# Patient Record
Sex: FEMALE | Race: WHITE | NOT HISPANIC OR LATINO | Employment: FULL TIME | URBAN - METROPOLITAN AREA
[De-identification: names, ages, dates, MRNs, and addresses within clinical notes are randomized per-mention and may not be internally consistent; named-entity substitution may affect disease eponyms.]

---

## 2017-07-17 DIAGNOSIS — K50.80 CROHN'S DISEASE OF BOTH SMALL AND LARGE INTESTINE WITHOUT COMPLICATION (HCC): ICD-10-CM

## 2017-07-17 DIAGNOSIS — R10.30 LOWER ABDOMINAL PAIN: ICD-10-CM

## 2017-07-17 RX ORDER — MESALAMINE 1.2 G/1
TABLET, DELAYED RELEASE ORAL
Qty: 60 TABLET | Refills: 11 | OUTPATIENT
Start: 2017-07-17

## 2017-09-14 ENCOUNTER — APPOINTMENT (OUTPATIENT)
Dept: LAB | Facility: HOSPITAL | Age: 37
End: 2017-09-14

## 2017-09-14 ENCOUNTER — OFFICE VISIT (OUTPATIENT)
Dept: GASTROENTEROLOGY | Facility: CLINIC | Age: 37
End: 2017-09-14

## 2017-09-14 VITALS
DIASTOLIC BLOOD PRESSURE: 80 MMHG | SYSTOLIC BLOOD PRESSURE: 124 MMHG | WEIGHT: 177 LBS | HEIGHT: 68 IN | RESPIRATION RATE: 12 BRPM | OXYGEN SATURATION: 98 % | BODY MASS INDEX: 26.83 KG/M2 | TEMPERATURE: 97.7 F | HEART RATE: 65 BPM

## 2017-09-14 DIAGNOSIS — D84.9 IMMUNOCOMPROMISED (HCC): ICD-10-CM

## 2017-09-14 DIAGNOSIS — R10.30 LOWER ABDOMINAL PAIN: ICD-10-CM

## 2017-09-14 DIAGNOSIS — K50.80 CROHN'S DISEASE OF BOTH SMALL AND LARGE INTESTINE WITHOUT COMPLICATION (HCC): Primary | ICD-10-CM

## 2017-09-14 DIAGNOSIS — Z79.899 MEDICATION MANAGEMENT: ICD-10-CM

## 2017-09-14 LAB
ALBUMIN SERPL-MCNC: 3.7 G/DL (ref 3.2–4.8)
ALBUMIN/GLOB SERPL: 1 G/DL (ref 1.5–2.5)
ALP SERPL-CCNC: 87 U/L (ref 25–100)
ALT SERPL W P-5'-P-CCNC: 11 U/L (ref 7–40)
ANION GAP SERPL CALCULATED.3IONS-SCNC: 10 MMOL/L (ref 3–11)
AST SERPL-CCNC: 14 U/L (ref 0–33)
BASOPHILS # BLD AUTO: 0.04 10*3/MM3 (ref 0–0.2)
BASOPHILS NFR BLD AUTO: 0.7 % (ref 0–1)
BILIRUB SERPL-MCNC: 0.2 MG/DL (ref 0.3–1.2)
BUN BLD-MCNC: 10 MG/DL (ref 9–23)
BUN/CREAT SERPL: 12.5 (ref 7–25)
CALCIUM SPEC-SCNC: 8.5 MG/DL (ref 8.7–10.4)
CHLORIDE SERPL-SCNC: 107 MMOL/L (ref 99–109)
CO2 SERPL-SCNC: 22 MMOL/L (ref 20–31)
CREAT BLD-MCNC: 0.8 MG/DL (ref 0.6–1.3)
CRP SERPL-MCNC: 8.11 MG/DL (ref 0–1)
DEPRECATED RDW RBC AUTO: 44.8 FL (ref 37–54)
EOSINOPHIL # BLD AUTO: 0.4 10*3/MM3 (ref 0–0.3)
EOSINOPHIL NFR BLD AUTO: 7 % (ref 0–3)
ERYTHROCYTE [DISTWIDTH] IN BLOOD BY AUTOMATED COUNT: 17.9 % (ref 11.3–14.5)
GFR SERPL CREATININE-BSD FRML MDRD: 80 ML/MIN/1.73
GLOBULIN UR ELPH-MCNC: 3.6 GM/DL
GLUCOSE BLD-MCNC: 93 MG/DL (ref 70–100)
HCT VFR BLD AUTO: 32.9 % (ref 34.5–44)
HGB BLD-MCNC: 9.2 G/DL (ref 11.5–15.5)
IMM GRANULOCYTES # BLD: 0.01 10*3/MM3 (ref 0–0.03)
IMM GRANULOCYTES NFR BLD: 0.2 % (ref 0–0.6)
LYMPHOCYTES # BLD AUTO: 1.13 10*3/MM3 (ref 0.6–4.8)
LYMPHOCYTES NFR BLD AUTO: 19.8 % (ref 24–44)
MCH RBC QN AUTO: 19.2 PG (ref 27–31)
MCHC RBC AUTO-ENTMCNC: 28 G/DL (ref 32–36)
MCV RBC AUTO: 68.8 FL (ref 80–99)
MONOCYTES # BLD AUTO: 0.91 10*3/MM3 (ref 0–1)
MONOCYTES NFR BLD AUTO: 15.9 % (ref 0–12)
NEUTROPHILS # BLD AUTO: 3.22 10*3/MM3 (ref 1.5–8.3)
NEUTROPHILS NFR BLD AUTO: 56.4 % (ref 41–71)
PLATELET # BLD AUTO: 691 10*3/MM3 (ref 150–450)
PMV BLD AUTO: 8.2 FL (ref 6–12)
POTASSIUM BLD-SCNC: 5.1 MMOL/L (ref 3.5–5.5)
PROT SERPL-MCNC: 7.3 G/DL (ref 5.7–8.2)
RBC # BLD AUTO: 4.78 10*6/MM3 (ref 3.89–5.14)
SODIUM BLD-SCNC: 139 MMOL/L (ref 132–146)
WBC NRBC COR # BLD: 5.71 10*3/MM3 (ref 3.5–10.8)

## 2017-09-14 PROCEDURE — 80053 COMPREHEN METABOLIC PANEL: CPT | Performed by: INTERNAL MEDICINE

## 2017-09-14 PROCEDURE — 86140 C-REACTIVE PROTEIN: CPT | Performed by: INTERNAL MEDICINE

## 2017-09-14 PROCEDURE — 85025 COMPLETE CBC W/AUTO DIFF WBC: CPT | Performed by: INTERNAL MEDICINE

## 2017-09-14 PROCEDURE — 99214 OFFICE O/P EST MOD 30 MIN: CPT | Performed by: INTERNAL MEDICINE

## 2017-09-14 PROCEDURE — 36415 COLL VENOUS BLD VENIPUNCTURE: CPT | Performed by: INTERNAL MEDICINE

## 2017-09-14 RX ORDER — AZATHIOPRINE 50 MG/1
100 TABLET ORAL DAILY
Qty: 60 TABLET | Refills: 11 | Status: SHIPPED | OUTPATIENT
Start: 2017-09-14 | End: 2018-02-21 | Stop reason: SDUPTHER

## 2017-09-14 RX ORDER — MESALAMINE 1.2 G/1
2400 TABLET, DELAYED RELEASE ORAL DAILY
Qty: 60 TABLET | Refills: 11 | Status: SHIPPED | OUTPATIENT
Start: 2017-09-14 | End: 2017-12-20 | Stop reason: SDUPTHER

## 2017-09-14 NOTE — PROGRESS NOTES
Patient here for evaluation of Crohn's Disease. Diagnosis was made by colonoscopy, biopsies in 2001, small bowel series and CT scan. Onset was 15 years ago. Disease has involved cecum, ascending colon, transverse colon and terminal ileum. The patient has had anal setons for treatment of their IBD. The patient is currently having 1 bowel movements per day which are semisolid.  The patient currently denies significant abdominal pain or discomfort.  The patient does not have fever.  The patient does not have weight loss.  The patient does not have extraintestinal symptoms .  Last colonoscopy was Jan/2015 when having a flare.  She had been on just Lialda and Imuran.  She was finally after a lenthy delay able to get approval from Medicaid for Humira and has felt fine since. But has noted continued symptoms of postprandial cramping and bloating.She has had no nausea or vomiting.

## 2017-10-04 ENCOUNTER — TELEPHONE (OUTPATIENT)
Dept: GASTROENTEROLOGY | Facility: CLINIC | Age: 37
End: 2017-10-04

## 2017-12-19 ENCOUNTER — TELEPHONE (OUTPATIENT)
Dept: GASTROENTEROLOGY | Facility: CLINIC | Age: 37
End: 2017-12-19

## 2017-12-19 NOTE — TELEPHONE ENCOUNTER
Patient called needs refills to Humeria with [facility] and Claudine with [facility]. Mary Grace Chow rx was printed and she never turned into the pharmacy.

## 2017-12-20 DIAGNOSIS — K50.819 CROHN'S DISEASE OF SMALL AND LARGE INTESTINES WITH COMPLICATION (HCC): Primary | ICD-10-CM

## 2017-12-20 DIAGNOSIS — Z79.899 MEDICATION MANAGEMENT: ICD-10-CM

## 2017-12-20 DIAGNOSIS — K50.80 CROHN'S DISEASE OF BOTH SMALL AND LARGE INTESTINE WITHOUT COMPLICATION (HCC): Primary | ICD-10-CM

## 2017-12-20 DIAGNOSIS — K50.80 CROHN'S DISEASE OF BOTH SMALL AND LARGE INTESTINE WITHOUT COMPLICATION (HCC): ICD-10-CM

## 2017-12-20 RX ORDER — MESALAMINE 1.2 G/1
2400 TABLET, DELAYED RELEASE ORAL DAILY
Qty: 60 TABLET | Refills: 11 | Status: SHIPPED | OUTPATIENT
Start: 2017-12-20 | End: 2017-12-20

## 2017-12-20 RX ORDER — MESALAMINE 1.2 G/1
1200 TABLET, DELAYED RELEASE ORAL
Qty: 60 TABLET | Refills: 11 | Status: ON HOLD | OUTPATIENT
Start: 2017-12-20 | End: 2018-01-19

## 2017-12-20 NOTE — TELEPHONE ENCOUNTER
I renewed her Humira which listed only [facility] as her pharmacy in [place]. I am confused as to status of Claudine and did not reorder it.

## 2018-01-08 ENCOUNTER — TRANSCRIBE ORDERS (OUTPATIENT)
Dept: ADMINISTRATIVE | Facility: HOSPITAL | Age: 38
End: 2018-01-08

## 2018-01-08 DIAGNOSIS — R10.12 ABDOMINAL PAIN, LEFT UPPER QUADRANT: Primary | ICD-10-CM

## 2018-01-08 DIAGNOSIS — R10.12 LUQ PAIN: ICD-10-CM

## 2018-01-08 DIAGNOSIS — K50.819 CROHN'S DISEASE OF BOTH SMALL AND LARGE INTESTINE WITH COMPLICATION (HCC): ICD-10-CM

## 2018-01-08 DIAGNOSIS — K50.819 CROHN'S DISEASE OF BOTH SMALL AND LG INT W UNSP COMP (HCC): Primary | ICD-10-CM

## 2018-01-09 ENCOUNTER — HOSPITAL ENCOUNTER (OUTPATIENT)
Dept: CT IMAGING | Facility: HOSPITAL | Age: 38
Discharge: HOME | End: 2018-01-09
Attending: FAMILY MEDICINE | Admitting: FAMILY MEDICINE

## 2018-01-09 DIAGNOSIS — K50.819 CROHN'S DISEASE OF BOTH SMALL AND LG INT W UNSP COMP (HCC): ICD-10-CM

## 2018-01-09 DIAGNOSIS — R10.12 LUQ PAIN: ICD-10-CM

## 2018-01-09 PROCEDURE — 74177 CT ABD & PELVIS W/CONTRAST: CPT

## 2018-01-09 PROCEDURE — 0 IOPAMIDOL 61 % SOLUTION: Performed by: FAMILY MEDICINE

## 2018-01-09 RX ADMIN — BARIUM SULFATE 450 ML: 21 SUSPENSION ORAL at 13:30

## 2018-01-09 RX ADMIN — IOPAMIDOL 99 ML: 612 INJECTION, SOLUTION INTRAVENOUS at 14:40

## 2018-01-17 ENCOUNTER — HOSPITAL ENCOUNTER (INPATIENT)
Facility: HOSPITAL | Age: 38
LOS: 3 days | Discharge: HOME | End: 2018-01-20
Attending: EMERGENCY MEDICINE | Admitting: INTERNAL MEDICINE

## 2018-01-17 ENCOUNTER — OFFICE VISIT (OUTPATIENT)
Dept: GASTROENTEROLOGY | Facility: CLINIC | Age: 38
End: 2018-01-17

## 2018-01-17 VITALS
RESPIRATION RATE: 16 BRPM | BODY MASS INDEX: 25.61 KG/M2 | HEIGHT: 68 IN | SYSTOLIC BLOOD PRESSURE: 122 MMHG | OXYGEN SATURATION: 99 % | TEMPERATURE: 98.8 F | HEART RATE: 120 BPM | DIASTOLIC BLOOD PRESSURE: 80 MMHG | WEIGHT: 169 LBS

## 2018-01-17 DIAGNOSIS — K50.118 CROHN'S DISEASE OF LARGE INTESTINE WITH OTHER COMPLICATION (HCC): Primary | ICD-10-CM

## 2018-01-17 DIAGNOSIS — K50.80 CROHN'S DISEASE OF BOTH SMALL AND LARGE INTESTINE WITHOUT COMPLICATION (HCC): Primary | ICD-10-CM

## 2018-01-17 DIAGNOSIS — D84.9 IMMUNOCOMPROMISED (HCC): ICD-10-CM

## 2018-01-17 DIAGNOSIS — R10.84 GENERALIZED ABDOMINAL PAIN: ICD-10-CM

## 2018-01-17 DIAGNOSIS — Z79.899 MEDICATION MANAGEMENT: ICD-10-CM

## 2018-01-17 PROBLEM — D64.9 ANEMIA: Status: ACTIVE | Noted: 2018-01-17

## 2018-01-17 PROBLEM — N30.01 ACUTE CYSTITIS WITH HEMATURIA: Status: ACTIVE | Noted: 2018-01-17

## 2018-01-17 LAB
ALBUMIN SERPL-MCNC: 3.7 G/DL (ref 3.2–4.8)
ALBUMIN/GLOB SERPL: 0.9 G/DL (ref 1.5–2.5)
ALP SERPL-CCNC: 85 U/L (ref 25–100)
ALT SERPL W P-5'-P-CCNC: 6 U/L (ref 7–40)
ANION GAP SERPL CALCULATED.3IONS-SCNC: 12 MMOL/L (ref 3–11)
AST SERPL-CCNC: 9 U/L (ref 0–33)
B-HCG UR QL: NEGATIVE
BACTERIA UR QL AUTO: ABNORMAL /HPF
BASOPHILS # BLD AUTO: 0.03 10*3/MM3 (ref 0–0.2)
BASOPHILS NFR BLD AUTO: 0.3 % (ref 0–1)
BILIRUB SERPL-MCNC: 0.3 MG/DL (ref 0.3–1.2)
BILIRUB UR QL STRIP: ABNORMAL
BUN BLD-MCNC: 11 MG/DL (ref 9–23)
BUN/CREAT SERPL: 12.2 (ref 7–25)
CALCIUM SPEC-SCNC: 9.1 MG/DL (ref 8.7–10.4)
CHLORIDE SERPL-SCNC: 101 MMOL/L (ref 99–109)
CLARITY UR: ABNORMAL
CO2 SERPL-SCNC: 20 MMOL/L (ref 20–31)
COLOR UR: ABNORMAL
CREAT BLD-MCNC: 0.9 MG/DL (ref 0.6–1.3)
DEPRECATED RDW RBC AUTO: 42.3 FL (ref 37–54)
EOSINOPHIL # BLD AUTO: 0.19 10*3/MM3 (ref 0–0.3)
EOSINOPHIL NFR BLD AUTO: 2 % (ref 0–3)
ERYTHROCYTE [DISTWIDTH] IN BLOOD BY AUTOMATED COUNT: 17.4 % (ref 11.3–14.5)
FINE GRAN CASTS URNS QL MICRO: ABNORMAL /LPF
GFR SERPL CREATININE-BSD FRML MDRD: 70 ML/MIN/1.73
GLOBULIN UR ELPH-MCNC: 4.2 GM/DL
GLUCOSE BLD-MCNC: 92 MG/DL (ref 70–100)
GLUCOSE UR STRIP-MCNC: NEGATIVE MG/DL
HCT VFR BLD AUTO: 26.6 % (ref 34.5–44)
HGB BLD-MCNC: 7.5 G/DL (ref 11.5–15.5)
HGB UR QL STRIP.AUTO: ABNORMAL
HOLD SPECIMEN: NORMAL
HOLD SPECIMEN: NORMAL
HYALINE CASTS UR QL AUTO: ABNORMAL /LPF
IMM GRANULOCYTES # BLD: 0.03 10*3/MM3 (ref 0–0.03)
IMM GRANULOCYTES NFR BLD: 0.3 % (ref 0–0.6)
INTERNAL NEGATIVE CONTROL: NEGATIVE
INTERNAL POSITIVE CONTROL: POSITIVE
IRON 24H UR-MRATE: 4 MCG/DL (ref 50–175)
IRON SATN MFR SERPL: 1 % (ref 15–50)
KETONES UR QL STRIP: ABNORMAL
LEUKOCYTE ESTERASE UR QL STRIP.AUTO: ABNORMAL
LIPASE SERPL-CCNC: 26 U/L (ref 6–51)
LYMPHOCYTES # BLD AUTO: 1.27 10*3/MM3 (ref 0.6–4.8)
LYMPHOCYTES NFR BLD AUTO: 13.2 % (ref 24–44)
Lab: NORMAL
MCH RBC QN AUTO: 18.6 PG (ref 27–31)
MCHC RBC AUTO-ENTMCNC: 28.2 G/DL (ref 32–36)
MCV RBC AUTO: 66 FL (ref 80–99)
MONOCYTES # BLD AUTO: 1 10*3/MM3 (ref 0–1)
MONOCYTES NFR BLD AUTO: 10.4 % (ref 0–12)
MUCOUS THREADS URNS QL MICRO: ABNORMAL /HPF
NEUTROPHILS # BLD AUTO: 7.11 10*3/MM3 (ref 1.5–8.3)
NEUTROPHILS NFR BLD AUTO: 73.8 % (ref 41–71)
NITRITE UR QL STRIP: POSITIVE
PH UR STRIP.AUTO: 5.5 [PH] (ref 5–8)
PLATELET # BLD AUTO: 808 10*3/MM3 (ref 150–450)
PMV BLD AUTO: 8 FL (ref 6–12)
POTASSIUM BLD-SCNC: 3.7 MMOL/L (ref 3.5–5.5)
PROT SERPL-MCNC: 7.9 G/DL (ref 5.7–8.2)
PROT UR QL STRIP: ABNORMAL
RBC # BLD AUTO: 4.03 10*6/MM3 (ref 3.89–5.14)
RBC # UR: ABNORMAL /HPF
REF LAB TEST METHOD: ABNORMAL
RETICS/RBC NFR AUTO: 0.91 % (ref 0.5–1.5)
SODIUM BLD-SCNC: 133 MMOL/L (ref 132–146)
SP GR UR STRIP: 1.03 (ref 1–1.03)
SQUAMOUS #/AREA URNS HPF: ABNORMAL /HPF
TIBC SERPL-MCNC: 343 MCG/DL (ref 250–450)
UROBILINOGEN UR QL STRIP: ABNORMAL
WBC NRBC COR # BLD: 9.63 10*3/MM3 (ref 3.5–10.8)
WBC UR QL AUTO: ABNORMAL /HPF
WHOLE BLOOD HOLD SPECIMEN: NORMAL
WHOLE BLOOD HOLD SPECIMEN: NORMAL

## 2018-01-17 PROCEDURE — 83540 ASSAY OF IRON: CPT | Performed by: HOSPITALIST

## 2018-01-17 PROCEDURE — 25810000003 SODIUM CHLORIDE 0.9 % WITH KCL 20 MEQ 20-0.9 MEQ/L-% SOLUTION: Performed by: NURSE PRACTITIONER

## 2018-01-17 PROCEDURE — 99214 OFFICE O/P EST MOD 30 MIN: CPT | Performed by: INTERNAL MEDICINE

## 2018-01-17 PROCEDURE — 82728 ASSAY OF FERRITIN: CPT | Performed by: HOSPITALIST

## 2018-01-17 PROCEDURE — 81001 URINALYSIS AUTO W/SCOPE: CPT | Performed by: EMERGENCY MEDICINE

## 2018-01-17 PROCEDURE — 83690 ASSAY OF LIPASE: CPT | Performed by: EMERGENCY MEDICINE

## 2018-01-17 PROCEDURE — 86140 C-REACTIVE PROTEIN: CPT | Performed by: HOSPITALIST

## 2018-01-17 PROCEDURE — 85025 COMPLETE CBC W/AUTO DIFF WBC: CPT | Performed by: EMERGENCY MEDICINE

## 2018-01-17 PROCEDURE — 85045 AUTOMATED RETICULOCYTE COUNT: CPT | Performed by: HOSPITALIST

## 2018-01-17 PROCEDURE — 25010000002 METHYLPREDNISOLONE PER 125 MG: Performed by: NURSE PRACTITIONER

## 2018-01-17 PROCEDURE — 87086 URINE CULTURE/COLONY COUNT: CPT | Performed by: EMERGENCY MEDICINE

## 2018-01-17 PROCEDURE — 99284 EMERGENCY DEPT VISIT MOD MDM: CPT

## 2018-01-17 PROCEDURE — 83550 IRON BINDING TEST: CPT | Performed by: HOSPITALIST

## 2018-01-17 PROCEDURE — 80053 COMPREHEN METABOLIC PANEL: CPT | Performed by: EMERGENCY MEDICINE

## 2018-01-17 PROCEDURE — 99223 1ST HOSP IP/OBS HIGH 75: CPT | Performed by: HOSPITALIST

## 2018-01-17 RX ORDER — ACETAMINOPHEN 325 MG/1
650 TABLET ORAL EVERY 4 HOURS PRN
Status: DISCONTINUED | OUTPATIENT
Start: 2018-01-17 | End: 2018-01-20 | Stop reason: HOSPADM

## 2018-01-17 RX ORDER — METHYLPREDNISOLONE SODIUM SUCCINATE 125 MG/2ML
80 INJECTION, POWDER, LYOPHILIZED, FOR SOLUTION INTRAMUSCULAR; INTRAVENOUS EVERY 6 HOURS SCHEDULED
Status: DISCONTINUED | OUTPATIENT
Start: 2018-01-17 | End: 2018-01-18

## 2018-01-17 RX ORDER — LEVOCETIRIZINE DIHYDROCHLORIDE 5 MG/1
5 TABLET, FILM COATED ORAL AS NEEDED
COMMUNITY

## 2018-01-17 RX ORDER — CETIRIZINE HYDROCHLORIDE 10 MG/1
10 TABLET ORAL DAILY
Status: DISCONTINUED | OUTPATIENT
Start: 2018-01-18 | End: 2018-01-20 | Stop reason: HOSPADM

## 2018-01-17 RX ORDER — MESALAMINE 1.2 G/1
1200 TABLET, DELAYED RELEASE ORAL
Status: DISCONTINUED | OUTPATIENT
Start: 2018-01-18 | End: 2018-01-19

## 2018-01-17 RX ORDER — SODIUM CHLORIDE 0.9 % (FLUSH) 0.9 %
1-10 SYRINGE (ML) INJECTION AS NEEDED
Status: DISCONTINUED | OUTPATIENT
Start: 2018-01-17 | End: 2018-01-20 | Stop reason: HOSPADM

## 2018-01-17 RX ORDER — DESOGESTREL AND ETHINYL ESTRADIOL 0.15-0.03
1 KIT ORAL DAILY
COMMUNITY
End: 2018-01-17

## 2018-01-17 RX ORDER — NALOXONE HCL 0.4 MG/ML
0.4 VIAL (ML) INJECTION
Status: DISCONTINUED | OUTPATIENT
Start: 2018-01-17 | End: 2018-01-20 | Stop reason: HOSPADM

## 2018-01-17 RX ORDER — FAMOTIDINE 20 MG/1
40 TABLET, FILM COATED ORAL DAILY
Status: DISCONTINUED | OUTPATIENT
Start: 2018-01-17 | End: 2018-01-20 | Stop reason: HOSPADM

## 2018-01-17 RX ORDER — MORPHINE SULFATE 2 MG/ML
1 INJECTION, SOLUTION INTRAMUSCULAR; INTRAVENOUS EVERY 4 HOURS PRN
Status: DISCONTINUED | OUTPATIENT
Start: 2018-01-17 | End: 2018-01-20 | Stop reason: HOSPADM

## 2018-01-17 RX ORDER — SODIUM CHLORIDE 0.9 % (FLUSH) 0.9 %
10 SYRINGE (ML) INJECTION AS NEEDED
Status: DISCONTINUED | OUTPATIENT
Start: 2018-01-17 | End: 2018-01-20 | Stop reason: HOSPADM

## 2018-01-17 RX ORDER — AZATHIOPRINE 50 MG/1
100 TABLET ORAL DAILY
Status: DISCONTINUED | OUTPATIENT
Start: 2018-01-18 | End: 2018-01-20 | Stop reason: HOSPADM

## 2018-01-17 RX ORDER — ONDANSETRON 2 MG/ML
4 INJECTION INTRAMUSCULAR; INTRAVENOUS EVERY 6 HOURS PRN
Status: DISCONTINUED | OUTPATIENT
Start: 2018-01-17 | End: 2018-01-20 | Stop reason: HOSPADM

## 2018-01-17 RX ORDER — OXYCODONE HYDROCHLORIDE AND ACETAMINOPHEN 5; 325 MG/1; MG/1
1 TABLET ORAL EVERY 4 HOURS PRN
Status: DISCONTINUED | OUTPATIENT
Start: 2018-01-17 | End: 2018-01-20 | Stop reason: HOSPADM

## 2018-01-17 RX ORDER — SODIUM CHLORIDE AND POTASSIUM CHLORIDE 150; 900 MG/100ML; MG/100ML
100 INJECTION, SOLUTION INTRAVENOUS CONTINUOUS
Status: DISCONTINUED | OUTPATIENT
Start: 2018-01-17 | End: 2018-01-20

## 2018-01-17 RX ORDER — ONDANSETRON 4 MG/1
4 TABLET, FILM COATED ORAL EVERY 6 HOURS PRN
Status: DISCONTINUED | OUTPATIENT
Start: 2018-01-17 | End: 2018-01-20 | Stop reason: HOSPADM

## 2018-01-17 RX ADMIN — METHYLPREDNISOLONE SODIUM SUCCINATE 80 MG: 125 INJECTION, POWDER, FOR SOLUTION INTRAMUSCULAR; INTRAVENOUS at 22:18

## 2018-01-17 RX ADMIN — OXYCODONE AND ACETAMINOPHEN 1 TABLET: 5; 325 TABLET ORAL at 20:45

## 2018-01-17 RX ADMIN — ONDANSETRON 4 MG: 4 TABLET, FILM COATED ORAL at 20:50

## 2018-01-17 RX ADMIN — POTASSIUM CHLORIDE AND SODIUM CHLORIDE 100 ML/HR: 900; 150 INJECTION, SOLUTION INTRAVENOUS at 20:45

## 2018-01-17 RX ADMIN — SODIUM CHLORIDE 1000 ML: 9 INJECTION, SOLUTION INTRAVENOUS at 17:55

## 2018-01-17 NOTE — PROGRESS NOTES
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 She has had 2-3 months of diarrhea (1-2 liquid stools per day without blood and no nocturnal stooling).  She began having LUQ pain about 3 weeks ago, had near normalization of bowel habits, had CT of abdomen with marked inflammatory changes of transverse and descending colon without abscess or fistula.  Is referred now to see about medical treatment VS surgical colectomy she was offered (but refused) by Dr. Skaggs.  She has not had follow-up labs as I requested after September 2017 visit.  She has submitted stools for C diff and culture today.  Was given Xifaxin which she just started today. No nausea or vomiting.  Has lost weight (10 lbs) in 2-3 months. No fever or chills.        Past Medical History:   Diagnosis Date   • Crohn disease          Family History   Problem Relation Age of Onset   • No Known Problems Mother    • No Known Problems Father    • GI problems Brother    • No Known Problems Maternal Grandmother    • No Known Problems Maternal Grandfather    • No Known Problems Paternal Grandmother    • No Known Problems Paternal Grandfather           reports that she has never smoked. She has never used smokeless tobacco. She reports that she does not drink alcohol or use illicit drugs.        Current Outpatient Prescriptions:   •  Adalimumab (HUMIRA PEN) 40 MG/0.8ML Pen-injector Kit, Inject 40 mg under the skin Every 14 (Fourteen) Days for 26 doses., Disp: 1.6 mL, Rfl: 11  •  azaTHIOprine (IMURAN) 50 MG tablet, Take 2 tablets by mouth Daily., Disp: 60 tablet, Rfl: 11  •  mesalamine (LIALDA) 1.2 g EC tablet, Take 1 tablet by mouth Daily With Breakfast., Disp: 60 tablet, Rfl: 11  •   "rifAXIMin (XIFAXAN) 200 MG tablet, Take 200 mg by mouth 3 (Three) Times a Day., Disp: , Rfl:     Allergies:  Review of patient's allergies indicates no known allergies.    ROS:    Review of Systems   Skin: Positive for color change (Pail).   Gastrointestinal: Positive for bloating, abdominal pain and diarrhea.   All other systems reviewed and are negative.      Objective     Blood pressure 122/80, pulse 120, temperature 98.8 °F (37.1 °C), temperature source Temporal Artery , resp. rate 16, height 172.7 cm (68\"), weight 76.7 kg (169 lb), SpO2 99 %.    Physical Exam   Constitutional: Pt is oriented to person, place, and time and well-developed, well-nourished, and in no distress.   HENT:   Mouth/Throat: Oropharynx is clear and moist.   Neck: Normal range of motion. Neck supple.   Cardiovascular: Normal rate, regular rhythm and normal heart sounds.    Pulmonary/Chest: Effort normal and breath sounds normal. No respiratory distress. No  wheezes.   Abdominal: Soft. Bowel sounds are normal.  Soft, tenderness of upper and left lateral abdomen with slight rebound, normal bowel sounds; no bruits, organomegaly or masses.  Skin: Skin is warm and dry.   Psychiatric: Mood, memory, affect and judgment normal.     Assessment/Plan Overall it seems she has a severe flare of Crohn's of transverse and descending colon that has developed while on Humira, Imuran and Lialda.  Believe she is in need of new biologic treatment, temporary steroids, and hospitalization.  If no quick response, Dr Skaggs may need to do surgery.  I will discuss with one of my colleagues who still admits .    Diagnosis:  Sultana was seen today for crohn's disease and abdominal pain.    Diagnoses and all orders for this visit:    Crohn's disease of large intestine with other complication    Immunocompromised    Medication management      Anticipated Surgical Procedure:    The risks, benefits, and alternatives of this procedure have been discussed with the patient " or the responsible party- the patient understands and agrees to proceed.

## 2018-01-17 NOTE — H&P
[facility] Medicine Services  HISTORY AND PHYSICAL    Patient Name: Sultana Siegel  : 1980  MRN: [id number]  Primary Care Physician: Lori Mcmahon MD    Subjective   Subjective     Chief Complaint:  Abdominal Pain/ diarrhea    HPI:  Sultana Siegel is a 37 y.o. female with pmh significant for Crohn's disease, anemia that presented to primary GI physician's office today, Dr. Meier for increasing left sided abdominal pain with intermittent diarrhea x 3 weeks. Patient states abdominal pains only on the left side with intermittent increase in cramping/sharp abdominal pain throughout.  Occasional nausea without vomiting and intermittent diarrhea that seems to worsen with by mouth intake.  He shouldn't has not had significant decrease in intake nor changes in appetite.  However she has had approximate 10 pound weight loss since late December.  She denies fever, chills.  She denies lightheadedness, dizziness, syncope.  Denies shortness of air, chest pain, palpitations.  Patient has been and is currently taking as scheduled Lialda, Humira, Imuran.    Patient CT abdomen and pelvis reviewed from 2018 reveals severe Crohn's flare of the transverse and descending colon that has developed while on biologic treatment. Patient work up reveals + UTI with crohn's flare, hgb 7.5, hct 26.6. Patient to be admitted to hospital medicine service.    Review of Systems   Constitutional: Positive for activity change, fatigue and unexpected weight change. Negative for appetite change, chills and fever.   HENT: Negative.    Eyes: Negative.    Respiratory: Negative.  Negative for shortness of breath.    Cardiovascular: Negative.  Negative for chest pain.   Gastrointestinal: Positive for abdominal distention, abdominal pain, diarrhea and nausea. Negative for blood in stool and vomiting.   Genitourinary: Negative for difficulty urinating, dysuria and frequency.   Musculoskeletal: Negative.    Skin:  Negative.    Neurological: Positive for weakness.   Psychiatric/Behavioral: Negative.           Otherwise 10-system ROS reviewed and is negative except as mentioned in the HPI.    Personal History     Past Medical History:   Diagnosis Date   • Crohn disease        History reviewed. No pertinent surgical history.    Family History: family history includes GI problems in her brother; No Known Problems in her father, maternal grandfather, maternal grandmother, mother, paternal grandfather, and paternal grandmother.     Social History:  reports that she has never smoked. She has never used smokeless tobacco. She reports that she does not drink alcohol or use illicit drugs.  Social History     Social History Narrative       Medications:    (Not in a hospital admission)    No Known Allergies    Objective   Objective     Vital Signs:   Temp:  [98.8 °F (37.1 °C)-99.7 °F (37.6 °C)] 99.7 °F (37.6 °C)  Heart Rate:  [] 87  Resp:  [16-18] 18  BP: (117-122)/(65-80) 117/65        Physical Exam   Constitutional: No acute distress, awake, alert  HENT: NCAT, mucous membranes moist  Respiratory: Clear to auscultation bilaterally, respiratory effort normal   Cardiovascular: RRR, no murmurs, rubs, or gallops, palpable pedal pulses bilaterally  Gastrointestinal: Positive bowel sounds, soft, tender to light palpation Left upper/ lower quadrants, nondistended  Musculoskeletal: No bilateral ankle edema  Psychiatric: Appropriate affect, cooperative  Neurologic: Oriented x 3, strength symmetric in all extremities, Cranial Nerves grossly intact to confrontation, speech clear  Skin: No rashes      Results Reviewed:  I have personally reviewed current lab, radiology, and data and agree.      Results from last 7 days  Lab Units 01/17/18  1543   WBC 10*3/mm3 9.63   HEMOGLOBIN g/dL 7.5*   HEMATOCRIT % 26.6*   PLATELETS 10*3/mm3 808*       Results from last 7 days  Lab Units 01/17/18  1543   SODIUM mmol/L 133   POTASSIUM mmol/L 3.7   CHLORIDE  mmol/L 101   CO2 mmol/L 20.0   BUN mg/dL 11   CREATININE mg/dL 0.90   GLUCOSE mg/dL 92   CALCIUM mg/dL 9.1   ALT (SGPT) U/L 6*   AST (SGOT) U/L 9     Brief Urine Lab Results  (Last result in the past 365 days)      Color   Clarity   Blood   Leuk Est   Nitrite   Protein   CREAT   Urine HCG        01/17/18 1549               Negative         No results found for: BNP  No results found for: PHART  Imaging Results (last 24 hours)     ** No results found for the last 24 hours. **             Assessment/Plan   Assessment / Plan     Hospital Problem List     * (Principal)Crohn's disease of both small and large intestine without complication    Acute cystitis with hematuria    Abdominal pain    Immunocompromised    Anemia            Assessment & Plan:  -- repeat IVF bolus 1L followed by NS +20K @100/hr  -- start solumedrol 80mg q6hr and consult CRS. Dr. Meier felt new biologic agent indicated. Will continue current agents and defer to CRS for changes  -- regular/ low residue diet for now  -- No noted leukocytosis, will cover acute flare and UTI with Zosyn, pending urine culture  -- prn percocet, morphine, zofran  -- repeat cbc, bmp, in am with ferritin, iron profile, monitor h/h. Transfuse hgb <7    DVT prophylaxis:mechanical  CODE STATUS:  Full Code    Admission Status:  I believe this patient meets INPATIENT status due to the need for care which can only be reasonably provided in an hospital setting such as aggressive/expedited ancillary services and/or consultation services, the necessity for IV medications, close physician monitoring and/or the possible need for procedures.  In such, I feel patient’s risk for adverse outcomes and need for care warrant INPATIENT evaluation and predict the patient’s care encounter to likely last beyond 2 midnights.      Jenn Baca, APRN   01/17/18   6:37 PM      Brief Attending Admission Attestation     I have seen and examined the patient, performing an independent face-to-face  diagnostic evaluation with plan of care reviewed and developed with the advanced practice clinician (APC).      Brief Summary Statement/HPI:   Sultana Siegel is a 37 y.o. female with Crohn's since 1999, on Humira (last dose 1/14/18) Imuran and Lialda presented with 3 weeks of LUQ pain- dull constant ache with sharp pains with movement. No N/V. 1-2 non-bloody stools daily. CT Abd/Pel shows marked inflammatory changes in transverse and descending colon without fistula or descending colon. Denies dysuria. 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    Attending Physical Exam:  Markedly tender to palpation in LUQ, LLQ and suprapubic regions  CTAB, no wheezing    Brief Assessment/Plan :  See above for further detailed assessment and plan developed with APC which I have reviewed and/or edited.    - Crohn's flare: CT A/P shows extensive inflammation with no abscess or fistulas. IV Steroids. GI consult in AM. Continue home biologics for now. Zosyn for possible infectious cause of colitis. Obtain C. Diff toxin, urine histo antigen, fecal calprotectin, ESR, CRP  - Anemia: Transfuse for Hgb<7.0. Obtain iron studies  - Thrombocytosis: Likely reactive to anemia. Iron studies  - Asymptomatic pyuria: Recollect UA      Kerry Moreno MD  01/17/18  10:14 PM     \

## 2018-01-18 LAB
ANION GAP SERPL CALCULATED.3IONS-SCNC: 6 MMOL/L (ref 3–11)
BACTERIA UR QL AUTO: ABNORMAL /HPF
BILIRUB UR QL STRIP: ABNORMAL
BUN BLD-MCNC: 11 MG/DL (ref 9–23)
BUN/CREAT SERPL: 13.8 (ref 7–25)
C DIFF TOX GENS STL QL NAA+PROBE: NOT DETECTED
CALCIUM SPEC-SCNC: 8.9 MG/DL (ref 8.7–10.4)
CHLORIDE SERPL-SCNC: 108 MMOL/L (ref 99–109)
CLARITY UR: ABNORMAL
CO2 SERPL-SCNC: 22 MMOL/L (ref 20–31)
COLOR UR: ABNORMAL
CREAT BLD-MCNC: 0.8 MG/DL (ref 0.6–1.3)
CRP SERPL-MCNC: 19.86 MG/DL (ref 0–1)
DEPRECATED RDW RBC AUTO: 44 FL (ref 37–54)
ERYTHROCYTE [DISTWIDTH] IN BLOOD BY AUTOMATED COUNT: 17.6 % (ref 11.3–14.5)
ERYTHROCYTE [SEDIMENTATION RATE] IN BLOOD: 105 MM/HR (ref 0–20)
FERRITIN SERPL-MCNC: 34 NG/ML (ref 10–291)
FINE GRAN CASTS URNS QL MICRO: ABNORMAL /LPF
GFR SERPL CREATININE-BSD FRML MDRD: 80 ML/MIN/1.73
GLUCOSE BLD-MCNC: 166 MG/DL (ref 70–100)
GLUCOSE UR STRIP-MCNC: NEGATIVE MG/DL
HCT VFR BLD AUTO: 28.2 % (ref 34.5–44)
HGB BLD-MCNC: 7.8 G/DL (ref 11.5–15.5)
HGB UR QL STRIP.AUTO: ABNORMAL
HYALINE CASTS UR QL AUTO: ABNORMAL /LPF
KETONES UR QL STRIP: NEGATIVE
LEUKOCYTE ESTERASE UR QL STRIP.AUTO: NEGATIVE
MCH RBC QN AUTO: 18.8 PG (ref 27–31)
MCHC RBC AUTO-ENTMCNC: 27.7 G/DL (ref 32–36)
MCV RBC AUTO: 68.1 FL (ref 80–99)
MUCOUS THREADS URNS QL MICRO: ABNORMAL /HPF
NITRITE UR QL STRIP: NEGATIVE
PH UR STRIP.AUTO: 5.5 [PH] (ref 5–8)
PLATELET # BLD AUTO: 768 10*3/MM3 (ref 150–450)
PMV BLD AUTO: 8.1 FL (ref 6–12)
POTASSIUM BLD-SCNC: 4.5 MMOL/L (ref 3.5–5.5)
PROT UR QL STRIP: ABNORMAL
RBC # BLD AUTO: 4.14 10*6/MM3 (ref 3.89–5.14)
RBC # UR: ABNORMAL /HPF
REF LAB TEST METHOD: ABNORMAL
SODIUM BLD-SCNC: 136 MMOL/L (ref 132–146)
SP GR UR STRIP: 1.03 (ref 1–1.03)
SQUAMOUS #/AREA URNS HPF: ABNORMAL /HPF
UROBILINOGEN UR QL STRIP: ABNORMAL
WBC NRBC COR # BLD: 6.41 10*3/MM3 (ref 3.5–10.8)
WBC UR QL AUTO: ABNORMAL /HPF

## 2018-01-18 PROCEDURE — 80299 QUANTITATIVE ASSAY DRUG: CPT | Performed by: PHYSICIAN ASSISTANT

## 2018-01-18 PROCEDURE — 81001 URINALYSIS AUTO W/SCOPE: CPT | Performed by: HOSPITALIST

## 2018-01-18 PROCEDURE — 85027 COMPLETE CBC AUTOMATED: CPT | Performed by: NURSE PRACTITIONER

## 2018-01-18 PROCEDURE — 83993 ASSAY FOR CALPROTECTIN FECAL: CPT | Performed by: HOSPITALIST

## 2018-01-18 PROCEDURE — 80048 BASIC METABOLIC PNL TOTAL CA: CPT | Performed by: NURSE PRACTITIONER

## 2018-01-18 PROCEDURE — 25010000002 METHYLPREDNISOLONE PER 125 MG: Performed by: NURSE PRACTITIONER

## 2018-01-18 PROCEDURE — 99254 IP/OBS CNSLTJ NEW/EST MOD 60: CPT | Performed by: PHYSICIAN ASSISTANT

## 2018-01-18 PROCEDURE — G0480 DRUG TEST DEF 1-7 CLASSES: HCPCS | Performed by: PHYSICIAN ASSISTANT

## 2018-01-18 PROCEDURE — 99233 SBSQ HOSP IP/OBS HIGH 50: CPT | Performed by: INTERNAL MEDICINE

## 2018-01-18 PROCEDURE — 25010000002 PIPERACILLIN SOD-TAZOBACTAM PER 1 G: Performed by: NURSE PRACTITIONER

## 2018-01-18 PROCEDURE — 25010000002 NA FERRIC GLUC CPLX PER 12.5 MG: Performed by: INTERNAL MEDICINE

## 2018-01-18 PROCEDURE — 87493 C DIFF AMPLIFIED PROBE: CPT | Performed by: HOSPITALIST

## 2018-01-18 PROCEDURE — 63710000001 AZATHIOPRINE PER 50 MG: Performed by: NURSE PRACTITIONER

## 2018-01-18 PROCEDURE — 87385 HISTOPLASMA CAPSUL AG IA: CPT | Performed by: HOSPITALIST

## 2018-01-18 PROCEDURE — 25010000002 METHYLPREDNISOLONE PER 40 MG: Performed by: PHYSICIAN ASSISTANT

## 2018-01-18 RX ORDER — METHYLPREDNISOLONE SODIUM SUCCINATE 40 MG/ML
20 INJECTION, POWDER, LYOPHILIZED, FOR SOLUTION INTRAMUSCULAR; INTRAVENOUS EVERY 12 HOURS
Status: DISCONTINUED | OUTPATIENT
Start: 2018-01-18 | End: 2018-01-20 | Stop reason: HOSPADM

## 2018-01-18 RX ADMIN — MESALAMINE 1.2 G: 1.2 TABLET, DELAYED RELEASE ORAL at 10:42

## 2018-01-18 RX ADMIN — METHYLPREDNISOLONE SODIUM SUCCINATE 80 MG: 125 INJECTION, POWDER, FOR SOLUTION INTRAMUSCULAR; INTRAVENOUS at 04:18

## 2018-01-18 RX ADMIN — FAMOTIDINE 40 MG: 20 TABLET, FILM COATED ORAL at 10:40

## 2018-01-18 RX ADMIN — TAZOBACTAM SODIUM AND PIPERACILLIN SODIUM 3.38 G: 375; 3 INJECTION, SOLUTION INTRAVENOUS at 18:54

## 2018-01-18 RX ADMIN — TAZOBACTAM SODIUM AND PIPERACILLIN SODIUM 3.38 G: 375; 3 INJECTION, SOLUTION INTRAVENOUS at 01:00

## 2018-01-18 RX ADMIN — TAZOBACTAM SODIUM AND PIPERACILLIN SODIUM 3.38 G: 375; 3 INJECTION, SOLUTION INTRAVENOUS at 10:39

## 2018-01-18 RX ADMIN — CETIRIZINE HYDROCHLORIDE 10 MG: 10 TABLET, FILM COATED ORAL at 10:41

## 2018-01-18 RX ADMIN — METHYLPREDNISOLONE SODIUM SUCCINATE 80 MG: 125 INJECTION, POWDER, FOR SOLUTION INTRAMUSCULAR; INTRAVENOUS at 10:41

## 2018-01-18 RX ADMIN — SODIUM CHLORIDE 125 MG: 9 INJECTION, SOLUTION INTRAVENOUS at 14:08

## 2018-01-18 RX ADMIN — METHYLPREDNISOLONE SODIUM SUCCINATE 20 MG: 40 INJECTION, POWDER, FOR SOLUTION INTRAMUSCULAR; INTRAVENOUS at 20:32

## 2018-01-18 RX ADMIN — AZATHIOPRINE 100 MG: 50 TABLET ORAL at 10:44

## 2018-01-18 NOTE — CONSULTS
"Adult Nutrition  Assessment/PES    Patient Name:  Sultana Siegel  YOB: 1980  MRN: [id number]  Admit Date:  1/17/2018    Assessment Date:  1/18/2018    Comments:  Pt does not meet criteria for nutrition risk at this time. RD will continue to follow per protocol.           Reason for Assessment       01/18/18 1456    Reason for Assessment    Reason For Assessment/Visit identified at risk by screening criteria    Identified At Risk By Screening Criteria MST SCORE 2+    Time Spent (min) 20                Anthropometrics       01/18/18 1456    Anthropometrics    Height Method Stated    Height 172.7 cm (67.99\")    Weight Method Standing scale    Weight 78.1 kg (172 lb 3.2 oz)    Ideal Body Weight (IBW)    Ideal Body Weight (IBW), Female 64.53    % Ideal Body Weight 121.3    Usual Body Weight (UBW)    Usual Body Weight 79.4 kg (175 lb)   stated per pt.    % Usual Body Weight 98.4    Body Mass Index (BMI)    BMI (kg/m2) 26.24                      Problem/Interventions:                    Nutrition Intervention       01/18/18 1457    Nutrition Intervention    RD/Tech Action Advise alternate selection;Interview for preference;Menu provided;Menu adjusted;Follow Tx progress              Education/Evaluation       01/18/18 1458    Education    Education --    MNT discussed with pt.    Monitor/Evaluation    Monitor Per protocol        Electronically signed by:  Sharda Caldwell RD  01/18/18 3:00 PM  "

## 2018-01-18 NOTE — PROGRESS NOTES
[facility] Medicine Services  PROGRESS NOTE    Patient Name: Sultana Siegel  : 1980  MRN: [id number]    Date of Admission: 2018  Length of Stay: 1  Primary Care Physician: Lori Mcmahon MD    Subjective   Subjective     CC:  crampy abd pain    HPI:  Feels same.    Is not interested in surgery.   Menstruates monthly  Intolerant of po iron    Review of Systems   Gen- No fevers, chills  CV- No chest pain, palpitations  Resp- No cough, dyspnea    Otherwise ROS is negative except as mentioned in the HPI.    Objective   Objective     Vital Signs:   Temp:  [96 °F (35.6 °C)-99.7 °F (37.6 °C)] 97.3 °F (36.3 °C)  Heart Rate:  [] 66  Resp:  [16-18] 16  BP: ()/(51-80) 90/51        Physical Exam:  Gen:  WD/WN young woman in NAD  Neuro: alert and oriented, clear speech, follows commands, grossly nonfocal  HEENT:  NC/AT PERRL, OP benign  Neck:  Supple, no LAD  Heart RRR no murmur, rub, or gallop  Lungs CTA nonabored  Abd:  Soft, with lower abd tenderness. no rebound or guarding, pos BS  Extrem:  No c/c/e  Results Reviewed:  I have personally reviewed current lab, radiology, and data and agree.      Results from last 7 days  Lab Units 18  0542 18  1543   WBC 10*3/mm3 6.41 9.63   HEMOGLOBIN g/dL 7.8* 7.5*   HEMATOCRIT % 28.2* 26.6*   PLATELETS 10*3/mm3 768* 808*       Results from last 7 days  Lab Units 18  0542 18  1543   SODIUM mmol/L 136 133   POTASSIUM mmol/L 4.5 3.7   CHLORIDE mmol/L 108 101   CO2 mmol/L 22.0 20.0   BUN mg/dL 11 11   CREATININE mg/dL 0.80 0.90   GLUCOSE mg/dL 166* 92   CALCIUM mg/dL 8.9 9.1   ALT (SGPT) U/L  --  6*   AST (SGOT) U/L  --  9     No results found for: BNP  No results found for: PHART    Microbiology Results Abnormal     Procedure Component Value - Date/Time    Urine Culture - Urine, Urine, Clean Catch [[id number]]  (Normal) Collected:  18 7102    Lab Status:  Preliminary result Specimen:  Urine from Urine, Clean  Catch Updated:  01/18/18 0813     Urine Culture No growth          Imaging Results (last 24 hours)     ** No results found for the last 24 hours. **             I have reviewed the medications.    Assessment/Plan   Assessment / Plan     Hospital Problem List     * (Principal)Crohn's disease of both small and large intestine without complication    Abdominal pain    Immunocompromised    Acute cystitis with hematuria    Anemia             Brief Hospital Course to date:  Sultana Siegel is a 37 y.o. female with Crohn's, formerly responding well to Humira (1/14) Imuran and Lialda, now with 3 weeks of LUQ pain and CT Abd/Pel shows marked inflammatory changes in transverse and descending colon without fistula or descending colon.  Sent for admission by Dr. Meier.     Assessment & Plan:    Crohns flare despite maximal medical therapy  -- supportive care  -- solumedrol 80mg q6hr   --consult CRS and GI  --  Dr. Meier felt new biologic agent indicated but surgery must be considered. (she has declined in the past)  -  Possibly infectious colitis (doubt)  -- zosyn empirically    -- pending urine histo antigen, fecal calprotectin, ESR, CRP    Anemia Fe deficiency severe - intolerant of po  -- Transfuse for Hgb<7.0.   -- IV iron ordered    Thrombocytosis: Likely reactive to anemia. Iron studies  Asymptomatic pyuria:do not treat        DVT Prophylaxis:      CODE STATUS: Full Code    Disposition: I expect the patient to be discharged home in 4 days.    Roxanne Miller MD  01/18/18  11:30 AM

## 2018-01-18 NOTE — CONSULTS
[facility]    Referring Provider: Roxanne Miller MD   PCP: Lori Mcmahon MD    Reason for Consultation: Crohn's flare    Chief complaint: Diarrhea, abdominal pain    History of present illness:    Sultana Siegel is a 37 y.o. female who is admitted with a Crohn's flare.  She is followed by Dr. Meier outpatient and on Humira, Imuran and Lialda.  She has done well on this regimen for several years and reports symptoms of diarrhea for two months.  Stool is described as 1-3 liquid bowel movements daily without blood.  No nocturnal bowel movements.  She began having left sided abdominal pain three weeks ago.  Her last Humira dose was 1/14/18.  She denies fever, chills, nausea or vomiting.  States she has a good appetite but has lost 10 lbs in two months.    She has been started on IV steroids last night and notes improvement in her symptoms today.  Abdominal pain is improved and no bowel movement today.      Allergies:  Review of patient's allergies indicates no known allergies.    Scheduled Meds:    azaTHIOprine 100 mg Oral Daily   cetirizine 10 mg Oral Daily   famotidine 40 mg Oral Daily   ferric gluconate (FERRLECIT) IVPB 125 mg Intravenous Daily   mesalamine 1,200 mg Oral Daily With Breakfast   methylPREDNISolone sodium succinate 80 mg Intravenous Q6H   piperacillin-tazobactam 3.375 g Intravenous Once   piperacillin-tazobactam 3.375 g Intravenous Q8H   rifAXIMin 200 mg Oral TID   sodium chloride 1,000 mL Intravenous Once        Infusions:    sodium chloride 0.9 % with KCl 20 mEq 100 mL/hr Last Rate: 100 mL/hr (01/17/18 2045)       PRN Meds:  •  acetaminophen  •  Morphine **AND** naloxone  •  ondansetron **OR** ondansetron  •  oxyCODONE-acetaminophen  •  sodium chloride  •  sodium chloride    Home Meds:  Prescriptions Prior to Admission   Medication Sig Dispense Refill Last Dose   • adalimumab (HUMIRA) 40 MG/0.8ML Prefilled Syringe Kit injection Inject 40 mg under the skin Every 14 (Fourteen) Days.  SUNDAYS   1/14/2018   • azaTHIOprine (IMURAN) 50 MG tablet Take 2 tablets by mouth Daily. 60 tablet 11 1/17/2018 at Unknown time   • levocetirizine (XYZAL) 5 MG tablet Take 5 mg by mouth Every Evening.   1/17/2018 at Unknown time   • mesalamine (LIALDA) 1.2 g EC tablet Take 1 tablet by mouth Daily With Breakfast. (Patient taking differently: Take 1,200 mg by mouth Daily With Breakfast. 2 TABS EVERY MORNING) 60 tablet 11 1/17/2018 at Unknown time   • rifAXIMin (XIFAXAN) 200 MG tablet Take 200 mg by mouth 3 (Three) Times a Day.   1/17/2018 at Unknown time       ROS: Review of Systems   Constitutional: Positive for fatigue. Negative for chills and fever.   HENT: Negative for trouble swallowing and voice change.    Respiratory: Negative for shortness of breath.    Cardiovascular: Negative for chest pain and palpitations.   Gastrointestinal: Positive for abdominal pain and diarrhea. Negative for nausea and vomiting.   Endocrine: Negative.    Genitourinary: Negative.    Musculoskeletal: Positive for arthralgias.   Skin: Negative.    Neurological: Negative.    Hematological: Negative.    Psychiatric/Behavioral: Negative.        PAST MED HX:  Past Medical History:   Diagnosis Date   • Crohn disease        PAST SURG HX:  History reviewed. No pertinent surgical history.    FAM HX:  Family History   Problem Relation Age of Onset   • No Known Problems Mother    • No Known Problems Father    • GI problems Brother    • No Known Problems Maternal Grandmother    • No Known Problems Maternal Grandfather    • No Known Problems Paternal Grandmother    • No Known Problems Paternal Grandfather        SOC HX:  Social History     Social History   • Marital status: Single     Spouse name: N/A   • Number of children: N/A   • Years of education: N/A     Occupational History   • Not on file.     Social History Main Topics   • Smoking status: Never Smoker   • Smokeless tobacco: Never Used   • Alcohol use No   • Drug use: No   • Sexual  "activity: Not on file     Other Topics Concern   • Not on file     Social History Narrative       PHYSICAL EXAM  /59 (BP Location: Left arm, Patient Position: Lying)  Pulse 80  Temp 97.9 °F (36.6 °C) (Axillary)   Resp 18  Ht 172.7 cm (68\")  Wt 78.1 kg (172 lb 3.2 oz)  SpO2 98%  BMI 26.18 kg/m2  Wt Readings from Last 3 Encounters:   01/17/18 78.1 kg (172 lb 3.2 oz)   01/17/18 76.7 kg (169 lb)   09/14/17 80.3 kg (177 lb)   ,body mass index is 26.18 kg/(m^2).  Physical Exam   Constitutional: She is oriented to person, place, and time. She appears well-developed. No distress.   Neck: Normal range of motion.   Cardiovascular: Normal rate and regular rhythm.    Pulmonary/Chest: Effort normal and breath sounds normal. No respiratory distress. She has no wheezes.   Abdominal: Soft. Bowel sounds are normal. She exhibits no distension.   Mild tenderness to palpation of the left upper quadrant.  No guarding nor rebound.     Musculoskeletal: She exhibits no edema.   Neurological: She is alert and oriented to person, place, and time.   Skin: Skin is warm and dry. She is not diaphoretic.   Psychiatric: She has a normal mood and affect. Her behavior is normal.         Results Review:   I reviewed the patient's new clinical results.    Lab Results   Component Value Date    WBC 6.41 01/18/2018    HGB 7.8 (L) 01/18/2018    HGB 7.5 (L) 01/17/2018    HGB 9.2 (L) 09/14/2017    HCT 28.2 (L) 01/18/2018    MCV 68.1 (L) 01/18/2018     (H) 01/18/2018       No results found for: INR    Lab Results   Component Value Date    GLUCOSE 166 (H) 01/18/2018    BUN 11 01/18/2018    CREATININE 0.80 01/18/2018    EGFRIFNONA 80 01/18/2018    BCR 13.8 01/18/2018    CO2 22.0 01/18/2018    CALCIUM 8.9 01/18/2018    ALBUMIN 3.70 01/17/2018    AST 9 01/17/2018    ALT 6 (L) 01/17/2018     CT abdomen/pelvis 1/9/18 - marked inflammatory changes of the transverse and descending colon.  No abscess or fistula.   Iron 4. Ferritin 34.  Iron " saturation 1%.  TIBC 343.    CRP 19.86.  Sed rate 105.      ASSESSMENTS/PLANS    1. Crohn's flare  2. Chronic blood loss anemia  3. Iron deficiency     >>> C. Difficile, Stool culture and Fecal calprotectin pending.    >>> Will check Humira concentration levels as well as level of antibodies (Adalimumab concentration and Ab).  Last dose of Humira was 1/14/18.  Will also check  Imuran levels (thiopurine metabolites) .    >>> Decrease IV steroids to IV Solumedrol 20mg q12h   >>> IV Iron    I discussed the patients findings and my recommendations with patient    DINA Lane  01/18/18  1:49 PM

## 2018-01-19 LAB
BACTERIA SPEC AEROBE CULT: NORMAL
BACTERIA SPEC AEROBE CULT: NORMAL

## 2018-01-19 PROCEDURE — 25810000003 SODIUM CHLORIDE 0.9 % WITH KCL 20 MEQ 20-0.9 MEQ/L-% SOLUTION: Performed by: NURSE PRACTITIONER

## 2018-01-19 PROCEDURE — 25010000002 METHYLPREDNISOLONE PER 40 MG: Performed by: PHYSICIAN ASSISTANT

## 2018-01-19 PROCEDURE — 99232 SBSQ HOSP IP/OBS MODERATE 35: CPT | Performed by: INTERNAL MEDICINE

## 2018-01-19 PROCEDURE — 99232 SBSQ HOSP IP/OBS MODERATE 35: CPT | Performed by: PHYSICIAN ASSISTANT

## 2018-01-19 PROCEDURE — 63710000001 AZATHIOPRINE PER 50 MG: Performed by: NURSE PRACTITIONER

## 2018-01-19 PROCEDURE — 25010000002 NA FERRIC GLUC CPLX PER 12.5 MG: Performed by: INTERNAL MEDICINE

## 2018-01-19 PROCEDURE — 25010000002 PIPERACILLIN SOD-TAZOBACTAM PER 1 G: Performed by: NURSE PRACTITIONER

## 2018-01-19 RX ORDER — MESALAMINE 1.2 G/1
2400 TABLET, DELAYED RELEASE ORAL
COMMUNITY
End: 2018-02-21 | Stop reason: SDUPTHER

## 2018-01-19 RX ORDER — MESALAMINE 1.2 G/1
1.2 TABLET, DELAYED RELEASE ORAL ONCE
Status: COMPLETED | OUTPATIENT
Start: 2018-01-19 | End: 2018-01-19

## 2018-01-19 RX ORDER — MESALAMINE 1.2 G/1
2.4 TABLET, DELAYED RELEASE ORAL
Status: DISCONTINUED | OUTPATIENT
Start: 2018-01-20 | End: 2018-01-20 | Stop reason: HOSPADM

## 2018-01-19 RX ADMIN — METHYLPREDNISOLONE SODIUM SUCCINATE 20 MG: 40 INJECTION, POWDER, FOR SOLUTION INTRAMUSCULAR; INTRAVENOUS at 08:53

## 2018-01-19 RX ADMIN — AZATHIOPRINE 100 MG: 50 TABLET ORAL at 08:53

## 2018-01-19 RX ADMIN — SODIUM CHLORIDE 125 MG: 9 INJECTION, SOLUTION INTRAVENOUS at 08:55

## 2018-01-19 RX ADMIN — FAMOTIDINE 40 MG: 20 TABLET, FILM COATED ORAL at 08:53

## 2018-01-19 RX ADMIN — TAZOBACTAM SODIUM AND PIPERACILLIN SODIUM 3.38 G: 375; 3 INJECTION, SOLUTION INTRAVENOUS at 08:53

## 2018-01-19 RX ADMIN — CETIRIZINE HYDROCHLORIDE 10 MG: 10 TABLET, FILM COATED ORAL at 08:52

## 2018-01-19 RX ADMIN — TAZOBACTAM SODIUM AND PIPERACILLIN SODIUM 3.38 G: 375; 3 INJECTION, SOLUTION INTRAVENOUS at 18:25

## 2018-01-19 RX ADMIN — MESALAMINE 1.2 G: 1.2 TABLET, DELAYED RELEASE ORAL at 16:48

## 2018-01-19 RX ADMIN — RIFAXIMIN 200 MG: 200 TABLET ORAL at 16:48

## 2018-01-19 RX ADMIN — OXYCODONE AND ACETAMINOPHEN 1 TABLET: 5; 325 TABLET ORAL at 12:31

## 2018-01-19 RX ADMIN — METHYLPREDNISOLONE SODIUM SUCCINATE 20 MG: 40 INJECTION, POWDER, FOR SOLUTION INTRAMUSCULAR; INTRAVENOUS at 20:50

## 2018-01-19 RX ADMIN — OXYCODONE AND ACETAMINOPHEN 1 TABLET: 5; 325 TABLET ORAL at 20:56

## 2018-01-19 RX ADMIN — POTASSIUM CHLORIDE AND SODIUM CHLORIDE 100 ML/HR: 900; 150 INJECTION, SOLUTION INTRAVENOUS at 00:03

## 2018-01-19 RX ADMIN — RIFAXIMIN 200 MG: 200 TABLET ORAL at 08:52

## 2018-01-19 RX ADMIN — POTASSIUM CHLORIDE AND SODIUM CHLORIDE 100 ML/HR: 900; 150 INJECTION, SOLUTION INTRAVENOUS at 18:25

## 2018-01-19 RX ADMIN — TAZOBACTAM SODIUM AND PIPERACILLIN SODIUM 3.38 G: 375; 3 INJECTION, SOLUTION INTRAVENOUS at 00:03

## 2018-01-19 RX ADMIN — MESALAMINE 1.2 G: 1.2 TABLET, DELAYED RELEASE ORAL at 08:52

## 2018-01-19 NOTE — PLAN OF CARE
Problem: Patient Care Overview (Adult)  Goal: Plan of Care Review  Outcome: Ongoing (interventions implemented as appropriate)    Goal: Adult Individualization and Mutuality  Outcome: Ongoing (interventions implemented as appropriate)    Goal: Discharge Needs Assessment  Outcome: Ongoing (interventions implemented as appropriate)      Problem: Pain, Acute (Adult)  Goal: Acceptable Pain Control/Comfort Level  Outcome: Ongoing (interventions implemented as appropriate)

## 2018-01-19 NOTE — PLAN OF CARE
Problem: Patient Care Overview (Adult)  Goal: Plan of Care Review  Outcome: Ongoing (interventions implemented as appropriate)   01/18/18 0345 01/18/18 2032 01/19/18 0423   Coping/Psychosocial Response Interventions   Plan Of Care Reviewed With --  patient --    Patient Care Overview   Progress no change --  --    Outcome Evaluation   Outcome Summary/Follow up Plan --  --  VSS, pt denies any pain or discomfort. No further issues or concerns.       Problem: Pain, Acute (Adult)  Goal: Identify Related Risk Factors and Signs and Symptoms  Outcome: Outcome(s) achieved Date Met: 01/19/18    Goal: Acceptable Pain Control/Comfort Level  Outcome: Ongoing (interventions implemented as appropriate)

## 2018-01-19 NOTE — PROGRESS NOTES
"GI Daily Progress Note  Subjective:    Ms. Siegel states she hasn't felt as well today.  Having mild left lower quadrant cramping.  One loose bowel movement yesterday that was non bloody.  Afebrile.      Objective:    /68 (BP Location: Left arm, Patient Position: Lying)  Pulse 57  Temp 97.7 °F (36.5 °C) (Oral)   Resp 18  Ht 172.7 cm (67.99\")  Wt 78.1 kg (172 lb 3.2 oz)  SpO2 97%  BMI 26.19 kg/m2    Physical Exam   Constitutional: She is oriented to person, place, and time. She appears well-developed and well-nourished. No distress.   HENT:   Head: Normocephalic and atraumatic.   Eyes: No scleral icterus.   Neck: Normal range of motion.   Cardiovascular: Normal rate and regular rhythm.    Pulmonary/Chest: Effort normal and breath sounds normal.   Abdominal: Soft. Bowel sounds are normal.   Mild tenderness to palpation of the left lower quadrant.  No guarding or rebound.     Neurological: She is alert and oriented to person, place, and time.   Skin: Skin is warm and dry.   Psychiatric: She has a normal mood and affect. Her behavior is normal.       Lab  Lab Results   Component Value Date    WBC 6.41 01/18/2018    HGB 7.8 (L) 01/18/2018    HGB 7.5 (L) 01/17/2018    HGB 9.2 (L) 09/14/2017    MCV 68.1 (L) 01/18/2018     (H) 01/18/2018       Lab Results   Component Value Date    GLUCOSE 166 (H) 01/18/2018    BUN 11 01/18/2018    CREATININE 0.80 01/18/2018    EGFRIFNONA 80 01/18/2018    BCR 13.8 01/18/2018    CO2 22.0 01/18/2018    CALCIUM 8.9 01/18/2018    ALBUMIN 3.70 01/17/2018    AST 9 01/17/2018    ALT 6 (L) 01/17/2018     C. Dif, PCR negative     Assessment:    1. Crohn's flare  2. Chronic blood loss anemia  3. Iron deficiency     Plan:    >>> Humira level and antibodies as well as thiopurine metabolites are pending.  She may need a new class of drug, dose adjustment of Humira or a different anti-TNF agent.  This can be followed outpatient if her symptoms improve.    >>> Would continue IV Solumedrol "   >>> IV Iron     DINA Lane  01/19/18  11:46 AM

## 2018-01-19 NOTE — PROGRESS NOTES
[facility] Medicine Services  PROGRESS NOTE    Patient Name: Sultana Siegel  : 1980  MRN: [id number]    Date of Admission: 2018  Length of Stay: 2  Primary Care Physician: Lori Mcmahon MD    Subjective   Subjective     CC:  crampy abd pain    HPI:  Feels better after steroids  No nausea  Some abd discomfort  Eating.     Review of Systems   Gen- No fevers, chills  CV- No chest pain, palpitations  Resp- No cough, dyspnea    Otherwise ROS is negative except as mentioned in the HPI.    Objective   Objective     Vital Signs:   Temp:  [97.7 °F (36.5 °C)-97.9 °F (36.6 °C)] 97.7 °F (36.5 °C)  Heart Rate:  [57-80] 57  Resp:  [16-18] 18  BP: (100-104)/(59-69) 100/68        Physical Exam:  Gen:  WD/WN young woman in NAD, pleasant  Neuro: alert and oriented, clear speech, follows commands, grossly nonfocal  HEENT:  NC/AT PERRL, OP benign  Neck:  Supple, no LAD  Heart RRR no murmur, rub, or gallop  Lungs CTA nonabored  Abd:  Soft, with mild lower abd tenderness. no rebound or guarding, pos BS  Extrem:  No c/c/e  Results Reviewed:  I have personally reviewed current lab, radiology, and data and agree.      Results from last 7 days  Lab Units 18  0542 18  1543   WBC 10*3/mm3 6.41 9.63   HEMOGLOBIN g/dL 7.8* 7.5*   HEMATOCRIT % 28.2* 26.6*   PLATELETS 10*3/mm3 768* 808*       Results from last 7 days  Lab Units 18  0542 18  1543   SODIUM mmol/L 136 133   POTASSIUM mmol/L 4.5 3.7   CHLORIDE mmol/L 108 101   CO2 mmol/L 22.0 20.0   BUN mg/dL 11 11   CREATININE mg/dL 0.80 0.90   GLUCOSE mg/dL 166* 92   CALCIUM mg/dL 8.9 9.1   ALT (SGPT) U/L  --  6*   AST (SGOT) U/L  --  9     No results found for: BNP  No results found for: PHART    Microbiology Results Abnormal     Procedure Component Value - Date/Time    Urine Culture - Urine, Urine, Clean Catch [[id number]] Collected:  18 1541    Lab Status:  Final result Specimen:  Urine from Urine, Clean Catch Updated:   01/19/18 0920     Urine Culture --      50,000-60,000 CFU/mL Normal Urogenital Shante    Clostridium Difficile Toxin - Stool, Per Rectum [[id number]] Collected:  01/18/18 1716    Lab Status:  Final result Specimen:  Stool from Per Rectum Updated:  01/18/18 1821    Narrative:       The following orders were created for panel order Clostridium Difficile Toxin - Stool, Per Rectum.  Procedure                               Abnormality         Status                     ---------                               -----------         ------                     Clostridium Difficile To...[[id number]]  Normal              Final result                 Please view results for these tests on the individual orders.    Clostridium Difficile Toxin, PCR - Stool, Per Rectum [[id number]]  (Normal) Collected:  01/18/18 1716    Lab Status:  Final result Specimen:  Stool from Per Rectum Updated:  01/18/18 1821     C. Difficile Toxins by PCR Not Detected    Narrative:         Performance characteristics of test not established for patients <2 years of age.  Negative for Toxigenic C. Difficile          Imaging Results (last 24 hours)     ** No results found for the last 24 hours. **             I have reviewed the medications.    Assessment/Plan   Assessment / Plan     Hospital Problem List     * (Principal)Crohn's disease of both small and large intestine without complication    Abdominal pain    Immunocompromised    Acute cystitis with hematuria    Anemia             Brief Hospital Course to date:  Sultana Siegel is a 37 y.o. female with Crohn's, formerly responding well to Humira (1/14) Imuran and Lialda, now with 3 weeks of LUQ pain and CT Abd/Pel shows marked inflammatory changes in transverse and descending colon without fistula or descending colon.  Sent for admission by Dr. Meier.     Assessment & Plan:    Crohns flare despite maximal medical therapy  -- supportive care  -- solumedrol ongoing  --GI following, checking Humira levels and  Ab  --  Consider new agent  -    Anemia Fe deficiency severe - intolerant of po  -- Transfuse for Hgb<7.0.   -- IV iron ordered    Thrombocytosis: Likely reactive to anemia. Iron studies  Asymptomatic pyuria:do not treat        DVT Prophylaxis:      CODE STATUS: Full Code    Disposition: I expect the patient to be discharged home in 4 days.    Roxanne Miller MD  01/19/18  1:18 PM

## 2018-01-19 NOTE — PAYOR COMM NOTE
"Please review for reconsideration. Pending auth #[id number]  Annmarie Lennon RN CM  Phone [phone]  Fax [phone]      Jackson Siegel (37 y.o. Female)     Date of Birth Social Security Number Address Home Phone MRN    1980  [address] [address] [phone] [id number]    Druze Marital Status          None Single       Admission Date Admission Type Admitting Provider Attending Provider Department, Room/Bed    18 Emergency Roxanne Miller MD Mini, Jocelyn, MD [address], S569/1    Discharge Date Discharge Disposition Discharge Destination                      Attending Provider: Roxanne Miller MD     Allergies:  No Known Allergies    Isolation:  None   Infection:  None   Code Status:  FULL    Ht:  172.7 cm (67.99\")   Wt:  78.1 kg (172 lb 3.2 oz)    Admission Cmt:  None   Principal Problem:  Crohn's disease of both small and large intestine without complication [K50.80]                 Active Insurance as of 2018     Primary Coverage     Payor Plan Insurance Group Employer/Plan Group    [facility] BLUE [employer] [facility] [employer] [id number]     Payor Plan Address Payor Plan Phone Number Effective From Effective To    [address] [phone] 10/1/2015     [place] [address]       Subscriber Name Subscriber Birth Date Member ID       JACKSON SIEGEL 1980 [id number]                 Emergency Contacts      (Rel.) Home Phone Work Phone Mobile Phone    Han Siegel (Father) [phone] -- --               History & Physical      Kerry Moreno MD at 2018  6:37 PM              [facility] Medicine Services  HISTORY AND PHYSICAL    Patient Name: Jackson Siegel  : 1980  MRN: [id number]  Primary Care Physician: Lori Mcmahon MD    Subjective   Subjective     Chief Complaint:  Abdominal Pain/ diarrhea    HPI:  Jackson Siegel is a 37 y.o. female with pmh significant for Crohn's " disease, anemia that presented to primary GI physician's office today, Dr. Meier for increasing left sided abdominal pain with intermittent diarrhea x 3 weeks. Patient states abdominal pains only on the left side with intermittent increase in cramping/sharp abdominal pain throughout.  Occasional nausea without vomiting and intermittent diarrhea that seems to worsen with by mouth intake.  He shouldn't has not had significant decrease in intake nor changes in appetite.  However she has had approximate 10 pound weight loss since late December.  She denies fever, chills.  She denies lightheadedness, dizziness, syncope.  Denies shortness of air, chest pain, palpitations.  Patient has been and is currently taking as scheduled Lialda, Humira, Imuran.    Patient CT abdomen and pelvis reviewed from 1/9/2018 reveals severe Crohn's flare of the transverse and descending colon that has developed while on biologic treatment. Patient work up reveals + UTI with crohn's flare, hgb 7.5, hct 26.6. Patient to be admitted to hospital medicine service.    Review of Systems   Constitutional: Positive for activity change, fatigue and unexpected weight change. Negative for appetite change, chills and fever.   HENT: Negative.    Eyes: Negative.    Respiratory: Negative.  Negative for shortness of breath.    Cardiovascular: Negative.  Negative for chest pain.   Gastrointestinal: Positive for abdominal distention, abdominal pain, diarrhea and nausea. Negative for blood in stool and vomiting.   Genitourinary: Negative for difficulty urinating, dysuria and frequency.   Musculoskeletal: Negative.    Skin: Negative.    Neurological: Positive for weakness.   Psychiatric/Behavioral: Negative.           Otherwise 10-system ROS reviewed and is negative except as mentioned in the HPI.    Personal History     Past Medical History:   Diagnosis Date   • Crohn disease        History reviewed. No pertinent surgical history.    Family History: family history  includes GI problems in her brother; No Known Problems in her father, maternal grandfather, maternal grandmother, mother, paternal grandfather, and paternal grandmother.     Social History:  reports that she has never smoked. She has never used smokeless tobacco. She reports that she does not drink alcohol or use illicit drugs.  Social History     Social History Narrative       Medications:    (Not in a hospital admission)    No Known Allergies    Objective   Objective     Vital Signs:   Temp:  [98.8 °F (37.1 °C)-99.7 °F (37.6 °C)] 99.7 °F (37.6 °C)  Heart Rate:  [] 87  Resp:  [16-18] 18  BP: (117-122)/(65-80) 117/65        Physical Exam   Constitutional: No acute distress, awake, alert  HENT: NCAT, mucous membranes moist  Respiratory: Clear to auscultation bilaterally, respiratory effort normal   Cardiovascular: RRR, no murmurs, rubs, or gallops, palpable pedal pulses bilaterally  Gastrointestinal: Positive bowel sounds, soft, tender to light palpation Left upper/ lower quadrants, nondistended  Musculoskeletal: No bilateral ankle edema  Psychiatric: Appropriate affect, cooperative  Neurologic: Oriented x 3, strength symmetric in all extremities, Cranial Nerves grossly intact to confrontation, speech clear  Skin: No rashes      Results Reviewed:  I have personally reviewed current lab, radiology, and data and agree.      Results from last 7 days  Lab Units 01/17/18  1543   WBC 10*3/mm3 9.63   HEMOGLOBIN g/dL 7.5*   HEMATOCRIT % 26.6*   PLATELETS 10*3/mm3 808*       Results from last 7 days  Lab Units 01/17/18  1543   SODIUM mmol/L 133   POTASSIUM mmol/L 3.7   CHLORIDE mmol/L 101   CO2 mmol/L 20.0   BUN mg/dL 11   CREATININE mg/dL 0.90   GLUCOSE mg/dL 92   CALCIUM mg/dL 9.1   ALT (SGPT) U/L 6*   AST (SGOT) U/L 9     Brief Urine Lab Results  (Last result in the past 365 days)      Color   Clarity   Blood   Leuk Est   Nitrite   Protein   CREAT   Urine HCG        01/17/18 1549               Negative         No  results found for: BNP  No results found for: PHART  Imaging Results (last 24 hours)     ** No results found for the last 24 hours. **             Assessment/Plan   Assessment / Plan     Hospital Problem List     * (Principal)Crohn's disease of both small and large intestine without complication    Acute cystitis with hematuria    Abdominal pain    Immunocompromised    Anemia            Assessment & Plan:  -- repeat IVF bolus 1L followed by NS +20K @100/hr  -- start solumedrol 80mg q6hr and consult CRS. Dr. Meier felt new biologic agent indicated. Will continue current agents and defer to CRS for changes  -- regular/ low residue diet for now  -- No noted leukocytosis, will cover acute flare and UTI with Zosyn, pending urine culture  -- prn percocet, morphine, zofran  -- repeat cbc, bmp, in am with ferritin, iron profile, monitor h/h. Transfuse hgb <7    DVT prophylaxis:mechanical  CODE STATUS:  Full Code    Admission Status:  I believe this patient meets INPATIENT status due to the need for care which can only be reasonably provided in an hospital setting such as aggressive/expedited ancillary services and/or consultation services, the necessity for IV medications, close physician monitoring and/or the possible need for procedures.  In such, I feel patient’s risk for adverse outcomes and need for care warrant INPATIENT evaluation and predict the patient’s care encounter to likely last beyond 2 midnights.      Jenn Baca, APRN   01/17/18   6:37 PM      Brief Attending Admission Attestation     I have seen and examined the patient, performing an independent face-to-face diagnostic evaluation with plan of care reviewed and developed with the advanced practice clinician (APC).      Brief Summary Statement/HPI:   Sultana Siegel is a 37 y.o. female with Crohn's since 1999, on Humira (last dose 1/14/18) Imuran and Lialda presented with 3 weeks of LUQ pain- dull constant ache with sharp pains with movement. No N/V.  1-2 non-bloody stools daily. CT Abd/Pel shows marked inflammatory changes in transverse and descending colon without fistula or descending colon. Denies dysuria. 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    Attending Physical Exam:  Markedly tender to palpation in LUQ, LLQ and suprapubic regions  CTAB, no wheezing    Brief Assessment/Plan :  See above for further detailed assessment and plan developed with APC which I have reviewed and/or edited.    - Crohn's flare: CT A/P shows extensive inflammation with no abscess or fistulas. IV Steroids. GI consult in AM. Continue home biologics for now. Zosyn for possible infectious cause of colitis. Obtain C. Diff toxin, urine histo antigen, fecal calprotectin, ESR, CRP  - Anemia: Transfuse for Hgb<7.0. Obtain iron studies  - Thrombocytosis: Likely reactive to anemia. Iron studies  - Asymptomatic pyuria: Recollect UA      Kerry Moreno MD  01/17/18  10:14 PM     \         Electronically signed by Kerry Moreno MD at 1/17/2018 10:31 PM        Vital Signs (last 72 hrs)       01/16 0700  -  01/17 0659 01/17 0700  -  01/18 0659 01/18 0700  -  01/19 0659 01/19 0700  -  01/19 0944   Most Recent    Temp (°F)   96 -  99.7    97.3 -  97.9      97.7     97.7 (36.5)    Heart Rate   69 -  120    63 -  80      57     57    Resp   16 -  18    16 -  18      18     18    BP   98/60 -  122/80    90/51 -  104/69      100/68     100/68    SpO2 (%)   95 -  99      92      97     97          Hospital Medications (all)       Dose Frequency Start End    acetaminophen (TYLENOL) tablet 650 mg 650 mg Every 4 Hours PRN 1/17/2018     Sig - Route: Take 2 tablets by mouth Every 4 (Four) Hours As Needed for Mild Pain . - Oral    azaTHIOprine (IMURAN) tablet 100 mg 100 mg Daily 1/18/2018     Sig - Route: Take 2 tablets by mouth Daily. - Oral    cetirizine (zyrTEC) tablet 10 mg 10 mg Daily 1/18/2018     Sig - Route: Take 1 tablet by mouth Daily. - Oral    famotidine (PEPCID) tablet 40 mg 40 mg Daily 1/17/2018     Sig -  "Route: Take 2 tablets by mouth Daily. - Oral    ferric gluconate (FERRLECIT) 125 mg in sodium chloride 0.9 % 260 mL IVPB 125 mg Daily 1/18/2018 1/21/2018    Sig - Route: Infuse 125 mg into a venous catheter Daily. - Intravenous    mesalamine (LIALDA) EC tablet 1.2 g 1.2 g Once 1/19/2018     Sig - Route: Take 1 tablet by mouth 1 (One) Time. - Oral    mesalamine (LIALDA) EC tablet 2.4 g 2.4 g Daily With Breakfast 1/20/2018     Sig - Route: Take 2 tablets by mouth Daily With Breakfast. - Oral    methylPREDNISolone sodium succinate (SOLU-Medrol) injection 20 mg 20 mg Every 12 Hours 1/18/2018     Sig - Route: Infuse 0.5 mL into a venous catheter Every 12 (Twelve) Hours. - Intravenous    Morphine sulfate (PF) injection 1 mg 1 mg Every 4 Hours PRN 1/17/2018 1/27/2018    Sig - Route: Infuse 0.5 mL into a venous catheter Every 4 (Four) Hours As Needed for Moderate Pain . - Intravenous    Linked Group 1:  \"And\" Linked Group Details        naloxone (NARCAN) injection 0.4 mg 0.4 mg Every 5 Minutes PRN 1/17/2018     Sig - Route: Infuse 1 mL into a venous catheter Every 5 (Five) Minutes As Needed for Respiratory Depression. - Intravenous    Linked Group 1:  \"And\" Linked Group Details        ondansetron (ZOFRAN) injection 4 mg 4 mg Every 6 Hours PRN 1/17/2018     Sig - Route: Infuse 2 mL into a venous catheter Every 6 (Six) Hours As Needed for Nausea or Vomiting. - Intravenous    Linked Group 2:  \"Or\" Linked Group Details        ondansetron (ZOFRAN) tablet 4 mg 4 mg Every 6 Hours PRN 1/17/2018     Sig - Route: Take 1 tablet by mouth Every 6 (Six) Hours As Needed for Nausea or Vomiting. - Oral    Linked Group 2:  \"Or\" Linked Group Details        oxyCODONE-acetaminophen (PERCOCET) 5-325 MG per tablet 1 tablet 1 tablet Every 4 Hours PRN 1/17/2018 1/27/2018    Sig - Route: Take 1 tablet by mouth Every 4 (Four) Hours As Needed for Moderate Pain . - Oral    piperacillin-tazobactam (ZOSYN) 3.375 g in iso-osmotic dextrose 50 ml (premix) " 3.375 g Once 1/17/2018     Sig - Route: Infuse 50 mL into a venous catheter 1 (One) Time. - Intravenous    piperacillin-tazobactam (ZOSYN) 3.375 g in iso-osmotic dextrose 50 ml (premix) 3.375 g Every 8 Hours 1/18/2018 1/25/2018    Sig - Route: Infuse 50 mL into a venous catheter Every 8 (Eight) Hours. - Intravenous    rifAXIMin (XIFAXAN) tablet 200 mg 200 mg 3 Times Daily 1/17/2018     Sig - Route: Take 1 tablet by mouth 3 (Three) Times a Day. - Oral    sodium chloride 0.9 % bolus 1,000 mL 1,000 mL Once 1/17/2018 1/17/2018    Sig - Route: Infuse 1,000 mL into a venous catheter 1 (One) Time. - Intravenous    sodium chloride 0.9 % bolus 1,000 mL 1,000 mL Once 1/17/2018     Sig - Route: Infuse 1,000 mL into a venous catheter 1 (One) Time. - Intravenous    sodium chloride 0.9 % flush 1-10 mL 1-10 mL As Needed 1/17/2018     Sig - Route: Infuse 1-10 mL into a venous catheter As Needed for Line Care. - Intravenous    sodium chloride 0.9 % flush 10 mL 10 mL As Needed 1/17/2018     Sig - Route: Infuse 10 mL into a venous catheter As Needed for Line Care. - Intravenous    Cosign for Ordering: Accepted by Oscar Sal MD on 1/17/2018 11:09 PM    sodium chloride 0.9 % with KCl 20 mEq/L infusion 100 mL/hr Continuous 1/17/2018     Sig - Route: Infuse 100 mL/hr into a venous catheter Continuous. - Intravenous    mesalamine (LIALDA) EC tablet 1.2 g (Discontinued) 1,200 mg Daily With Breakfast 1/18/2018 1/19/2018    Sig - Route: Take 1 tablet by mouth Daily With Breakfast. - Oral    methylPREDNISolone sodium succinate (SOLU-Medrol) injection 80 mg (Discontinued) 80 mg Every 6 Hours Scheduled 1/17/2018 1/18/2018    Sig - Route: Infuse 1.28 mL into a venous catheter Every 6 (Six) Hours. - Intravenous          Lab Results (last 72 hours)     Procedure Component Value Units Date/Time    POCT pregnancy, urine [[id number]]  (Normal) Collected:  01/17/18 1549    Specimen:  Urine Updated:  01/17/18 1550     HCG, Urine, QL Negative     Lot  Number HCG [id number]     Internal Positive Control Positive     Internal Negative Control Negative    Urinalysis With / Culture If Indicated - Urine, Clean Catch [[id number]]  (Abnormal) Collected:  01/17/18 1543    Specimen:  Urine from Urine, Clean Catch Updated:  01/17/18 1610     Color, UA Orange (A)     Appearance, UA Cloudy (A)     pH, UA 5.5     Specific Gravity, UA 1.031 (H)     Glucose, UA Negative     Ketones, UA Trace (A)     Bilirubin, UA Moderate (2+) (A)     Blood, UA Large (3+) (A)     Protein,  mg/dL (2+) (A)     Leuk Esterase, UA Small (1+) (A)     Nitrite, UA Positive (A)     Urobilinogen, UA 1.0 E.U./dL    Narrative:       Any Substance that causes an abnormal urine color can alter the accuracy of the chemical reactions.    Urinalysis, Microscopic Only - Urine, Clean Catch [[id number]]  (Abnormal) Collected:  01/17/18 1543    Specimen:  Urine from Urine, Clean Catch Updated:  01/17/18 1614     RBC, UA Too Numerous to Count (A) /HPF      WBC, UA 6-12 (A) /HPF      Bacteria, UA 2+ (A) /HPF      Squamous Epithelial Cells, UA 3-6 (A) /HPF      Hyaline Casts, UA 0-6 /LPF      Fine Granular Casts, UA 3-6 /LPF      Mucus, UA Large/3+ (A) /HPF      Methodology Automated Microscopy    CBC & Differential [[id number]] Collected:  01/17/18 1543    Specimen:  Blood Updated:  01/17/18 1616    Narrative:       The following orders were created for panel order CBC & Differential.  Procedure                               Abnormality         Status                     ---------                               -----------         ------                     CBC Auto Differential[[id number]]        Abnormal            Final result                 Please view results for these tests on the individual orders.    CBC Auto Differential [[id number]]  (Abnormal) Collected:  01/17/18 1543    Specimen:  Blood Updated:  01/17/18 1616     WBC 9.63 10*3/mm3      RBC 4.03 10*6/mm3      Hemoglobin 7.5 (L) g/dL      Hematocrit 26.6  (L) %      MCV 66.0 (L) fL      MCH 18.6 (L) pg      MCHC 28.2 (L) g/dL      RDW 17.4 (H) %      RDW-SD 42.3 fl      MPV 8.0 fL      Platelets 808 (H) 10*3/mm3      Neutrophil % 73.8 (H) %      Lymphocyte % 13.2 (L) %      Monocyte % 10.4 %      Eosinophil % 2.0 %      Basophil % 0.3 %      Immature Grans % 0.3 %      Neutrophils, Absolute 7.11 10*3/mm3      Lymphocytes, Absolute 1.27 10*3/mm3      Monocytes, Absolute 1.00 10*3/mm3      Eosinophils, Absolute 0.19 10*3/mm3      Basophils, Absolute 0.03 10*3/mm3      Immature Grans, Absolute 0.03 10*3/mm3     Comprehensive Metabolic Panel [[id number]]  (Abnormal) Collected:  01/17/18 1543    Specimen:  Blood Updated:  01/17/18 1630     Glucose 92 mg/dL      BUN 11 mg/dL      Creatinine 0.90 mg/dL      Sodium 133 mmol/L      Potassium 3.7 mmol/L      Chloride 101 mmol/L      CO2 20.0 mmol/L      Calcium 9.1 mg/dL      Total Protein 7.9 g/dL      Albumin 3.70 g/dL      ALT (SGPT) 6 (L) U/L      AST (SGOT) 9 U/L      Alkaline Phosphatase 85 U/L      Total Bilirubin 0.3 mg/dL      eGFR Non African Amer 70 mL/min/1.73      Globulin 4.2 gm/dL      A/G Ratio 0.9 (L) g/dL      BUN/Creatinine Ratio 12.2     Anion Gap 12.0 (H) mmol/L     Narrative:       National Kidney Foundation Guidelines    Stage     Description        GFR  1         Normal or High     90+  2         Mild decrease      60-89  3         Moderate decrease  30-59  4         Severe decrease    15-29  5         Kidney failure     <15    Lipase [[id number]]  (Normal) Collected:  01/17/18 1543    Specimen:  Blood Updated:  01/17/18 1630     Lipase 26 U/L     Light Blue Top [[id number]] Collected:  01/17/18 1543    Specimen:  Blood Updated:  01/17/18 1646     Extra Tube hold for add-on      Auto resulted       Green Top (Gel) [[id number]] Collected:  01/17/18 1543    Specimen:  Blood Updated:  01/17/18 1646     Extra Tube Hold for add-ons.      Auto resulted.       Leslie Spencer [[id number]] Collected:  01/17/18 5974     Specimen:  Blood Updated:  01/17/18 1646     Extra Tube hold for add-on      Auto resulted       Gold Top - SST [[id number]] Collected:  01/17/18 1543    Specimen:  Blood Updated:  01/17/18 1646     Extra Tube Hold for add-ons.      Auto resulted.       [place] Draw [[id number]] Collected:  01/17/18 1543    Specimen:  Blood Updated:  01/17/18 1646    Narrative:       The following orders were created for panel order [place] Draw.  Procedure                               Abnormality         Status                     ---------                               -----------         ------                     Light Blue Top[[id number]]                                   Final result               Green Top (Gel)[[id number]]                                  Final result               Lavender Top[[id number]]                                     Final result               Gold Top - SST[[id number]]                                   Final result               Green Top (No Gel)[[id number]]                                                            Please view results for these tests on the individual orders.    Reticulocytes [[id number]]  (Normal) Collected:  01/17/18 1543    Specimen:  Blood Updated:  01/17/18 2337     Reticulocyte % 0.91 %     Iron Profile [[id number]]  (Abnormal) Collected:  01/17/18 1543    Specimen:  Blood Updated:  01/17/18 2346     Iron 4 (L) mcg/dL      TIBC 343 mcg/dL      Iron Saturation 1 (L) %     Ferritin [[id number]]  (Normal) Collected:  01/17/18 1543    Specimen:  Blood Updated:  01/18/18 0025     Ferritin 34.00 ng/mL     C-reactive Protein [[id number]]  (Abnormal) Collected:  01/17/18 1543    Specimen:  Blood Updated:  01/18/18 0049     C-Reactive Protein 19.86 (H) mg/dL       Result checked       Sedimentation Rate [[id number]]  (Abnormal) Collected:  01/18/18 0025    Specimen:  Blood Updated:  01/18/18 0057     Sed Rate 105 (H) mm/hr     CBC (No Diff) [[id number]]  (Abnormal) Collected:  01/18/18 0542     Specimen:  Blood Updated:  01/18/18 0715     WBC 6.41 10*3/mm3      RBC 4.14 10*6/mm3      Hemoglobin 7.8 (L) g/dL      Hematocrit 28.2 (L) %      MCV 68.1 (L) fL      MCH 18.8 (L) pg      MCHC 27.7 (L) g/dL      RDW 17.6 (H) %      RDW-SD 44.0 fl      MPV 8.1 fL      Platelets 768 (H) 10*3/mm3     Basic Metabolic Panel [[id number]]  (Abnormal) Collected:  01/18/18 0542    Specimen:  Blood Updated:  01/18/18 0741     Glucose 166 (H) mg/dL      BUN 11 mg/dL      Creatinine 0.80 mg/dL      Sodium 136 mmol/L      Potassium 4.5 mmol/L      Chloride 108 mmol/L      CO2 22.0 mmol/L      Calcium 8.9 mg/dL      eGFR Non African Amer 80 mL/min/1.73      BUN/Creatinine Ratio 13.8     Anion Gap 6.0 mmol/L     Narrative:       National Kidney Foundation Guidelines    Stage     Description        GFR  1         Normal or High     90+  2         Mild decrease      60-89  3         Moderate decrease  30-59  4         Severe decrease    15-29  5         Kidney failure     <15    Histoplasma Ag Ur - Urine, Clean Catch [[id number]] Collected:  01/18/18 0745    Specimen:  Urine from Urine, Clean Catch Updated:  01/18/18 0805    Urinalysis With / Microscopic If Indicated - Urine, Clean Catch [[id number]]  (Abnormal) Collected:  01/18/18 0745    Specimen:  Urine from Urine, Clean Catch Updated:  01/18/18 0812     Color, UA Dark Yellow (A)     Appearance, UA Cloudy (A)     pH, UA 5.5     Specific Gravity, UA 1.026     Glucose, UA Negative     Ketones, UA Negative     Bilirubin, UA Small (1+) (A)     Blood, UA Moderate (2+) (A)     Protein, UA 30 mg/dL (1+) (A)     Leuk Esterase, UA Negative     Nitrite, UA Negative     Urobilinogen, UA 1.0 E.U./dL    Urinalysis, Microscopic Only - Urine, Clean Catch [[id number]]  (Abnormal) Collected:  01/18/18 0745    Specimen:  Urine from Urine, Clean Catch Updated:  01/18/18 0827     RBC, UA 31-50 (A) /HPF      WBC, UA 0-2 (A) /HPF      Bacteria, UA None Seen /HPF      Squamous Epithelial Cells, UA 0-2  /HPF      Hyaline Casts, UA 0-6 /LPF      Fine Granular Casts, UA 0-2 /LPF      Mucus, UA Trace /HPF      Methodology Manual Light Microscopy    Calprotectin, Fecal - Stool, Per Rectum [[id number]] Collected:  01/18/18 1716    Specimen:  Stool from Per Rectum Updated:  01/18/18 1717    Clostridium Difficile Toxin - Stool, Per Rectum [[id number]] Collected:  01/18/18 1716    Specimen:  Stool from Per Rectum Updated:  01/18/18 1821    Narrative:       The following orders were created for panel order Clostridium Difficile Toxin - Stool, Per Rectum.  Procedure                               Abnormality         Status                     ---------                               -----------         ------                     Clostridium Difficile To...[[id number]]  Normal              Final result                 Please view results for these tests on the individual orders.    Clostridium Difficile Toxin, PCR - Stool, Per Rectum [[id number]]  (Normal) Collected:  01/18/18 1716    Specimen:  Stool from Per Rectum Updated:  01/18/18 1821     C. Difficile Toxins by PCR Not Detected    Narrative:         Performance characteristics of test not established for patients <2 years of age.  Negative for Toxigenic C. Difficile    Adalimumab+Ab (Serial Monitor) [[id number]] Collected:  01/18/18 1943    Specimen:  Blood Updated:  01/18/18 2002    Thiopurine Metabolites [[id number]] Collected:  01/18/18 1943    Specimen:  Blood Updated:  01/19/18 0807    Urine Culture - Urine, Urine, Clean Catch [[id number]] Collected:  01/17/18 1543    Specimen:  Urine from Urine, Clean Catch Updated:  01/19/18 0920     Urine Culture --      50,000-60,000 CFU/mL Normal Urogenital Shante          Imaging Results (last 72 hours)     ** No results found for the last 72 hours. **           Physician Progress Notes (last 72 hours) (Notes from 1/16/2018  9:44 AM through 1/19/2018  9:44 AM)      Roxanne Miller MD at 1/18/2018 11:30 AM  Version 1 of 1              [facility] Medicine Services  PROGRESS NOTE    Patient Name: Sultana Siegel  : 1980  MRN: [id number]    Date of Admission: 2018  Length of Stay: 1  Primary Care Physician: Lori Mcmahon MD    Subjective   Subjective     CC:  crampy abd pain    HPI:  Feels same.    Is not interested in surgery.   Menstruates monthly  Intolerant of po iron    Review of Systems   Gen- No fevers, chills  CV- No chest pain, palpitations  Resp- No cough, dyspnea    Otherwise ROS is negative except as mentioned in the HPI.    Objective   Objective     Vital Signs:   Temp:  [96 °F (35.6 °C)-99.7 °F (37.6 °C)] 97.3 °F (36.3 °C)  Heart Rate:  [] 66  Resp:  [16-18] 16  BP: ()/(51-80) 90/51        Physical Exam:  Gen:  WD/WN young woman in NAD  Neuro: alert and oriented, clear speech, follows commands, grossly nonfocal  HEENT:  NC/AT PERRL, OP benign  Neck:  Supple, no LAD  Heart RRR no murmur, rub, or gallop  Lungs CTA nonabored  Abd:  Soft, with lower abd tenderness. no rebound or guarding, pos BS  Extrem:  No c/c/e  Results Reviewed:  I have personally reviewed current lab, radiology, and data and agree.      Results from last 7 days  Lab Units 18  0542 18  1543   WBC 10*3/mm3 6.41 9.63   HEMOGLOBIN g/dL 7.8* 7.5*   HEMATOCRIT % 28.2* 26.6*   PLATELETS 10*3/mm3 768* 808*       Results from last 7 days  Lab Units 18  0542 18  1543   SODIUM mmol/L 136 133   POTASSIUM mmol/L 4.5 3.7   CHLORIDE mmol/L 108 101   CO2 mmol/L 22.0 20.0   BUN mg/dL 11 11   CREATININE mg/dL 0.80 0.90   GLUCOSE mg/dL 166* 92   CALCIUM mg/dL 8.9 9.1   ALT (SGPT) U/L  --  6*   AST (SGOT) U/L  --  9     No results found for: BNP  No results found for: PHART    Microbiology Results Abnormal     Procedure Component Value - Date/Time    Urine Culture - Urine, Urine, Clean Catch [[id number]]  (Normal) Collected:  18 0277    Lab Status:  Preliminary result Specimen:  Urine from Urine,  Clean Catch Updated:  01/18/18 0813     Urine Culture No growth          Imaging Results (last 24 hours)     ** No results found for the last 24 hours. **             I have reviewed the medications.    Assessment/Plan   Assessment / Plan     Hospital Problem List     * (Principal)Crohn's disease of both small and large intestine without complication    Abdominal pain    Immunocompromised    Acute cystitis with hematuria    Anemia             Brief Hospital Course to date:  Sultana Siegel is a 37 y.o. female with Crohn's, formerly responding well to Humira (1/14) Imuran and Lialda, now with 3 weeks of LUQ pain and CT Abd/Pel shows marked inflammatory changes in transverse and descending colon without fistula or descending colon.  Sent for admission by Dr. Meier.     Assessment & Plan:    Crohns flare despite maximal medical therapy  -- supportive care  -- solumedrol 80mg q6hr   --consult CRS and GI  --  Dr. Meier felt new biologic agent indicated but surgery must be considered. (she has declined in the past)  -  Possibly infectious colitis (doubt)  -- zosyn empirically    -- pending urine histo antigen, fecal calprotectin, ESR, CRP    Anemia Fe deficiency severe - intolerant of po  -- Transfuse for Hgb<7.0.   -- IV iron ordered    Thrombocytosis: Likely reactive to anemia. Iron studies  Asymptomatic pyuria:do not treat        DVT Prophylaxis:      CODE STATUS: Full Code    Disposition: I expect the patient to be discharged home in 4 days.    Roxanne Miller MD  01/18/18  11:30 AM           Electronically signed by Roaxnne Miller MD at 1/18/2018 11:56 AM           Consult Notes (last 72 hours) (Notes from 1/16/2018  9:44 AM through 1/19/2018  9:44 AM)      DINA Lane at 1/18/2018  1:49 PM  Version 2 of 2     Consult Orders:    1. Inpatient Consult to Gastroenterology [[id number]] ordered by Roxanne Miller MD at 01/18/18 1120           Attestation signed by Mark I Brunner, MD at 1/18/2018  6:15 PM        I have  reviewed the documentation above and agree. Patient seen and examined. She has had ileocolonic Crohn's since age 19. Took Remicade for about 4 years with clinical remission, but stopped due to sustained remission. Has been on Humira for a few years, but has had flares this year. Recently restarted Imuran. Her last dose of Humira was 1/14/17. Has had dramatic improvement in symptoms since starting steroids this admission.    >> Will check Humira level and antibodies, as well as thiopurine metabolites.  >> Pending above, may need new class of drug, dose adjustment of Humira, or a different anti-TNF agent.                                 [facility]    Referring Provider: Roxanne Miller MD   PCP: Lori Mcmahon MD    Reason for Consultation: Crohn's flare    Chief complaint: Diarrhea, abdominal pain    History of present illness:    Sultana Siegel is a 37 y.o. female who is admitted with a Crohn's flare.  She is followed by Dr. Meier outpatient and on Humira, Imuran and Lialda.  She has done well on this regimen for several years and reports symptoms of diarrhea for two months.  Stool is described as 1-3 liquid bowel movements daily without blood.  No nocturnal bowel movements.  She began having left sided abdominal pain three weeks ago.  Her last Humira dose was 1/14/18.  She denies fever, chills, nausea or vomiting.  States she has a good appetite but has lost 10 lbs in two months.    She has been started on IV steroids last night and notes improvement in her symptoms today.  Abdominal pain is improved and no bowel movement today.      Allergies:  Review of patient's allergies indicates no known allergies.    Scheduled Meds:    azaTHIOprine 100 mg Oral Daily   cetirizine 10 mg Oral Daily   famotidine 40 mg Oral Daily   ferric gluconate (FERRLECIT) IVPB 125 mg Intravenous Daily   mesalamine 1,200 mg Oral Daily With Breakfast   methylPREDNISolone sodium succinate 80 mg Intravenous Q6H    piperacillin-tazobactam 3.375 g Intravenous Once   piperacillin-tazobactam 3.375 g Intravenous Q8H   rifAXIMin 200 mg Oral TID   sodium chloride 1,000 mL Intravenous Once        Infusions:    sodium chloride 0.9 % with KCl 20 mEq 100 mL/hr Last Rate: 100 mL/hr (01/17/18 2045)       PRN Meds:  •  acetaminophen  •  Morphine **AND** naloxone  •  ondansetron **OR** ondansetron  •  oxyCODONE-acetaminophen  •  sodium chloride  •  sodium chloride    Home Meds:  Prescriptions Prior to Admission   Medication Sig Dispense Refill Last Dose   • adalimumab (HUMIRA) 40 MG/0.8ML Prefilled Syringe Kit injection Inject 40 mg under the skin Every 14 (Fourteen) Days. SUNDAYS   1/14/2018   • azaTHIOprine (IMURAN) 50 MG tablet Take 2 tablets by mouth Daily. 60 tablet 11 1/17/2018 at Unknown time   • levocetirizine (XYZAL) 5 MG tablet Take 5 mg by mouth Every Evening.   1/17/2018 at Unknown time   • mesalamine (LIALDA) 1.2 g EC tablet Take 1 tablet by mouth Daily With Breakfast. (Patient taking differently: Take 1,200 mg by mouth Daily With Breakfast. 2 TABS EVERY MORNING) 60 tablet 11 1/17/2018 at Unknown time   • rifAXIMin (XIFAXAN) 200 MG tablet Take 200 mg by mouth 3 (Three) Times a Day.   1/17/2018 at Unknown time       ROS: Review of Systems   Constitutional: Positive for fatigue. Negative for chills and fever.   HENT: Negative for trouble swallowing and voice change.    Respiratory: Negative for shortness of breath.    Cardiovascular: Negative for chest pain and palpitations.   Gastrointestinal: Positive for abdominal pain and diarrhea. Negative for nausea and vomiting.   Endocrine: Negative.    Genitourinary: Negative.    Musculoskeletal: Positive for arthralgias.   Skin: Negative.    Neurological: Negative.    Hematological: Negative.    Psychiatric/Behavioral: Negative.        PAST MED HX:  Past Medical History:   Diagnosis Date   • Crohn disease        PAST SURG HX:  History reviewed. No pertinent surgical history.    FAM  "HX:  Family History   Problem Relation Age of Onset   • No Known Problems Mother    • No Known Problems Father    • GI problems Brother    • No Known Problems Maternal Grandmother    • No Known Problems Maternal Grandfather    • No Known Problems Paternal Grandmother    • No Known Problems Paternal Grandfather        SOC HX:  Social History     Social History   • Marital status: Single     Spouse name: N/A   • Number of children: N/A   • Years of education: N/A     Occupational History   • Not on file.     Social History Main Topics   • Smoking status: Never Smoker   • Smokeless tobacco: Never Used   • Alcohol use No   • Drug use: No   • Sexual activity: Not on file     Other Topics Concern   • Not on file     Social History Narrative       PHYSICAL EXAM  /59 (BP Location: Left arm, Patient Position: Lying)  Pulse 80  Temp 97.9 °F (36.6 °C) (Axillary)   Resp 18  Ht 172.7 cm (68\")  Wt 78.1 kg (172 lb 3.2 oz)  SpO2 98%  BMI 26.18 kg/m2  Wt Readings from Last 3 Encounters:   01/17/18 78.1 kg (172 lb 3.2 oz)   01/17/18 76.7 kg (169 lb)   09/14/17 80.3 kg (177 lb)   ,body mass index is 26.18 kg/(m^2).  Physical Exam   Constitutional: She is oriented to person, place, and time. She appears well-developed. No distress.   Neck: Normal range of motion.   Cardiovascular: Normal rate and regular rhythm.    Pulmonary/Chest: Effort normal and breath sounds normal. No respiratory distress. She has no wheezes.   Abdominal: Soft. Bowel sounds are normal. She exhibits no distension.   Mild tenderness to palpation of the left upper quadrant.  No guarding nor rebound.     Musculoskeletal: She exhibits no edema.   Neurological: She is alert and oriented to person, place, and time.   Skin: Skin is warm and dry. She is not diaphoretic.   Psychiatric: She has a normal mood and affect. Her behavior is normal.         Results Review:   I reviewed the patient's new clinical results.    Lab Results   Component Value Date    WBC " 6.41 01/18/2018    HGB 7.8 (L) 01/18/2018    HGB 7.5 (L) 01/17/2018    HGB 9.2 (L) 09/14/2017    HCT 28.2 (L) 01/18/2018    MCV 68.1 (L) 01/18/2018     (H) 01/18/2018       No results found for: INR    Lab Results   Component Value Date    GLUCOSE 166 (H) 01/18/2018    BUN 11 01/18/2018    CREATININE 0.80 01/18/2018    EGFRIFNONA 80 01/18/2018    BCR 13.8 01/18/2018    CO2 22.0 01/18/2018    CALCIUM 8.9 01/18/2018    ALBUMIN 3.70 01/17/2018    AST 9 01/17/2018    ALT 6 (L) 01/17/2018     CT abdomen/pelvis 1/9/18 - marked inflammatory changes of the transverse and descending colon.  No abscess or fistula.   Iron 4. Ferritin 34.  Iron saturation 1%.  TIBC 343.    CRP 19.86.  Sed rate 105.      ASSESSMENTS/PLANS    1. Crohn's flare  2. Chronic blood loss anemia  3. Iron deficiency     >>> C. Difficile, Stool culture and Fecal calprotectin pending.    >>> Will check Humira concentration levels as well as level of antibodies (Adalimumab concentration and Ab).  Last dose of Humira was 1/14/18.  Will also check  Imuran levels (thiopurine metabolites) .    >>> Decrease IV steroids to IV Solumedrol 20mg q12h   >>> IV Iron    I discussed the patients findings and my recommendations with patient    DINA Lane  01/18/18  1:49 PM       Electronically signed by Mark I Brunner, MD at 1/18/2018  6:15 PM      DINA Lane at 1/18/2018  1:49 PM  Version 1 of 2         [facility] [place] [facility] Consult    Referring Provider: Roxanne Miller MD   PCP: Lori Mcmahon MD    Reason for Consultation: Crohn's flare    Chief complaint: Diarrhea, abdominal pain    History of present illness:    Sultana Siegel is a 37 y.o. female who is admitted with a Crohn's flare.  She is followed by Dr. Meier outpatient and on Humira, Imuran and Lialda.  She has done well on this regimen for several years and reports symptoms of diarrhea for two months.  Stool is described as 1-3 liquid bowel movements daily without blood.  No nocturnal  bowel movements.  She began having left sided abdominal pain three weeks ago.  Her last Humira dose was 1/14/18.  She denies fever, chills, nausea or vomiting.  States she has a good appetite but has lost 10 lbs in two months.    She has been started on IV steroids last night and notes improvement in her symptoms today.  Abdominal pain is improved and no bowel movement today.      Allergies:  Review of patient's allergies indicates no known allergies.    Scheduled Meds:    azaTHIOprine 100 mg Oral Daily   cetirizine 10 mg Oral Daily   famotidine 40 mg Oral Daily   ferric gluconate (FERRLECIT) IVPB 125 mg Intravenous Daily   mesalamine 1,200 mg Oral Daily With Breakfast   methylPREDNISolone sodium succinate 80 mg Intravenous Q6H   piperacillin-tazobactam 3.375 g Intravenous Once   piperacillin-tazobactam 3.375 g Intravenous Q8H   rifAXIMin 200 mg Oral TID   sodium chloride 1,000 mL Intravenous Once        Infusions:    sodium chloride 0.9 % with KCl 20 mEq 100 mL/hr Last Rate: 100 mL/hr (01/17/18 2045)       PRN Meds:  •  acetaminophen  •  Morphine **AND** naloxone  •  ondansetron **OR** ondansetron  •  oxyCODONE-acetaminophen  •  sodium chloride  •  sodium chloride    Home Meds:  Prescriptions Prior to Admission   Medication Sig Dispense Refill Last Dose   • adalimumab (HUMIRA) 40 MG/0.8ML Prefilled Syringe Kit injection Inject 40 mg under the skin Every 14 (Fourteen) Days. SUNDAYS   1/14/2018   • azaTHIOprine (IMURAN) 50 MG tablet Take 2 tablets by mouth Daily. 60 tablet 11 1/17/2018 at Unknown time   • levocetirizine (XYZAL) 5 MG tablet Take 5 mg by mouth Every Evening.   1/17/2018 at Unknown time   • mesalamine (LIALDA) 1.2 g EC tablet Take 1 tablet by mouth Daily With Breakfast. (Patient taking differently: Take 1,200 mg by mouth Daily With Breakfast. 2 TABS EVERY MORNING) 60 tablet 11 1/17/2018 at Unknown time   • rifAXIMin (XIFAXAN) 200 MG tablet Take 200 mg by mouth 3 (Three) Times a Day.   1/17/2018 at  "Unknown time       ROS: Review of Systems   Constitutional: Positive for fatigue. Negative for chills and fever.   HENT: Negative for trouble swallowing and voice change.    Respiratory: Negative for shortness of breath.    Cardiovascular: Negative for chest pain and palpitations.   Gastrointestinal: Positive for abdominal pain and diarrhea. Negative for nausea and vomiting.   Endocrine: Negative.    Genitourinary: Negative.    Musculoskeletal: Positive for arthralgias.   Skin: Negative.    Neurological: Negative.    Hematological: Negative.    Psychiatric/Behavioral: Negative.        PAST MED HX:  Past Medical History:   Diagnosis Date   • Crohn disease        PAST SURG HX:  History reviewed. No pertinent surgical history.    FAM HX:  Family History   Problem Relation Age of Onset   • No Known Problems Mother    • No Known Problems Father    • GI problems Brother    • No Known Problems Maternal Grandmother    • No Known Problems Maternal Grandfather    • No Known Problems Paternal Grandmother    • No Known Problems Paternal Grandfather        SOC HX:  Social History     Social History   • Marital status: Single     Spouse name: N/A   • Number of children: N/A   • Years of education: N/A     Occupational History   • Not on file.     Social History Main Topics   • Smoking status: Never Smoker   • Smokeless tobacco: Never Used   • Alcohol use No   • Drug use: No   • Sexual activity: Not on file     Other Topics Concern   • Not on file     Social History Narrative       PHYSICAL EXAM  /59 (BP Location: Left arm, Patient Position: Lying)  Pulse 80  Temp 97.9 °F (36.6 °C) (Axillary)   Resp 18  Ht 172.7 cm (68\")  Wt 78.1 kg (172 lb 3.2 oz)  SpO2 98%  BMI 26.18 kg/m2  Wt Readings from Last 3 Encounters:   01/17/18 78.1 kg (172 lb 3.2 oz)   01/17/18 76.7 kg (169 lb)   09/14/17 80.3 kg (177 lb)   ,body mass index is 26.18 kg/(m^2).  Physical Exam   Constitutional: She is oriented to person, place, and time. She " appears well-developed. No distress.   Neck: Normal range of motion.   Cardiovascular: Normal rate and regular rhythm.    Pulmonary/Chest: Effort normal and breath sounds normal. No respiratory distress. She has no wheezes.   Abdominal: Soft. Bowel sounds are normal. She exhibits no distension.   Mild tenderness to palpation of the left upper quadrant.  No guarding nor rebound.     Musculoskeletal: She exhibits no edema.   Neurological: She is alert and oriented to person, place, and time.   Skin: Skin is warm and dry. She is not diaphoretic.   Psychiatric: She has a normal mood and affect. Her behavior is normal.         Results Review:   I reviewed the patient's new clinical results.    Lab Results   Component Value Date    WBC 6.41 01/18/2018    HGB 7.8 (L) 01/18/2018    HGB 7.5 (L) 01/17/2018    HGB 9.2 (L) 09/14/2017    HCT 28.2 (L) 01/18/2018    MCV 68.1 (L) 01/18/2018     (H) 01/18/2018       No results found for: INR    Lab Results   Component Value Date    GLUCOSE 166 (H) 01/18/2018    BUN 11 01/18/2018    CREATININE 0.80 01/18/2018    EGFRIFNONA 80 01/18/2018    BCR 13.8 01/18/2018    CO2 22.0 01/18/2018    CALCIUM 8.9 01/18/2018    ALBUMIN 3.70 01/17/2018    AST 9 01/17/2018    ALT 6 (L) 01/17/2018     CT abdomen/pelvis 1/9/18 - marked inflammatory changes of the transverse and descending colon.  No abscess or fistula.   Iron 4. Ferritin 34.  Iron saturation 1%.  TIBC 343.    CRP 19.86.  Sed rate 105.      ASSESSMENTS/PLANS    1. Crohn's flare  2. Chronic blood loss anemia  3. Iron deficiency     >>> C. Difficile, Stool culture and Fecal calprotectin pending.    >>> Will check Humira concentration levels as well as level of antibodies (Adalimumab concentration and Ab).  Last dose of Humira was 1/14/18.  Will also check  Imuran levels (thiopurine metabolites) .    >>> Decrease IV steroids to IV Solumedrol 20mg q12h     I discussed the patients findings and my recommendations with patient    Nini  DINA Erickson  01/18/18  1:49 PM       Electronically signed by DINA Lane at 1/18/2018  3:30 PM

## 2018-01-19 NOTE — PROGRESS NOTES
Continued Stay Note   Bryan     Patient Name: Sultana Siegel  MRN: [id number]  Today's Date: 1/19/2018    Admit Date: 1/17/2018          Discharge Plan       01/19/18 1452    Case Management/Social Work Plan    Plan [facility] Call    Patient/Family In Agreement With Plan yes    Additional Comments Received a call from pt insurance, [employer]. Spoke with Briana*(pt's CM at [place]- [phone], ext. [phone]) and per Briana request, gave pt contact number to call Briana. Per MD notes, pt not medically ready for discharge today. CM will cont to follow              Discharge Codes     None        Expected Discharge Date and Time     Expected Discharge Date Expected Discharge Time    Jan 22, 2018             Ashlee Hanson RN

## 2018-01-20 VITALS
DIASTOLIC BLOOD PRESSURE: 58 MMHG | HEIGHT: 68 IN | RESPIRATION RATE: 16 BRPM | OXYGEN SATURATION: 97 % | WEIGHT: 172.2 LBS | HEART RATE: 55 BPM | TEMPERATURE: 98.2 F | BODY MASS INDEX: 26.1 KG/M2 | SYSTOLIC BLOOD PRESSURE: 105 MMHG

## 2018-01-20 PROBLEM — D63.8 ANEMIA OF CHRONIC DISEASE: Status: ACTIVE | Noted: 2018-01-17

## 2018-01-20 PROBLEM — E61.1 IRON DEFICIENCY: Status: ACTIVE | Noted: 2018-01-20

## 2018-01-20 LAB
DEPRECATED RDW RBC AUTO: 46.1 FL (ref 37–54)
ERYTHROCYTE [DISTWIDTH] IN BLOOD BY AUTOMATED COUNT: 18.3 % (ref 11.3–14.5)
HCT VFR BLD AUTO: 24.3 % (ref 34.5–44)
HGB BLD-MCNC: 6.7 G/DL (ref 11.5–15.5)
MCH RBC QN AUTO: 19 PG (ref 27–31)
MCHC RBC AUTO-ENTMCNC: 27.6 G/DL (ref 32–36)
MCV RBC AUTO: 68.8 FL (ref 80–99)
PLATELET # BLD AUTO: 762 10*3/MM3 (ref 150–450)
PMV BLD AUTO: 8.2 FL (ref 6–12)
RBC # BLD AUTO: 3.53 10*6/MM3 (ref 3.89–5.14)
VIT B12 BLD-MCNC: 342 PG/ML (ref 211–911)
WBC NRBC COR # BLD: 8.77 10*3/MM3 (ref 3.5–10.8)

## 2018-01-20 PROCEDURE — 25010000002 METHYLPREDNISOLONE PER 40 MG: Performed by: PHYSICIAN ASSISTANT

## 2018-01-20 PROCEDURE — 25010000002 NA FERRIC GLUC CPLX PER 12.5 MG: Performed by: INTERNAL MEDICINE

## 2018-01-20 PROCEDURE — 25010000002 PIPERACILLIN SOD-TAZOBACTAM PER 1 G: Performed by: NURSE PRACTITIONER

## 2018-01-20 PROCEDURE — 99232 SBSQ HOSP IP/OBS MODERATE 35: CPT | Performed by: INTERNAL MEDICINE

## 2018-01-20 PROCEDURE — 82607 VITAMIN B-12: CPT | Performed by: INTERNAL MEDICINE

## 2018-01-20 PROCEDURE — 85027 COMPLETE CBC AUTOMATED: CPT | Performed by: INTERNAL MEDICINE

## 2018-01-20 PROCEDURE — 99239 HOSP IP/OBS DSCHRG MGMT >30: CPT | Performed by: INTERNAL MEDICINE

## 2018-01-20 PROCEDURE — 63710000001 AZATHIOPRINE PER 50 MG: Performed by: NURSE PRACTITIONER

## 2018-01-20 RX ORDER — PREDNISONE 20 MG/1
20 TABLET ORAL DAILY
Qty: 14 TABLET | Refills: 0 | Status: SHIPPED | OUTPATIENT
Start: 2018-01-20 | End: 2018-01-20

## 2018-01-20 RX ORDER — PREDNISONE 20 MG/1
20 TABLET ORAL
Qty: 28 TABLET | Refills: 0 | Status: SHIPPED | OUTPATIENT
Start: 2018-01-20 | End: 2018-02-21

## 2018-01-20 RX ADMIN — SODIUM CHLORIDE 125 MG: 9 INJECTION, SOLUTION INTRAVENOUS at 11:08

## 2018-01-20 RX ADMIN — METHYLPREDNISOLONE SODIUM SUCCINATE 20 MG: 40 INJECTION, POWDER, FOR SOLUTION INTRAMUSCULAR; INTRAVENOUS at 11:08

## 2018-01-20 RX ADMIN — AZATHIOPRINE 100 MG: 50 TABLET ORAL at 11:07

## 2018-01-20 RX ADMIN — TAZOBACTAM SODIUM AND PIPERACILLIN SODIUM 3.38 G: 375; 3 INJECTION, SOLUTION INTRAVENOUS at 00:25

## 2018-01-20 RX ADMIN — MESALAMINE 2.4 G: 1.2 TABLET, DELAYED RELEASE ORAL at 11:07

## 2018-01-20 RX ADMIN — RIFAXIMIN 200 MG: 200 TABLET ORAL at 11:07

## 2018-01-20 RX ADMIN — CETIRIZINE HYDROCHLORIDE 10 MG: 10 TABLET, FILM COATED ORAL at 11:07

## 2018-01-20 RX ADMIN — FAMOTIDINE 40 MG: 20 TABLET, FILM COATED ORAL at 11:07

## 2018-01-20 RX ADMIN — TAZOBACTAM SODIUM AND PIPERACILLIN SODIUM 3.38 G: 375; 3 INJECTION, SOLUTION INTRAVENOUS at 11:08

## 2018-01-20 NOTE — PROGRESS NOTES
"GI Daily Progress Note  Subjective     Sultana Siegel is a 37 y.o. female who was admitted with Exacerbation of Crohn's disease of large intestine. Feels better. Less pain. No other issues.  Wants to go home    Objective     /58 (BP Location: Right arm, Patient Position: Lying)  Pulse 55  Temp 98.2 °F (36.8 °C) (Oral)   Resp 16  Ht 172.7 cm (67.99\")  Wt 78.1 kg (172 lb 3.2 oz)  SpO2 97%  BMI 26.19 kg/m2    Intake/Output last 3 shifts:  I/O last 3 completed shifts:  In: 3261.1 [P.O.:720; I.V.:2391.1; IV Piggyback:150]  Out: -   Intake/Output this shift:  I/O this shift:  In: 310 [IV Piggyback:310]  Out: -       Physical Exam  Wt Readings from Last 3 Encounters:   01/18/18 78.1 kg (172 lb 3.2 oz)   01/17/18 76.7 kg (169 lb)   09/14/17 80.3 kg (177 lb)   ,body mass index is 26.19 kg/(m^2).,@FLOWAMB(6)@,@FLOWAMB(5)@,@FLOWAMB(8)@   CONSTITUTIONAL: lying in bed in no distress  Resp CTA; no rhonchi, rales, or wheezes.  Respiration effort normal  CV RRR; no M/R/G. No lower extremity edema  GI Abd soft, mild tender, ND, normal active bowel sounds.    Psych: Awake and alert    DATA  :AnsrADA pendingResults for SULTANA SIEGEL (MRN [id number]) as of 1/20/2018 12:50   Ref. Range 1/18/2018 05:42 1/18/2018 07:45 1/18/2018 17:16 1/20/2018 03:49   Hemoglobin Latest Ref Range: 11.5 - 15.5 g/dL 7.8 (L)   6.7 (L)   Hematocrit Latest Ref Range: 34.5 - 44.0 % 28.2 (L)   24.3 (L)       Assessment/Plan       Crohn's  SHAGGY    Ok of DC on pred 20mg bid.  FU with [facility] in 10-14 days. Further management based on antibodies and level.  Check B12.  She is to take otc calcium while on steroids. Aware of side effects.          Principal Problem:    Exacerbation of Crohn's disease of large intestine  Active Problems:    Abdominal pain    Immunocompromised    Anemia of chronic disease    Iron deficiency       LOS: 3 days     Oscar Alfaro MD  01/20/18  12:49 PM  "

## 2018-01-20 NOTE — PLAN OF CARE
Problem: Patient Care Overview (Adult)  Goal: Plan of Care Review  Outcome: Ongoing (interventions implemented as appropriate)   01/19/18 1821 01/19/18 2056 01/20/18 0424   Coping/Psychosocial Response Interventions   Plan Of Care Reviewed With --  patient --    Patient Care Overview   Progress no change --  --    Outcome Evaluation   Outcome Summary/Follow up Plan --  --  VSS, pt denies pain or discomfort. No further issues or concerns.       Problem: Pain, Acute (Adult)  Goal: Acceptable Pain Control/Comfort Level  Outcome: Ongoing (interventions implemented as appropriate)

## 2018-01-20 NOTE — DISCHARGE SUMMARY
[facility] Medicine Services  DISCHARGE SUMMARY    Patient Name: Sultana Siegel  : 1980  MRN: [id number]    Date of Admission: 2018  Date of Discharge:  18  Primary Care Physician: Lori Mcmahon MD    Consults     Date and Time Order Name Status Description    2018 1121 Inpatient Consult to Gastroenterology Completed         Hospital Course     Presenting Problem:   Crohn's disease of both small and large intestine without complication [K50.80]    Active Hospital Problems (** Indicates Principal Problem)    Diagnosis Date Noted   • **Exacerbation of Crohn's disease of large intestine [K50.10] 2016   • Iron deficiency [E61.1] 2018   • Anemia of chronic disease [D63.8] 2018   • Immunocompromised [D84.9] 2017   • Abdominal pain [R10.9] 2016      Resolved Hospital Problems    Diagnosis Date Noted Date Resolved   No resolved problems to display.          Hospital Course:  Sultana Siegel is a 37 y.o. female with a long-standing history of Crohn's disease who was sent to the hospital by her GI physician for an acute Crohn's exacerbation.  She has had increasing left-sided abdominal pain and intermittent diarrhea for the previous 3 weeks.  She is on chronic humira, Lialda, and Imuran.  A CT scan done on  showed significant transverse and descending colitis.  She was noted to the hospital and started on IV steroids.  She did experience much improvement with this and at the day of discharge is more or less back to her baseline.  She was followed by GI while inpatient and they recommended discharging her on 20 mg of prednisone twice daily until follow-up with Dr. Meier.  At the time of discharge Humira level and antibodies are pending along with thiopurine metabolites.  She is also found to be severely iron deficient and hemoglobin drifted down to 6.8 stable discharge, however not having any overt blood losses.  She received IV iron  while inpatient, however does not tolerate oral iron.  This will need be followed up as well with possible outpatient IV iron infusions to be arranged.  Plan is to discharge home later on today and follow-up with Dr. Meier in approximately 10 days.  Case was discussed with Dr. Bowers from GI and the patient and all are agreeable with plan.           Day of Discharge     HPI:   Feeling better today.  No further diarrhea or abd pain.  Tolerating diet.  Back to baseline more or less.  Wants to go home.    Review of Systems  Gen- No fevers, chills  CV- No chest pain, palpitations  Resp- No cough, dyspnea  GI- No N/V/D, abd pain    Otherwise ROS is negative except as mentioned in the HPI.    Vital Signs:   Temp:  [98 °F (36.7 °C)-98.2 °F (36.8 °C)] 98.2 °F (36.8 °C)  Heart Rate:  [55] 55  Resp:  [16-18] 16  BP: (105-117)/(58-76) 105/58     Physical Exam:  Constitutional: No acute distress, awake, alert  HENT: NCAT, mucous membranes moist  Respiratory: Clear to auscultation bilaterally, respiratory effort normal   Cardiovascular: RRR, no murmurs, rubs, or gallops   Gastrointestinal: Positive bowel sounds, soft, slight left sided abd pain, nondistended  Musculoskeletal: No bilateral ankle edema  Psychiatric: Appropriate affect, cooperative  Neurologic: Oriented x 3, strength symmetric in all extremities, Cranial Nerves grossly intact to confrontation, speech clear  Skin: No rashes      Pertinent  and/or Most Recent Results       Results from last 7 days  Lab Units 01/20/18  0349 01/18/18  0542 01/17/18  1543   WBC 10*3/mm3 8.77 6.41 9.63   HEMOGLOBIN g/dL 6.7* 7.8* 7.5*   HEMATOCRIT % 24.3* 28.2* 26.6*   PLATELETS 10*3/mm3 762* 768* 808*   SODIUM mmol/L  --  136 133   POTASSIUM mmol/L  --  4.5 3.7   CHLORIDE mmol/L  --  108 101   CO2 mmol/L  --  22.0 20.0   BUN mg/dL  --  11 11   CREATININE mg/dL  --  0.80 0.90   GLUCOSE mg/dL  --  166* 92   CALCIUM mg/dL  --  8.9 9.1       Results from last 7 days  Lab Units 01/17/18  1549    BILIRUBIN mg/dL 0.3   ALK PHOS U/L 85   ALT (SGPT) U/L 6*   AST (SGOT) U/L 9           Invalid input(s): TG, LDLREALC      Brief Urine Lab Results  (Last result in the past 365 days)      Color   Clarity   Blood   Leuk Est   Nitrite   Protein   CREAT   Urine HCG        01/18/18 0745 Dark Yellow(A) Cloudy(A) Moderate (2+)(A) Negative Negative 30 mg/dL (1+)(A)               Microbiology Results Abnormal     Procedure Component Value - Date/Time    Urine Culture - Urine, Urine, Clean Catch [[id number]] Collected:  01/17/18 1543    Lab Status:  Final result Specimen:  Urine from Urine, Clean Catch Updated:  01/19/18 0920     Urine Culture --      50,000-60,000 CFU/mL Normal Urogenital Shante    Clostridium Difficile Toxin - Stool, Per Rectum [[id number]] Collected:  01/18/18 1716    Lab Status:  Final result Specimen:  Stool from Per Rectum Updated:  01/18/18 1821    Narrative:       The following orders were created for panel order Clostridium Difficile Toxin - Stool, Per Rectum.  Procedure                               Abnormality         Status                     ---------                               -----------         ------                     Clostridium Difficile To...[[id number]]  Normal              Final result                 Please view results for these tests on the individual orders.    Clostridium Difficile Toxin, PCR - Stool, Per Rectum [[id number]]  (Normal) Collected:  01/18/18 1716    Lab Status:  Final result Specimen:  Stool from Per Rectum Updated:  01/18/18 1821     C. Difficile Toxins by PCR Not Detected    Narrative:         Performance characteristics of test not established for patients <2 years of age.  Negative for Toxigenic C. Difficile          Imaging Results (all)     None                Order Current Status    Adalimumab+Ab (Serial Monitor) In process    Calprotectin, Fecal - Stool, Per Rectum In process    Histoplasma Ag Ur - Urine, Clean Catch In process    Thiopurine Metabolites In  process        Discharge Details      RobbinSultana   Home Medication Instructions AMOR:[id number]    Printed on:01/20/18 8214   Medication Information                      adalimumab (HUMIRA) 40 MG/0.8ML Prefilled Syringe Kit injection  Inject 40 mg under the skin Every 14 (Fourteen) Days. SUNDAYS             azaTHIOprine (IMURAN) 50 MG tablet  Take 2 tablets by mouth Daily.             levocetirizine (XYZAL) 5 MG tablet  Take 5 mg by mouth Every Evening.             mesalamine (LIALDA) 1.2 g EC tablet  Take 2,400 mg by mouth Daily With Breakfast.             predniSONE (DELTASONE) 20 MG tablet  Take 1 tablet by mouth 2 (Two) Times a Day.             rifAXIMin (XIFAXAN) 200 MG tablet  Take 200 mg by mouth 3 (Three) Times a Day.                   Discharge Disposition:  Home or Self Care    Discharge Diet:  Diet Instructions     Advance Diet As Tolerated                     Discharge Activity:   Activity Instructions     Activity as Tolerated                     No future appointments.    Additional Instructions for the Follow-ups that You Need to Schedule     Discharge Follow-up with Specialty: Dr. Meier in ~10 days    As directed    Specialty:  Dr. Meier in ~10 days                     Time Spent on Discharge:  35 minutes    Haroon Maddox MD  01/20/18  12:56 PM

## 2018-01-22 LAB
H CAPSUL AG UR-MCNC: <0.5 NG/ML
Lab: NORMAL

## 2018-01-22 NOTE — PAYOR COMM NOTE
"Jackson Siegel (37 y.o. Female)     Date of Birth Social Security Number Address Home Phone MRN    1980  [address] [address] [phone] [id number]    Mandaen Marital Status          None Single       Admission Date Admission Type Admitting Provider Attending Provider Department, Room/Bed    18 Emergency Haroon Maddox MD  [address], S569/1    Discharge Date Discharge Disposition Discharge Destination        2018 Home or Self Care             Attending Provider: (none)    Allergies:  No Known Allergies    Isolation:  None   Infection:  None   Code Status:  Prior    Ht:  172.7 cm (67.99\")   Wt:  78.1 kg (172 lb 3.2 oz)    Admission Cmt:  None   Principal Problem:  Exacerbation of Crohn's disease of large intestine [K50.10]                 Active Insurance as of 2018     Primary Coverage     Payor Plan Insurance Group Employer/Plan Group    [facility] [employer] [id number]     Payor Plan Address Payor Plan Phone Number Effective From Effective To    [address] [phone] 10/1/2015     [place] [address]       Subscriber Name Subscriber Birth Date Member ID       JACKSON SIEGEL 1980 [id number]                 Emergency Contacts      (Rel.) Home Phone Work Phone Mobile Phone    Han Siegel (Father) [phone] -- --               Discharge Summary      Haroon Maddox MD at 2018 12:56 PM              [facility] Medicine Services  DISCHARGE SUMMARY    Patient Name: Jackson Siegel  : 1980  MRN: [id number]    Date of Admission: 2018  Date of Discharge:  18  Primary Care Physician: Lori Mcmahon MD    Consults     Date and Time Order Name Status Description    2018 1121 Inpatient Consult to Gastroenterology Completed         Hospital Course     Presenting Problem:   Crohn's disease of both small and large intestine without " complication [K50.80]    Active Hospital Problems (** Indicates Principal Problem)    Diagnosis Date Noted   • **Exacerbation of Crohn's disease of large intestine [K50.10] 06/22/2016   • Iron deficiency [E61.1] 01/20/2018   • Anemia of chronic disease [D63.8] 01/17/2018   • Immunocompromised [D84.9] 09/14/2017   • Abdominal pain [R10.9] 06/22/2016      Resolved Hospital Problems    Diagnosis Date Noted Date Resolved   No resolved problems to display.          Hospital Course:  Sultana Siegel is a 37 y.o. female with a long-standing history of Crohn's disease who was sent to the hospital by her GI physician for an acute Crohn's exacerbation.  She has had increasing left-sided abdominal pain and intermittent diarrhea for the previous 3 weeks.  She is on chronic humira, Lialda, and Imuran.  A CT scan done on January 9 showed significant transverse and descending colitis.  She was noted to the hospital and started on IV steroids.  She did experience much improvement with this and at the day of discharge is more or less back to her baseline.  She was followed by GI while inpatient and they recommended discharging her on 20 mg of prednisone twice daily until follow-up with Dr. Meier.  At the time of discharge Humira level and antibodies are pending along with thiopurine metabolites.  She is also found to be severely iron deficient and hemoglobin drifted down to 6.8 stable discharge, however not having any overt blood losses.  She received IV iron while inpatient, however does not tolerate oral iron.  This will need be followed up as well with possible outpatient IV iron infusions to be arranged.  Plan is to discharge home later on today and follow-up with Dr. Meier in approximately 10 days.  Case was discussed with Dr. Bowers from GI and the patient and all are agreeable with plan.           Day of Discharge     HPI:   Feeling better today.  No further diarrhea or abd pain.  Tolerating diet.  Back to baseline more or less.   Wants to go home.    Review of Systems  Gen- No fevers, chills  CV- No chest pain, palpitations  Resp- No cough, dyspnea  GI- No N/V/D, abd pain    Otherwise ROS is negative except as mentioned in the HPI.    Vital Signs:   Temp:  [98 °F (36.7 °C)-98.2 °F (36.8 °C)] 98.2 °F (36.8 °C)  Heart Rate:  [55] 55  Resp:  [16-18] 16  BP: (105-117)/(58-76) 105/58     Physical Exam:  Constitutional: No acute distress, awake, alert  HENT: NCAT, mucous membranes moist  Respiratory: Clear to auscultation bilaterally, respiratory effort normal   Cardiovascular: RRR, no murmurs, rubs, or gallops   Gastrointestinal: Positive bowel sounds, soft, slight left sided abd pain, nondistended  Musculoskeletal: No bilateral ankle edema  Psychiatric: Appropriate affect, cooperative  Neurologic: Oriented x 3, strength symmetric in all extremities, Cranial Nerves grossly intact to confrontation, speech clear  Skin: No rashes      Pertinent  and/or Most Recent Results       Results from last 7 days  Lab Units 01/20/18  0349 01/18/18  0542 01/17/18  1543   WBC 10*3/mm3 8.77 6.41 9.63   HEMOGLOBIN g/dL 6.7* 7.8* 7.5*   HEMATOCRIT % 24.3* 28.2* 26.6*   PLATELETS 10*3/mm3 762* 768* 808*   SODIUM mmol/L  --  136 133   POTASSIUM mmol/L  --  4.5 3.7   CHLORIDE mmol/L  --  108 101   CO2 mmol/L  --  22.0 20.0   BUN mg/dL  --  11 11   CREATININE mg/dL  --  0.80 0.90   GLUCOSE mg/dL  --  166* 92   CALCIUM mg/dL  --  8.9 9.1       Results from last 7 days  Lab Units 01/17/18  1543   BILIRUBIN mg/dL 0.3   ALK PHOS U/L 85   ALT (SGPT) U/L 6*   AST (SGOT) U/L 9           Invalid input(s): TG, LDLREALC      Brief Urine Lab Results  (Last result in the past 365 days)      Color   Clarity   Blood   Leuk Est   Nitrite   Protein   CREAT   Urine HCG        01/18/18 0745 Dark Yellow(A) Cloudy(A) Moderate (2+)(A) Negative Negative 30 mg/dL (1+)(A)               Microbiology Results Abnormal     Procedure Component Value - Date/Time    Urine Culture - Urine, Urine,  Clean Catch [[id number]] Collected:  01/17/18 1543    Lab Status:  Final result Specimen:  Urine from Urine, Clean Catch Updated:  01/19/18 0920     Urine Culture --      50,000-60,000 CFU/mL Normal Urogenital Shante    Clostridium Difficile Toxin - Stool, Per Rectum [[id number]] Collected:  01/18/18 1716    Lab Status:  Final result Specimen:  Stool from Per Rectum Updated:  01/18/18 1821    Narrative:       The following orders were created for panel order Clostridium Difficile Toxin - Stool, Per Rectum.  Procedure                               Abnormality         Status                     ---------                               -----------         ------                     Clostridium Difficile To...[[id number]]  Normal              Final result                 Please view results for these tests on the individual orders.    Clostridium Difficile Toxin, PCR - Stool, Per Rectum [[id number]]  (Normal) Collected:  01/18/18 1716    Lab Status:  Final result Specimen:  Stool from Per Rectum Updated:  01/18/18 1821     C. Difficile Toxins by PCR Not Detected    Narrative:         Performance characteristics of test not established for patients <2 years of age.  Negative for Toxigenic C. Difficile          Imaging Results (all)     None                Order Current Status    Adalimumab+Ab (Serial Monitor) In process    Calprotectin, Fecal - Stool, Per Rectum In process    Histoplasma Ag Ur - Urine, Clean Catch In process    Thiopurine Metabolites In process        Discharge Details      Sultana Siegel   Home Medication Instructions AMOR:[id number]    Printed on:01/20/18 9671   Medication Information                      adalimumab (HUMIRA) 40 MG/0.8ML Prefilled Syringe Kit injection  Inject 40 mg under the skin Every 14 (Fourteen) Days. SUNDAYS             azaTHIOprine (IMURAN) 50 MG tablet  Take 2 tablets by mouth Daily.             levocetirizine (XYZAL) 5 MG tablet  Take 5 mg by mouth Every Evening.              mesalamine (LIALDA) 1.2 g EC tablet  Take 2,400 mg by mouth Daily With Breakfast.             predniSONE (DELTASONE) 20 MG tablet  Take 1 tablet by mouth 2 (Two) Times a Day.             rifAXIMin (XIFAXAN) 200 MG tablet  Take 200 mg by mouth 3 (Three) Times a Day.                   Discharge Disposition:  Home or Self Care    Discharge Diet:  Diet Instructions     Advance Diet As Tolerated                     Discharge Activity:   Activity Instructions     Activity as Tolerated                     No future appointments.    Additional Instructions for the Follow-ups that You Need to Schedule     Discharge Follow-up with Specialty: Dr. Meier in ~10 days    As directed    Specialty:  Dr. Meier in ~10 days                     Time Spent on Discharge:  35 minutes    Haroon Maddox MD  01/20/18  12:56 PM         Electronically signed by Haroon Maddox MD at 1/20/2018  1:02 PM

## 2018-01-24 LAB
ADALIMUMAB AB SERPL-MCNC: <25 NG/ML
ADALIMUMAB SERPL-MCNC: 2.7 UG/ML

## 2018-01-25 LAB
6-TGN ENTSUB RBC: 176 PMOL/8X 10E8
6MMP ENTSUB RBC: 372 PMOL/8X 10E8

## 2018-01-29 ENCOUNTER — OFFICE VISIT (OUTPATIENT)
Dept: GASTROENTEROLOGY | Facility: CLINIC | Age: 38
End: 2018-01-29

## 2018-01-29 ENCOUNTER — LAB (OUTPATIENT)
Dept: LAB | Facility: HOSPITAL | Age: 38
End: 2018-01-29

## 2018-01-29 VITALS
HEART RATE: 64 BPM | TEMPERATURE: 97.2 F | DIASTOLIC BLOOD PRESSURE: 76 MMHG | WEIGHT: 169 LBS | SYSTOLIC BLOOD PRESSURE: 110 MMHG | HEIGHT: 68 IN | OXYGEN SATURATION: 97 % | BODY MASS INDEX: 25.61 KG/M2

## 2018-01-29 DIAGNOSIS — K50.119 CROHN'S DISEASE OF LARGE INTESTINE WITH COMPLICATION (HCC): ICD-10-CM

## 2018-01-29 DIAGNOSIS — Z79.52 LONG TERM SYSTEMIC STEROID USER: ICD-10-CM

## 2018-01-29 DIAGNOSIS — K50.119 CROHN'S DISEASE OF LARGE INTESTINE WITH COMPLICATION (HCC): Primary | ICD-10-CM

## 2018-01-29 DIAGNOSIS — D64.9 ANEMIA, UNSPECIFIED TYPE: ICD-10-CM

## 2018-01-29 LAB
BASOPHILS # BLD AUTO: 0 10*3/MM3 (ref 0–0.2)
BASOPHILS NFR BLD AUTO: 0 % (ref 0–1)
CALPROTECTIN STL-MCNT: 1030 UG/G (ref 0–120)
CRP SERPL-MCNC: 3.72 MG/DL (ref 0–1)
DEPRECATED RDW RBC AUTO: 45.9 FL (ref 37–54)
EOSINOPHIL # BLD AUTO: 0.01 10*3/MM3 (ref 0–0.3)
EOSINOPHIL NFR BLD AUTO: 0.1 % (ref 0–3)
ERYTHROCYTE [DISTWIDTH] IN BLOOD BY AUTOMATED COUNT: 23.4 % (ref 11.3–14.5)
HAV IGM SERPL QL IA: NORMAL
HBV CORE IGM SERPL QL IA: NORMAL
HBV SURFACE AG SERPL QL IA: NORMAL
HCT VFR BLD AUTO: 34.4 % (ref 34.5–44)
HCV AB SER DONR QL: NORMAL
HGB BLD-MCNC: 9.6 G/DL (ref 11.5–15.5)
IMM GRANULOCYTES # BLD: 0.04 10*3/MM3 (ref 0–0.03)
IMM GRANULOCYTES NFR BLD: 0.4 % (ref 0–0.6)
LYMPHOCYTES # BLD AUTO: 0.86 10*3/MM3 (ref 0.6–4.8)
LYMPHOCYTES NFR BLD AUTO: 9.3 % (ref 24–44)
MCH RBC QN AUTO: 19.8 PG (ref 27–31)
MCHC RBC AUTO-ENTMCNC: 27.9 G/DL (ref 32–36)
MCV RBC AUTO: 71.1 FL (ref 80–99)
MONOCYTES # BLD AUTO: 0.58 10*3/MM3 (ref 0–1)
MONOCYTES NFR BLD AUTO: 6.3 % (ref 0–12)
NEUTROPHILS # BLD AUTO: 7.77 10*3/MM3 (ref 1.5–8.3)
NEUTROPHILS NFR BLD AUTO: 83.9 % (ref 41–71)
PLATELET # BLD AUTO: 700 10*3/MM3 (ref 150–450)
PMV BLD AUTO: 8.2 FL (ref 6–12)
RBC # BLD AUTO: 4.84 10*6/MM3 (ref 3.89–5.14)
WBC NRBC COR # BLD: 9.26 10*3/MM3 (ref 3.5–10.8)

## 2018-01-29 PROCEDURE — 99214 OFFICE O/P EST MOD 30 MIN: CPT | Performed by: NURSE PRACTITIONER

## 2018-01-29 PROCEDURE — 86140 C-REACTIVE PROTEIN: CPT

## 2018-01-29 PROCEDURE — 80074 ACUTE HEPATITIS PANEL: CPT

## 2018-01-29 PROCEDURE — 85025 COMPLETE CBC W/AUTO DIFF WBC: CPT

## 2018-01-29 PROCEDURE — 86480 TB TEST CELL IMMUN MEASURE: CPT

## 2018-01-29 PROCEDURE — 96372 THER/PROPH/DIAG INJ SC/IM: CPT | Performed by: NURSE PRACTITIONER

## 2018-01-29 PROCEDURE — 36415 COLL VENOUS BLD VENIPUNCTURE: CPT

## 2018-01-29 RX ORDER — CYANOCOBALAMIN 1000 UG/ML
1000 INJECTION, SOLUTION INTRAMUSCULAR; SUBCUTANEOUS ONCE
Status: DISCONTINUED | OUTPATIENT
Start: 2018-01-29 | End: 2018-03-31 | Stop reason: HOSPADM

## 2018-01-29 NOTE — PATIENT INSTRUCTIONS
Cyanocobalamin, Vitamin B12 injection  What is this medicine?  CYANOCOBALAMIN (sye an oh koe BAL a min) is a man made form of vitamin B12. Vitamin B12 is used in the growth of healthy blood cells, nerve cells, and proteins in the body. It also helps with the metabolism of fats and carbohydrates. This medicine is used to treat people who can not absorb vitamin B12.  This medicine may be used for other purposes; ask your health care provider or pharmacist if you have questions.  COMMON BRAND NAME(S): B-12 Compliance Kit, B-12 Injection Kit, Cyomin, LA-12, Nutri-Twelve, Physicians EZ Use B-12, Primabalt  What should I tell my health care provider before I take this medicine?  They need to know if you have any of these conditions:  -kidney disease  -Rudi's disease  -megaloblastic anemia  -an unusual or allergic reaction to cyanocobalamin, cobalt, other medicines, foods, dyes, or preservatives  -pregnant or trying to get pregnant  -breast-feeding  How should I use this medicine?  This medicine is injected into a muscle or deeply under the skin. It is usually given by a health care professional in a clinic or doctor's office. However, your doctor may teach you how to inject yourself. Follow all instructions.  Talk to your pediatrician regarding the use of this medicine in children. Special care may be needed.  Overdosage: If you think you have taken too much of this medicine contact a poison control center or emergency room at once.  NOTE: This medicine is only for you. Do not share this medicine with others.  What if I miss a dose?  If you are given your dose at a clinic or doctor's office, call to reschedule your appointment. If you give your own injections and you miss a dose, take it as soon as you can. If it is almost time for your next dose, take only that dose. Do not take double or extra doses.  What may interact with this medicine?  -colchicine  -heavy alcohol intake  This list may not describe all possible  interactions. Give your health care provider a list of all the medicines, herbs, non-prescription drugs, or dietary supplements you use. Also tell them if you smoke, drink alcohol, or use illegal drugs. Some items may interact with your medicine.  What should I watch for while using this medicine?  Visit your doctor or health care professional regularly. You may need blood work done while you are taking this medicine.  You may need to follow a special diet. Talk to your doctor. Limit your alcohol intake and avoid smoking to get the best benefit.  What side effects may I notice from receiving this medicine?  Side effects that you should report to your doctor or health care professional as soon as possible:  -allergic reactions like skin rash, itching or hives, swelling of the face, lips, or tongue  -blue tint to skin  -chest tightness, pain  -difficulty breathing, wheezing  -dizziness  -red, swollen painful area on the leg  Side effects that usually do not require medical attention (report to your doctor or health care professional if they continue or are bothersome):  -diarrhea  -headache  This list may not describe all possible side effects. Call your doctor for medical advice about side effects. You may report side effects to FDA at 9-203-FDA-4300.  Where should I keep my medicine?  Keep out of the reach of children.  Store at room temperature between 15 and 30 degrees C (59 and 85 degrees F). Protect from light. Throw away any unused medicine after the expiration date.  NOTE: This sheet is a summary. It may not cover all possible information. If you have questions about this medicine, talk to your doctor, pharmacist, or health care provider.  © 2018 [employer]/Gold Standard (2009-03-30 22:10:20)

## 2018-01-29 NOTE — PROGRESS NOTES
GASTROENTEROLOGY OUTPATIENT ESTABLISHED PATIENT NOTE  Patient: JACKSON QUESADA : 1980  Date of Service: 2018  CC: Follow-up (Crohn's Flare)    Assessment/Plan                                             ASSESSMENT & PLANS     Crohn's disease of large intestine with complication  Anemia, unspecified type d/t Crohn's flare   -     CBC. CRP. Hepatitis Panel, Acute  since I don't see Hep B lab info in our electronic medical records and Quanti[employer]ON TB Gold. Prior TB testing was skin testing  -     Administer cyanocobalamin injection 1,000 mcg; Inject 1 mL into the shoulder, thigh, or buttocks 1 (One) Time x 1 today in clinic    · Increase Humira to 40 mg SQ once wkly instead of every 14 days, starting as soon as insurance permits.  · Continue Imuran and Lialda at current dose  · Consider Entyvio or Remicade if pt develops antibodies to Humira and/or Humira dose increase is not effective in controlling pt's Crohn's flare.  Perhaps, IV biologic is a good alternative to ensure compliance.   · Start Prednisone taper 30 mg once daily x 1 wk  · Decrease Prednisone taper 20 mg once daily x 1 wk  · Decrease Prednisone taper 10 mg once daily x 1 wk    Long term systemic steroid/immunosuppresant user  · Pt is counseled on letting us know if pt develops a fever, burning when you pass your urine, a cold or cough that will not go away, or body aches/flu-like symptoms.  · Pt is encouraged to take two OsCal-Vit D once daily for bone strengthening  · Dermatology consult d/t being on Immuran long term.  Pt states that she is currently seeing Dr Karen Upton from [facility].  I ask pt to let her dermatologist know that she is on Imuran     Follow Up:Return in about 2 months (around 3/29/2018) for Recheck.      DISCUSSION: I discussed pt's case w/ Dr Meier, and we collaborate above plan. Since Dr Meier will be retiring soon, from this point forward, future endoscopic procedures and Crohn's mgt will be transferred to  Oracio Alexandra at our practice, along with myself. The above plan was delineated in details with patient and all questions and concerns were answered.  Patient is also given contact information.  Patient is to return as scheduled or sooner if new problems arise.   Subjective                                                     SUBJECTIVE   History of Present Illness  Ms. Sultana Siegel is a 37 y.o. female well-known patient Dr. Anna.  She has a long-standing history of Crohn's disease since 2001, involving cecum, ascending colon, transverse colon and terminal ileum  Patient was recently hospitalized 10 days ago for Crohn's flare. She presented with increasing left-sided abdominal pain and intermittent diarrhea for 3 weeks.  CT scan abdomen and pelvis with contrast, done on 1/9/18, prior to recent hospitalization, showed significant transverse and descending colitis, but no abscess or fistula.  Pt saw Asher Handy  (prior to seeing Dr Meier and hospitalization).  Dr Skaggs recommended surgery, but pt defers and wanted to see if she can be tx medically first. During hospitalization, patient was treated with IV steroids then oral steroids. Pt was also  severely anemic with H&H 6.7/24.3.  Patient did not have any gross evidence of bleeding and did not require blood transfusion.  Patient was treated with IV iron    Pt was tx initially w/ Remicade for about 4 years with clinical remission, but stopped due to sustained remission.  Hx of anal fissure r/t Crohn's flare about 7 yrs ago. She was also on Humira, Imuran, and Lialda for some time.  Pt went off Imuran for 2 years d/t hair loss.  Reports of being Humira for 7 yrs since 2011.  There is also intermittent gaps of therapy w/ Humira and Lialda d/t compliance and/or insurance issue as well. Pt recently flared despite being on Humira, Imuran, and Lialda.  Pt reports that Dr Meier recently mentioned Entyvio if pt flares on Humira.  Last colonoscopy was in 2015.     Patient  presents to clinic today for post hospital discharge follow-up.  Patient is currently taking prednisone 20 mg twice daily.  Patient reports of being complying with Imuran and Lialda.  Her last Humira dose was 1/14/18. On 1/18/18, Humira drug level was at 2.7 and antibody is undetected.  6 MMPN level at 372.  6-TGNlevel at 176    Diarrhea and abd pain/cramping have improved. BM now once daily w/ soft stools. Pt denies dark black stools or bright red blood in the stools, in the toilet bowl, or on the toilet tissue.Pt denies hematemesis, rectal bleeding, melena, gum bleed, hemoptysis, or gross hematuria.  Pt denies SOB, chest pain, dizziness, vertigo, syncope, fall, or near fall.  Weight is stable.Pt denies fever, chills, night sweats, or unintentional weight loss.    ROS:Review of Systems   Constitutional: Positive for activity change.        Pt currently or recently takes NSAIDS ( (i.e Ibuprofen, Aleve, Advil, Exedrin, BC Powder, diclofenac, meloxicam, & Naproxen, etc)? No  Pt currently or recently takes abx? Yes   HENT: Negative.    Eyes: Negative.    Respiratory: Negative.    Cardiovascular: Negative.    Gastrointestinal: Positive for abdominal distention (bloating) and abdominal pain.   Endocrine: Negative.    Genitourinary: Negative.    Musculoskeletal: Negative.    Skin: Negative.    Allergic/Immunologic: Negative.    Neurological: Negative.    Hematological: Negative.    Psychiatric/Behavioral: Negative.    Subjective   PAST MED HX: Pt  has a past medical history of Crohn disease.  PAST SURG HX: Pt  has no past surgical history on file.  FAM HX: family history includes GI problems in her brother; No Known Problems in her father, maternal grandfather, maternal grandmother, mother, paternal grandfather, and paternal grandmother.  SOC HX: Pt  reports that she has never smoked. She has never used smokeless tobacco. She reports that she does not drink alcohol or use illicit drugs.    Objective                                                            OBJECTIVE   Allergy: Pt has No Known Allergies.  MEDS: •  adalimumab (HUMIRA) 40 MG/0.8ML Prefilled Syringe Kit injection, Inject 40 mg under the skin Every 14 (Fourteen) Days. SUNDAYS, Disp: , Rfl:   •  azaTHIOprine (IMURAN) 50 MG tablet, Take 2 tablets by mouth Daily., Disp: 60 tablet, Rfl: 11  •  levocetirizine (XYZAL) 5 MG tablet, Take 5 mg by mouth Every Evening., Disp: , Rfl:   •  mesalamine (LIALDA) 1.2 g EC tablet, Take 2,400 mg by mouth Daily With Breakfast., Disp: , Rfl:   •  predniSONE (DELTASONE) 20 MG tablet, Take 1 tablet by mouth 2 (Two) Times a Day., Disp: 28 tablet, Rfl: 0    EXAMINATION: CT ABDOMEN PELVIS W IV CONTRAST- 1/9/18      INDICATION: LUQ PAIN, CROHN'S; K50.819-Crohn's disease of both small and  large intestine with unspecified complications; R10.12-Left upper  quadrant pain       TECHNIQUE: CT abdomen and pelvis with intravenous contrast  administration       FINDINGS: Lung bases demonstrates subsegmental atelectasis and/or  scarring. Liver without focal lesion. Gallbladder unremarkable. Pancreas  without suspicious lesion. Spleen is normal. Adrenals without distinct  nodule. Kidneys without hydronephrosis or hydroureter. No bulky  retroperitoneal adenopathy.      GI tract evaluation demonstrates pericolonic inflammatory stranding  along with mural thickening and mucosal hyperenhancement of the distal  transverse colon through the level of the sigmoid colon greatest  involvement in the mid descending colon however no associated abscess  formation or evidence of fistulization. Trace mesenteric edema without  significant free fluid or intraperitoneal free air. Pelvic viscera  unremarkable with calcified phleboliths and trace free fluid within  normal limits for premenopausal female however no bulky adenopathy. No  aggressive osseous or soft tissue body wall findings.      IMPRESSION:  Transmural inflammation and thickening with mucosal  hyperenhancement  and pericolonic stranding noted of the transverse colon  through descending colon of greatest involvement within the mid  descending colon with mesenteric edema however no abscess formation or  associated fistulization identified consistent with colitis of  infectious or inflammatory etiology.    Per outside medical records from [facility] 1/17/18. Stool negative for CDiff    Lab Results   Component Value Date     01/18/2018    K 4.5 01/18/2018     01/18/2018    CO2 22.0 01/18/2018    BUN 11 01/18/2018    CREATININE 0.80 01/18/2018    GLUCOSE 166 (H) 01/18/2018    CALCIUM 8.9 01/18/2018    ANIONGAP 6.0 01/18/2018     Lab Results   Component Value Date    AST 9 01/17/2018    AST 14 09/14/2017    AST 13 06/22/2016    ALT 6 (L) 01/17/2018    ALT 11 09/14/2017    ALT 10 06/22/2016    ALKPHOS 85 01/17/2018    ALKPHOS 87 09/14/2017    ALKPHOS 56 06/22/2016    BILITOT 0.3 01/17/2018    ALBUMIN 3.70 01/17/2018    LIPASE 26 01/17/2018        Anemia Profile     Lab Results   Component Value Date    HGB 6.7 (L) 01/20/2018    HGB 7.8 (L) 01/18/2018    HGB 7.5 (L) 01/17/2018    HGB 9.2 (L) 09/14/2017    HGB 7.7 (L) 06/22/2016     Lab Results   Component Value Date    HCT 24.3 (L) 01/20/2018    HCT 28.2 (L) 01/18/2018    HCT 26.6 (L) 01/17/2018    HCT 32.9 (L) 09/14/2017    HCT 26.4 (L) 06/22/2016     Lab Results   Component Value Date    MCV 68.8 (L) 01/20/2018    MCV 68.1 (L) 01/18/2018    MCV 66.0 (L) 01/17/2018    MCHC 27.6 (L) 01/20/2018    MCHC 27.7 (L) 01/18/2018    MCHC 28.2 (L) 01/17/2018    RDW 18.3 (H) 01/20/2018    RDW 17.6 (H) 01/18/2018    RDW 17.4 (H) 01/17/2018     Lab Results   Component Value Date    WBC 8.77 01/20/2018    WBC 6.41 01/18/2018    WBC 9.63 01/17/2018     (H) 01/20/2018     (H) 01/18/2018     (H) 01/17/2018     BUN Creatine Ratio (Normal 7.0 - 25.0)  Lab Results   Component Value Date    BCR 13.8 01/18/2018    BCR 12.2 01/17/2018     TIBC (Normal 250 - 450 ug/dL)  Lab  Results   Component Value Date    TIBC 343 01/17/2018     Serum Iron (Normal 38 - 169 ug/dL)  Lab Results   Component Value Date    IRON 4 (L) 01/17/2018     Transferrin or Iron Saturation % (Normal 20 - 50 %)  Lab Results   Component Value Date    LABIRON 1 (L) 01/17/2018     Ferritin (Normal 20.00 - 291.00 ng/mL)  Lab Results   Component Value Date    FERRITIN 34.00 01/17/2018     Reticulocytes (Normal 0.50 - 1.50 %)  Lab Results   Component Value Date    RETICCTPCT 0.91 01/17/2018      Erythrocyte Sedimentation Rate (ESR) (Normal 0 - 20 mm/hr)  Lab Results   Component Value Date    SEDRATE 105 (H) 01/18/2018     CRP (Normal 0.00 - 1.0 mg/dL or  0.00 - 10.0 mg/L)   Lab Results   Component Value Date    CRP 19.86 (H) 01/17/2018    CRP 8.11 (H) 09/14/2017    CRP 40.10 (H) 06/22/2016     Thyroid/Calcium Panel (Normal TSH: 0.350 - 5.350 mIU/mL)   Lab Results   Component Value Date    CALCIUM 8.9 01/18/2018    CALCIUM 9.1 01/17/2018    CALCIUM 8.5 (L) 09/14/2017     Scan Slide (Normal: None Seen)  Lab Results   Component Value Date    HYPOCHROM Mod/2+ 06/22/2016    MICROCYTES Mod/2+ 06/22/2016    WBCMORPH Normal 06/22/2016     Vitamin B 12 (Normal 211 - 946 pg/mL)  Lab Results   Component Value Date    TTIBPHGZ56 342 01/20/2018       Wt Readings from Last 5 Encounters:   01/29/18 76.7 kg (169 lb)   01/18/18 78.1 kg (172 lb 3.2 oz)   01/17/18 76.7 kg (169 lb)   09/14/17 80.3 kg (177 lb)   06/22/16 75.9 kg (167 lb 4 oz)   body mass index is 25.7 kg/(m^2).,Temp: 97.2 °F (36.2 °C),BP: 110/76,Heart Rate: 64   Physical Exam  General Well developed; well nourished; no acute distress.   ENT Oral mucosa pink and moist without thrush or lesions.    GI Abd soft, slightly tender to left lower quadrant, but no guarding or rebound, ND, normal active bowel sounds.  No abd hernia    Pt care team: Patito DRAKE & ZOILA Bridges  [facility]--Gastroenterology  [phone]    CC: Dr.Susan Jones  MD Lisandro   [address] [place] / [place] [address] FAX:[phone]

## 2018-02-01 LAB
INTERPRETATION: NORMAL
M TB TUBERC IFN-G BLD QL: NEGATIVE
QFT TB AG MINUS NIL VALUE: <0 IU/ML
QUANTIFERON CRITERIA: NORMAL
QUANTIFERON MITOGEN VALUE: >10 IU/ML
QUANTIFERON NIL VALUE: 0.03 IU/ML
QUANTIFERON TB AG VALUE: 0.02 IU/ML

## 2018-02-06 ENCOUNTER — TELEPHONE (OUTPATIENT)
Dept: GASTROENTEROLOGY | Facility: CLINIC | Age: 38
End: 2018-02-06

## 2018-02-06 DIAGNOSIS — K50.119 CROHN'S DISEASE OF LARGE INTESTINE WITH COMPLICATION (HCC): Primary | ICD-10-CM

## 2018-02-06 NOTE — TELEPHONE ENCOUNTER
Patito,  Can you resend Humira Pens script to [facility] mail order. Not the syringes per patient? Thanks

## 2018-02-14 ENCOUNTER — TELEPHONE (OUTPATIENT)
Dept: GASTROENTEROLOGY | Facility: CLINIC | Age: 38
End: 2018-02-14

## 2018-02-21 ENCOUNTER — OFFICE VISIT (OUTPATIENT)
Dept: GASTROENTEROLOGY | Facility: CLINIC | Age: 38
End: 2018-02-21

## 2018-02-21 VITALS
WEIGHT: 172.8 LBS | HEART RATE: 78 BPM | OXYGEN SATURATION: 99 % | DIASTOLIC BLOOD PRESSURE: 70 MMHG | SYSTOLIC BLOOD PRESSURE: 110 MMHG | BODY MASS INDEX: 26.19 KG/M2 | TEMPERATURE: 97.3 F | HEIGHT: 68 IN

## 2018-02-21 DIAGNOSIS — R10.30 LOWER ABDOMINAL PAIN: ICD-10-CM

## 2018-02-21 DIAGNOSIS — K50.119 CROHN'S DISEASE OF LARGE INTESTINE WITH COMPLICATION (HCC): ICD-10-CM

## 2018-02-21 DIAGNOSIS — K50.80 CROHN'S DISEASE OF BOTH SMALL AND LARGE INTESTINE WITHOUT COMPLICATION (HCC): ICD-10-CM

## 2018-02-21 DIAGNOSIS — Z79.60 LONG-TERM USE OF IMMUNOSUPPRESSANT MEDICATION: ICD-10-CM

## 2018-02-21 DIAGNOSIS — D64.9 ANEMIA, UNSPECIFIED TYPE: Primary | ICD-10-CM

## 2018-02-21 PROCEDURE — 99214 OFFICE O/P EST MOD 30 MIN: CPT | Performed by: NURSE PRACTITIONER

## 2018-02-21 RX ORDER — DICYCLOMINE HCL 20 MG
20 TABLET ORAL
Qty: 30 TABLET | Refills: 2 | Status: SHIPPED | OUTPATIENT
Start: 2018-02-21 | End: 2018-03-13 | Stop reason: HOSPADM

## 2018-02-21 RX ORDER — AZATHIOPRINE 50 MG/1
100 TABLET ORAL DAILY
Qty: 180 TABLET | Refills: 1 | Status: SHIPPED | OUTPATIENT
Start: 2018-02-21 | End: 2018-03-31 | Stop reason: HOSPADM

## 2018-02-21 RX ORDER — MESALAMINE 1.2 G/1
2400 TABLET, DELAYED RELEASE ORAL
Qty: 180 TABLET | Refills: 1 | Status: SHIPPED | OUTPATIENT
Start: 2018-02-21 | End: 2018-03-31 | Stop reason: HOSPADM

## 2018-02-21 NOTE — PROGRESS NOTES
GASTROENTEROLOGY OUTPATIENT ESTABLISHED PATIENT NOTE  Patient: SULTANA SIEGEL : 1980  Date of Service: 2018  CC: Abdominal Pain    Assessment/Plan                                             ASSESSMENT & PLANS     Crohn's disease of both small and large intestine without complication  -     Continue azaTHIOprine (IMURAN) 50 MG tablet; Take 2 tablets by mouth Daily.  -     Continue Adalimumab (HUMIRA) 40 MG/0.8ML Pen-injector Kit; Inject 40 mg under the skin Every 7 (Seven) Days.  -     Continuemesalamine (LIALDA) 1.2 g EC tablet; Take 2 tablets by mouth Daily With Breakfast.    Long-term use of immunosuppressant medication  · Pt is counseled on letting us know if pt develops a fever, burning when you pass your urine, a cold or cough that will not go away, or body aches/flu-like symptoms.    Anemia, unspecified type  -     CBC Auto Differential; Future in 4 wks    Lower abdominal pain  -     dicyclomine (BENTYL) 20 MG tablet; Take 1 tablet by mouth 4 (Four) Times a Day Before Meals & at Bedtime As Needed (Abdominal Pain/Cramping).    Follow Up:Return in about 6 months (around 2018) w/ Dr Arciniega since Dr Meier will be retiring.       DISCUSSION: The above plan was delineated in details with patient and all questions and concerns were answered.  Patient is also given contact information.  Patient is to return as scheduled or sooner if new problems arise.   Subjective                                                     SUBJECTIVE   History of Present Illness  Ms. Sultana Siegel is a 37 y.o. female w/ long-standing history of Crohn's disease since , involving cecum, ascending colon, transverse colon and terminal ileum.  Pt was tx initially w/ Remicade for about 4 years with clinical remission, but stopped due to sustained remission.  Hx of anal fissure r/t Crohn's flare about 7 yrs ago. She was also on Humira, Imuran, and Lialda for some time.  Pt went off Imuran for 2 years d/t hair loss.   Reports of being Humira for 7 yrs since 2011.  There is also intermittent gaps of therapy w/ Humira and Lialda d/t compliance and/or insurance issue as well. Pt recently flared despite being on Humira, Imuran, and Lialda.     Pt was hospitalized in last month in 1/2018 for Crohn's flare.  CT scan abdomen and pelvis with contrast, done on 1/9/18 showed significant transverse and descending colitis, but no abscess or fistula.  Asher Handy colorectal surgery recommended surgery, but pt defered and wanted to see if she can be tx medically first. During recent hospitalization, pt was severely anemic with H&H 6.7/24.3.  Patient did not have any gross evidence of bleeding and did not require blood transfusion.  Patient was treated with IV iron and started w/ steroid during recent hospitalization. Due to low Humira drug level and no evidence of Humira antibodies, Humira was increased from every 2 wks to every 1 about 3 wks ago on 2/6/18.  Hurmira and Immuran were continued.  Pt is here fore a f/u.      She has completed steroid Wednesday (a wk ago).  A wk ago, pt had chills and fever 104 once. Pt took Ibuprofen, and pt defervesced. She did not go to the ER or notify us. Pt also had LUQ abd pain, similar as before but as severe.  Sx are improving w/ increased Humira dose. Has been compliant with Humira wkly.  Pt denies dark black stools or bright red blood in the stools, in the toilet bowl, or on the toilet tissue.  Pt is eating better and gaining wt.  Pt denies anorexia, nausea, vomiting, dysphagia, odynophagia, heartburn, reflux, regurgitation, or early satiety.  Pt has good appetite. No abdominal pain. Continues to be asymptomatic of anemia.  No evidence of gross bleeding. She has not seen dermatologist yet since my recommendation last visit.     Last colonoscopy was in 2015    ROS:Review of Systems   Constitutional: Negative.         Pt currently or recently takes NSAIDS ( (i.e Ibuprofen, Aleve, Advil, Exedrin, BC  Powder, diclofenac, meloxicam, & Naproxen, etc)?  No    Pt currently or recently takes abx? Yes   HENT: Negative.    Eyes: Negative.    Respiratory: Negative.    Cardiovascular: Negative.    Gastrointestinal: Positive for abdominal pain.   Endocrine: Negative.    Genitourinary: Negative.    Musculoskeletal: Negative.    Skin: Negative.    Allergic/Immunologic: Negative.    Neurological: Negative.    Hematological: Negative.    Psychiatric/Behavioral: Negative.      Objective                                                           OBJECTIVE   Allergy: Pt has No Known Allergies.  MEDS: •  Adalimumab (HUMIRA) 40 MG/0.8ML Pen-injector Kit, Inject 40 mg under the skin Every 14 (Fourteen) Days. (Patient taking differently: Inject 40 mg under the skin Every 7 (Seven) Days.), Disp: 2 each, Rfl: 2  •  azaTHIOprine (IMURAN) 50 MG tablet, Take 2 tablets by mouth Daily., Disp: 60 tablet, Rfl: 11  •  levocetirizine (XYZAL) 5 MG tablet, Take 5 mg by mouth As Needed., Disp: , Rfl:   •  mesalamine (LIALDA) 1.2 g EC tablet, Take 2,400 mg by mouth Daily With Breakfast., Disp: , Rfl:   •  predniSONE (DELTASONE) 20 MG tablet, Take 1 tablet by mouth 2 (Two) Times a Day., Disp: 28 tablet, Rfl: 0    Current Facility-Administered Medications:   •  cyanocobalamin injection 1,000 mcg, 1,000 mcg, Intramuscular, Once, Patito Avila, VIDAL  Lab Results   Component Value Date    WBC 9.26 01/29/2018    HGB 9.6 (L) 01/29/2018    HCT 34.4 (L) 01/29/2018    MCV 71.1 (L) 01/29/2018    MCHC 27.9 (L) 01/29/2018     (H) 01/29/2018     Lab Results   Component Value Date     01/18/2018    K 4.5 01/18/2018     01/18/2018    CO2 22.0 01/18/2018    BUN 11 01/18/2018    CREATININE 0.80 01/18/2018    GLUCOSE 166 (H) 01/18/2018    CALCIUM 8.9 01/18/2018    ANIONGAP 6.0 01/18/2018     Lab Results   Component Value Date    AST 9 01/17/2018    AST 14 09/14/2017    AST 13 06/22/2016    ALT 6 (L) 01/17/2018    ALT 11 09/14/2017    ALT 10  06/22/2016    ALKPHOS 85 01/17/2018    ALKPHOS 87 09/14/2017    ALKPHOS 56 06/22/2016    BILITOT 0.3 01/17/2018    ALBUMIN 3.70 01/17/2018    LIPASE 26 01/17/2018     No results found for: HGBA1C  No results found for: INR  Lab Results   Component Value Date    HEPAIGM Non-Reactive 01/29/2018    HEPBSAG Non-Reactive 01/29/2018    HEPBIGMCORE Non-Reactive 01/29/2018    HEPCAB Non-Reactive 01/29/2018     Wt Readings from Last 5 Encounters:   02/21/18 78.4 kg (172 lb 12.8 oz)   01/29/18 76.7 kg (169 lb)   01/18/18 78.1 kg (172 lb 3.2 oz)   01/17/18 76.7 kg (169 lb)   09/14/17 80.3 kg (177 lb)   body mass index is 26.27 kg/(m^2).,Temp: 97.3 °F (36.3 °C),BP: 110/70,Heart Rate: 78   Physical Exam  General Well developed; well nourished; no acute distress.   ENT Oral mucosa pink and moist without thrush or lesions.    GI Abd soft, NT, ND, normal active bowel sounds.  No HSM.  No abd hernia    Pt care team: Patito DRAKE & Lauren Young, [facility]--Gastroenterology  [phone]    CC: Dr.Susan Karen Mcmahon MD   [address] [place] [address] FAX:[phone]

## 2018-02-27 ENCOUNTER — TELEPHONE (OUTPATIENT)
Dept: GASTROENTEROLOGY | Facility: CLINIC | Age: 38
End: 2018-02-27

## 2018-02-27 NOTE — TELEPHONE ENCOUNTER
Patient called and left message on nurse line stating she is having chills and fever 104.0 for the past week. I called patient back. I advised patient to go to the ER as soon as possible to be evaluate; to make sure she doesn't have an infection of some type. Patient voiced understanding and agreed to go to the ER.

## 2018-03-05 ENCOUNTER — TELEPHONE (OUTPATIENT)
Dept: GASTROENTEROLOGY | Facility: CLINIC | Age: 38
End: 2018-03-05

## 2018-03-05 DIAGNOSIS — K50.80 CROHN'S DISEASE OF BOTH SMALL AND LARGE INTESTINE WITHOUT COMPLICATION (HCC): ICD-10-CM

## 2018-03-05 NOTE — TELEPHONE ENCOUNTER
Patito,  Can you please send Humira pens script to [facility]? Once a week now. Thanks

## 2018-03-12 ENCOUNTER — TELEPHONE (OUTPATIENT)
Dept: GASTROENTEROLOGY | Facility: CLINIC | Age: 38
End: 2018-03-12

## 2018-03-12 NOTE — TELEPHONE ENCOUNTER
Did she go to the ER on 2/27/18 when she called for similar complaint at that time?    Pls explained to her that there should be any gap in between her wkly Humira injection.  Pls let the drug rep know if pt has problem with getting her medication in time.    If appears that pt is doing worse w/ recurrent fever. Pls have pt move her follow up appt with Dr Arciniega to the next week or so.

## 2018-03-12 NOTE — TELEPHONE ENCOUNTER
Patito,  Patient called and left message on nurse line. I returned her call. She stated she has been having a fever and side pain 3-4 times a day. Patient stated she went to see her PCP last week. She waiting her Humira to come in the mail. I told patient to go to the ER. She stated she is fine to wait on your response. I told her that that you will not be in the office until 12 or 1pm. Patient voiced understanding.

## 2018-03-12 NOTE — TELEPHONE ENCOUNTER
Called patient back. Gave her Patito's recommendation. Patient is scheduled to come in tomorrow to see Dr Arciniega at 2:30pm.

## 2018-03-13 ENCOUNTER — OFFICE VISIT (OUTPATIENT)
Dept: GASTROENTEROLOGY | Facility: CLINIC | Age: 38
End: 2018-03-13

## 2018-03-13 VITALS
HEART RATE: 68 BPM | TEMPERATURE: 99.8 F | DIASTOLIC BLOOD PRESSURE: 72 MMHG | OXYGEN SATURATION: 95 % | WEIGHT: 166 LBS | BODY MASS INDEX: 25.16 KG/M2 | HEIGHT: 68 IN | RESPIRATION RATE: 14 BRPM | SYSTOLIC BLOOD PRESSURE: 118 MMHG

## 2018-03-13 DIAGNOSIS — K50.119 CROHN'S DISEASE OF LARGE INTESTINE WITH COMPLICATION (HCC): Primary | ICD-10-CM

## 2018-03-13 PROCEDURE — 99214 OFFICE O/P EST MOD 30 MIN: CPT | Performed by: INTERNAL MEDICINE

## 2018-03-13 NOTE — PROGRESS NOTES
PCP: Lori Mcmahon MD    Chief Complaint   Patient presents with   • Crohn's Disease       History of Present Illness:   Sultana Siegel is a 37 y.o. female who presents to GI clinic as a follow up for crohn's disease of the large intestine. This is my first time seeing this lady. She was previously managed by Dr. Meier since 2011 and Patito ziegler.  Complains of chills and abdominal pain. Pain llq, luq. Chills since completing steroid taper. On humira sc every week. No detectable ab.  No hematochezia, melena, hematemesis.     Past Medical History:   Diagnosis Date   • Crohn disease        History reviewed. No pertinent surgical history.      Current Outpatient Prescriptions:   •  Adalimumab (HUMIRA) 40 MG/0.8ML Pen-injector Kit, Inject 40 mg under the skin Every 7 (Seven) Days., Disp: 12 each, Rfl: 1  •  azaTHIOprine (IMURAN) 50 MG tablet, Take 2 tablets by mouth Daily., Disp: 180 tablet, Rfl: 1  •  Ferrous Gluconate-C-Folic Acid (IRON-C PO), Take  by mouth., Disp: , Rfl:   •  levocetirizine (XYZAL) 5 MG tablet, Take 5 mg by mouth As Needed., Disp: , Rfl:   •  mesalamine (LIALDA) 1.2 g EC tablet, Take 2 tablets by mouth Daily With Breakfast., Disp: 180 tablet, Rfl: 1    Current Facility-Administered Medications:   •  cyanocobalamin injection 1,000 mcg, 1,000 mcg, Intramuscular, Once, VIDAL Rendon    No Known Allergies    Family History   Problem Relation Age of Onset   • No Known Problems Mother    • No Known Problems Father    • GI problems Brother    • No Known Problems Maternal Grandmother    • No Known Problems Maternal Grandfather    • No Known Problems Paternal Grandmother    • No Known Problems Paternal Grandfather        Social History     Social History   • Marital status: Single     Spouse name: N/A   • Number of children: N/A   • Years of education: N/A     Occupational History   • Not on file.     Social History Main Topics   • Smoking status: Never Smoker   • Smokeless  tobacco: Never Used   • Alcohol use No   • Drug use: No   • Sexual activity: Not on file     Other Topics Concern   • Not on file     Social History Narrative   • No narrative on file       Review of Systems   Constitutional: Positive for appetite change, chills, fatigue, fever and unexpected weight change.   Gastrointestinal: Positive for abdominal pain.   All other systems reviewed and are negative.        Vitals:    03/13/18 1433   BP: 118/72   Pulse: 68   Resp: 14   Temp: 99.8 °F (37.7 °C)   SpO2: 95%       Physical Exam  General Appearance:  Vitals as above. no acute distress  Head/face:  Normocephalic, atraumatic  Eyes:   EOMI, no conjunctivitis or icterus   Nose/Sinuses:  Nares patent bilaterally without discharge or lesions  Mouth/Throat:  Posterior pharynx is pink without drainage or exudates;  dentition is in good condition and repair  Neck:  trachea is midline, no thyromegaly  Lungs:  Clear to auscultation bilaterally  Heart:  Regular rate and rhythm without murmur, gallop or rub  Abdomen:  Soft, non-tender to palpation, no obvious masses, bowel sounds positive in four quadrants; no abdominal bruits; no guarding or rebound tenderness  Neurologic:  Alert; no focal deficits; age appropriate behavior and speech  Psychiatric: mood and affect are congruent  Vascular: extremities without edema  Skin: no rash or cyanosis.      Assessment/Plan  1.) IBD, suspect crohn's disease of large intestine  - Diagnosed> 20 years ago  - Location: reportedly only large intestine. Has seen surgery who recommended a colectomy at one point  - Past therapies:  Remicade, humira. Imuran.  Experienced two flares on imuran only. Experienced two flares on humira and imuran. Humira ab not detectable  - It appears she is failing humira. Will take a look with colonoscopy.  Her chills are worrisome for infection and we will assess images, ideally mri, to rule out abscess.  Favor entyvio if only colon involved.  - blood work today to  assess inflammatory markers. Advised to go to ER if develops severe abdominal pain or fever persists.    Rayray Arciniega MD  3/13/2018

## 2018-03-19 ENCOUNTER — HOSPITAL ENCOUNTER (INPATIENT)
Facility: HOSPITAL | Age: 38
LOS: 4 days | Discharge: HOME | End: 2018-03-23
Attending: EMERGENCY MEDICINE | Admitting: INTERNAL MEDICINE

## 2018-03-19 ENCOUNTER — APPOINTMENT (OUTPATIENT)
Dept: CT IMAGING | Facility: HOSPITAL | Age: 38
End: 2018-03-19

## 2018-03-19 ENCOUNTER — APPOINTMENT (OUTPATIENT)
Dept: MRI IMAGING | Facility: HOSPITAL | Age: 38
End: 2018-03-19
Attending: EMERGENCY MEDICINE

## 2018-03-19 DIAGNOSIS — E61.1 IRON DEFICIENCY: ICD-10-CM

## 2018-03-19 DIAGNOSIS — R11.15 INTRACTABLE CYCLICAL VOMITING WITH NAUSEA: ICD-10-CM

## 2018-03-19 DIAGNOSIS — K50.90 CROHN DISEASE (HCC): ICD-10-CM

## 2018-03-19 DIAGNOSIS — D63.8 ANEMIA OF CHRONIC DISEASE: ICD-10-CM

## 2018-03-19 DIAGNOSIS — K50.914 EXACERBATION OF CROHN'S DISEASE WITH ABSCESS (HCC): Primary | ICD-10-CM

## 2018-03-19 PROBLEM — IMO0002 ABDOMINAL ABSCESS: Status: ACTIVE | Noted: 2018-03-19

## 2018-03-19 LAB
ABO GROUP BLD: NORMAL
ABO GROUP BLD: NORMAL
ALBUMIN SERPL-MCNC: 3.8 G/DL (ref 3.2–4.8)
ALBUMIN/GLOB SERPL: 0.9 G/DL (ref 1.5–2.5)
ALP SERPL-CCNC: 175 U/L (ref 25–100)
ALT SERPL W P-5'-P-CCNC: 10 U/L (ref 7–40)
ANION GAP SERPL CALCULATED.3IONS-SCNC: 10 MMOL/L (ref 3–11)
AST SERPL-CCNC: 13 U/L (ref 0–33)
B-HCG UR QL: NEGATIVE
BACTERIA UR QL AUTO: ABNORMAL /HPF
BASOPHILS # BLD AUTO: 0.06 10*3/MM3 (ref 0–0.2)
BASOPHILS NFR BLD AUTO: 0.8 % (ref 0–1)
BILIRUB SERPL-MCNC: 0.2 MG/DL (ref 0.3–1.2)
BILIRUB UR QL STRIP: NEGATIVE
BLD GP AB SCN SERPL QL: NEGATIVE
BUN BLD-MCNC: 10 MG/DL (ref 9–23)
BUN/CREAT SERPL: 12.5 (ref 7–25)
CALCIUM SPEC-SCNC: 9.2 MG/DL (ref 8.7–10.4)
CHLORIDE SERPL-SCNC: 100 MMOL/L (ref 99–109)
CLARITY UR: ABNORMAL
CO2 SERPL-SCNC: 26 MMOL/L (ref 20–31)
COLOR UR: ABNORMAL
CREAT BLD-MCNC: 0.8 MG/DL (ref 0.6–1.3)
CRP SERPL-MCNC: 29.02 MG/DL (ref 0–1)
D-LACTATE SERPL-SCNC: 1.1 MMOL/L (ref 0.5–2)
DEPRECATED RDW RBC AUTO: 57.7 FL (ref 37–54)
EOSINOPHIL # BLD AUTO: 0.9 10*3/MM3 (ref 0–0.3)
EOSINOPHIL NFR BLD AUTO: 11.7 % (ref 0–3)
ERYTHROCYTE [DISTWIDTH] IN BLOOD BY AUTOMATED COUNT: 22.6 % (ref 11.3–14.5)
ERYTHROCYTE [SEDIMENTATION RATE] IN BLOOD: 106 MM/HR (ref 0–20)
FLUAV SUBTYP SPEC NAA+PROBE: NOT DETECTED
FLUBV RNA ISLT QL NAA+PROBE: NOT DETECTED
GFR SERPL CREATININE-BSD FRML MDRD: 80 ML/MIN/1.73
GLOBULIN UR ELPH-MCNC: 4.1 GM/DL
GLUCOSE BLD-MCNC: 120 MG/DL (ref 70–100)
GLUCOSE UR STRIP-MCNC: NEGATIVE MG/DL
HCT VFR BLD AUTO: 27 % (ref 34.5–44)
HGB BLD-MCNC: 7.8 G/DL (ref 11.5–15.5)
HGB UR QL STRIP.AUTO: NEGATIVE
HOLD SPECIMEN: NORMAL
HOLD SPECIMEN: NORMAL
HYALINE CASTS UR QL AUTO: ABNORMAL /LPF
HYPOCHROMIA BLD QL: NORMAL
IMM GRANULOCYTES # BLD: 0.03 10*3/MM3 (ref 0–0.03)
IMM GRANULOCYTES NFR BLD: 0.4 % (ref 0–0.6)
KETONES UR QL STRIP: ABNORMAL
LEUKOCYTE ESTERASE UR QL STRIP.AUTO: ABNORMAL
LIPASE SERPL-CCNC: 25 U/L (ref 6–51)
LYMPHOCYTES # BLD AUTO: 1.12 10*3/MM3 (ref 0.6–4.8)
LYMPHOCYTES NFR BLD AUTO: 14.6 % (ref 24–44)
MCH RBC QN AUTO: 20.9 PG (ref 27–31)
MCHC RBC AUTO-ENTMCNC: 28.9 G/DL (ref 32–36)
MCV RBC AUTO: 72.2 FL (ref 80–99)
MICROCYTES BLD QL: NORMAL
MONOCYTES # BLD AUTO: 0.68 10*3/MM3 (ref 0–1)
MONOCYTES NFR BLD AUTO: 8.9 % (ref 0–12)
MUCOUS THREADS URNS QL MICRO: ABNORMAL /HPF
NEUTROPHILS # BLD AUTO: 4.88 10*3/MM3 (ref 1.5–8.3)
NEUTROPHILS NFR BLD AUTO: 63.6 % (ref 41–71)
NITRITE UR QL STRIP: NEGATIVE
PH UR STRIP.AUTO: 5.5 [PH] (ref 5–8)
PLAT MORPH BLD: NORMAL
PLATELET # BLD AUTO: 1075 10*3/MM3 (ref 150–450)
PMV BLD AUTO: 8.1 FL (ref 6–12)
POTASSIUM BLD-SCNC: 3.2 MMOL/L (ref 3.5–5.5)
PROT SERPL-MCNC: 7.9 G/DL (ref 5.7–8.2)
PROT UR QL STRIP: ABNORMAL
RBC # BLD AUTO: 3.74 10*6/MM3 (ref 3.89–5.14)
RBC # UR: ABNORMAL /HPF
REF LAB TEST METHOD: ABNORMAL
RH BLD: NEGATIVE
RH BLD: NEGATIVE
SODIUM BLD-SCNC: 136 MMOL/L (ref 132–146)
SP GR UR STRIP: 1.02 (ref 1–1.03)
SQUAMOUS #/AREA URNS HPF: ABNORMAL /HPF
UROBILINOGEN UR QL STRIP: ABNORMAL
WBC MORPH BLD: NORMAL
WBC NRBC COR # BLD: 7.67 10*3/MM3 (ref 3.5–10.8)
WBC UR QL AUTO: ABNORMAL /HPF
WHOLE BLOOD HOLD SPECIMEN: NORMAL
WHOLE BLOOD HOLD SPECIMEN: NORMAL

## 2018-03-19 PROCEDURE — 83605 ASSAY OF LACTIC ACID: CPT | Performed by: EMERGENCY MEDICINE

## 2018-03-19 PROCEDURE — 83690 ASSAY OF LIPASE: CPT | Performed by: EMERGENCY MEDICINE

## 2018-03-19 PROCEDURE — 99284 EMERGENCY DEPT VISIT MOD MDM: CPT

## 2018-03-19 PROCEDURE — 25010000002 HYDROMORPHONE PER 4 MG: Performed by: EMERGENCY MEDICINE

## 2018-03-19 PROCEDURE — 0 DIATRIZOATE MEGLUMINE & SODIUM PER 1 ML

## 2018-03-19 PROCEDURE — 81001 URINALYSIS AUTO W/SCOPE: CPT | Performed by: EMERGENCY MEDICINE

## 2018-03-19 PROCEDURE — 86900 BLOOD TYPING SEROLOGIC ABO: CPT | Performed by: FAMILY MEDICINE

## 2018-03-19 PROCEDURE — 25010000002 GLUCAGON (HUMAN RECOMBINANT) 1 MG RECONSTITUTED SOLUTION: Performed by: EMERGENCY MEDICINE

## 2018-03-19 PROCEDURE — 25010000002 IOPAMIDOL 61 % SOLUTION: Performed by: FAMILY MEDICINE

## 2018-03-19 PROCEDURE — 86901 BLOOD TYPING SEROLOGIC RH(D): CPT

## 2018-03-19 PROCEDURE — 0 DIATRIZOATE MEGLUMINE & SODIUM PER 1 ML: Performed by: EMERGENCY MEDICINE

## 2018-03-19 PROCEDURE — 85007 BL SMEAR W/DIFF WBC COUNT: CPT | Performed by: EMERGENCY MEDICINE

## 2018-03-19 PROCEDURE — 87186 SC STD MICRODIL/AGAR DIL: CPT | Performed by: EMERGENCY MEDICINE

## 2018-03-19 PROCEDURE — A9577 INJ MULTIHANCE: HCPCS | Performed by: EMERGENCY MEDICINE

## 2018-03-19 PROCEDURE — 87077 CULTURE AEROBIC IDENTIFY: CPT | Performed by: EMERGENCY MEDICINE

## 2018-03-19 PROCEDURE — 25010000002 HYDROMORPHONE PER 4 MG: Performed by: FAMILY MEDICINE

## 2018-03-19 PROCEDURE — 86901 BLOOD TYPING SEROLOGIC RH(D): CPT | Performed by: FAMILY MEDICINE

## 2018-03-19 PROCEDURE — 87502 INFLUENZA DNA AMP PROBE: CPT | Performed by: INTERNAL MEDICINE

## 2018-03-19 PROCEDURE — 80053 COMPREHEN METABOLIC PANEL: CPT | Performed by: EMERGENCY MEDICINE

## 2018-03-19 PROCEDURE — 74183 MRI ABD W/O CNTR FLWD CNTR: CPT

## 2018-03-19 PROCEDURE — 99223 1ST HOSP IP/OBS HIGH 75: CPT | Performed by: FAMILY MEDICINE

## 2018-03-19 PROCEDURE — 74178 CT ABD&PLV WO CNTR FLWD CNTR: CPT

## 2018-03-19 PROCEDURE — 85025 COMPLETE CBC W/AUTO DIFF WBC: CPT | Performed by: EMERGENCY MEDICINE

## 2018-03-19 PROCEDURE — 86850 RBC ANTIBODY SCREEN: CPT | Performed by: FAMILY MEDICINE

## 2018-03-19 PROCEDURE — 81025 URINE PREGNANCY TEST: CPT | Performed by: EMERGENCY MEDICINE

## 2018-03-19 PROCEDURE — 86140 C-REACTIVE PROTEIN: CPT | Performed by: EMERGENCY MEDICINE

## 2018-03-19 PROCEDURE — 87086 URINE CULTURE/COLONY COUNT: CPT | Performed by: EMERGENCY MEDICINE

## 2018-03-19 PROCEDURE — 86900 BLOOD TYPING SEROLOGIC ABO: CPT

## 2018-03-19 PROCEDURE — 87040 BLOOD CULTURE FOR BACTERIA: CPT | Performed by: EMERGENCY MEDICINE

## 2018-03-19 PROCEDURE — 25010000002 PIPERACILLIN-TAZOBACTAM: Performed by: FAMILY MEDICINE

## 2018-03-19 PROCEDURE — 25010000002 PIPERACILLIN-TAZOBACTAM: Performed by: EMERGENCY MEDICINE

## 2018-03-19 PROCEDURE — 84630 ASSAY OF ZINC: CPT | Performed by: COLON & RECTAL SURGERY

## 2018-03-19 PROCEDURE — 86923 COMPATIBILITY TEST ELECTRIC: CPT

## 2018-03-19 PROCEDURE — 72197 MRI PELVIS W/O & W/DYE: CPT

## 2018-03-19 PROCEDURE — 99253 IP/OBS CNSLTJ NEW/EST LOW 45: CPT | Performed by: INTERNAL MEDICINE

## 2018-03-19 PROCEDURE — 25010000002 VANCOMYCIN 10 G RECONSTITUTED SOLUTION: Performed by: EMERGENCY MEDICINE

## 2018-03-19 PROCEDURE — 25010000002 ONDANSETRON PER 1 MG: Performed by: EMERGENCY MEDICINE

## 2018-03-19 PROCEDURE — 25010000002 FLUCONAZOLE PER 200 MG: Performed by: COLON & RECTAL SURGERY

## 2018-03-19 PROCEDURE — 0 GADOBENATE DIMEGLUMINE 529 MG/ML SOLUTION: Performed by: EMERGENCY MEDICINE

## 2018-03-19 PROCEDURE — 85652 RBC SED RATE AUTOMATED: CPT | Performed by: EMERGENCY MEDICINE

## 2018-03-19 RX ORDER — VANCOMYCIN HYDROCHLORIDE 1 G/200ML
1000 INJECTION, SOLUTION INTRAVENOUS EVERY 12 HOURS
Status: DISCONTINUED | OUTPATIENT
Start: 2018-03-20 | End: 2018-03-20

## 2018-03-19 RX ORDER — ONDANSETRON 2 MG/ML
4 INJECTION INTRAMUSCULAR; INTRAVENOUS ONCE
Status: COMPLETED | OUTPATIENT
Start: 2018-03-19 | End: 2018-03-19

## 2018-03-19 RX ORDER — FERROUS SULFATE 325(65) MG
325 TABLET ORAL
COMMUNITY
End: 2018-03-31 | Stop reason: HOSPADM

## 2018-03-19 RX ORDER — SODIUM CHLORIDE 9 MG/ML
125 INJECTION, SOLUTION INTRAVENOUS CONTINUOUS
Status: ACTIVE | OUTPATIENT
Start: 2018-03-19 | End: 2018-03-20

## 2018-03-19 RX ORDER — FLUCONAZOLE 2 MG/ML
400 INJECTION, SOLUTION INTRAVENOUS DAILY
Status: COMPLETED | OUTPATIENT
Start: 2018-03-19 | End: 2018-03-21

## 2018-03-19 RX ORDER — SODIUM CHLORIDE 0.9 % (FLUSH) 0.9 %
10 SYRINGE (ML) INJECTION AS NEEDED
Status: DISCONTINUED | OUTPATIENT
Start: 2018-03-19 | End: 2018-03-23 | Stop reason: HOSPADM

## 2018-03-19 RX ORDER — HYDROMORPHONE HYDROCHLORIDE 1 MG/ML
0.5 INJECTION, SOLUTION INTRAMUSCULAR; INTRAVENOUS; SUBCUTANEOUS
Status: DISCONTINUED | OUTPATIENT
Start: 2018-03-19 | End: 2018-03-23 | Stop reason: HOSPADM

## 2018-03-19 RX ORDER — ACETAMINOPHEN 325 MG/1
650 TABLET ORAL EVERY 6 HOURS PRN
Status: DISCONTINUED | OUTPATIENT
Start: 2018-03-19 | End: 2018-03-23 | Stop reason: HOSPADM

## 2018-03-19 RX ORDER — HYDROMORPHONE HYDROCHLORIDE 1 MG/ML
1 INJECTION, SOLUTION INTRAMUSCULAR; INTRAVENOUS; SUBCUTANEOUS ONCE
Status: COMPLETED | OUTPATIENT
Start: 2018-03-19 | End: 2018-03-19

## 2018-03-19 RX ORDER — VANCOMYCIN/0.9 % SOD CHLORIDE 1.5G/250ML
20 PLASTIC BAG, INJECTION (ML) INTRAVENOUS ONCE
Status: COMPLETED | OUTPATIENT
Start: 2018-03-19 | End: 2018-03-19

## 2018-03-19 RX ADMIN — HYDROMORPHONE HYDROCHLORIDE 0.5 MG: 10 INJECTION INTRAMUSCULAR; INTRAVENOUS; SUBCUTANEOUS at 16:16

## 2018-03-19 RX ADMIN — Medication 45 ML: at 09:48

## 2018-03-19 RX ADMIN — VANCOMYCIN HYDROCHLORIDE 1500 MG: 10 INJECTION, POWDER, LYOPHILIZED, FOR SOLUTION INTRAVENOUS at 13:25

## 2018-03-19 RX ADMIN — FLUCONAZOLE 400 MG: 2 INJECTION, SOLUTION INTRAVENOUS at 21:00

## 2018-03-19 RX ADMIN — ACETAMINOPHEN 650 MG: 325 TABLET ORAL at 18:12

## 2018-03-19 RX ADMIN — IOPAMIDOL 95 ML: 612 INJECTION, SOLUTION INTRAVENOUS at 18:24

## 2018-03-19 RX ADMIN — PIPERACILLIN SODIUM,TAZOBACTAM SODIUM 3.38 G: 3; .375 INJECTION, POWDER, FOR SOLUTION INTRAVENOUS at 18:39

## 2018-03-19 RX ADMIN — SODIUM CHLORIDE 125 ML/HR: 9 INJECTION, SOLUTION INTRAVENOUS at 15:28

## 2018-03-19 RX ADMIN — ONDANSETRON 4 MG: 2 INJECTION INTRAMUSCULAR; INTRAVENOUS at 07:35

## 2018-03-19 RX ADMIN — TAZOBACTAM SODIUM AND PIPERACILLIN SODIUM 4.5 G: .5; 4 INJECTION, POWDER, LYOPHILIZED, FOR SOLUTION INTRAVENOUS at 11:13

## 2018-03-19 RX ADMIN — Medication 15 ML: at 16:48

## 2018-03-19 RX ADMIN — HYDROMORPHONE HYDROCHLORIDE 1 MG: 10 INJECTION INTRAMUSCULAR; INTRAVENOUS; SUBCUTANEOUS at 07:35

## 2018-03-19 RX ADMIN — GLUCAGON HYDROCHLORIDE 2 MG: 1 INJECTION, POWDER, FOR SOLUTION INTRAMUSCULAR; INTRAVENOUS; SUBCUTANEOUS at 08:50

## 2018-03-19 RX ADMIN — Medication: at 16:45

## 2018-03-19 RX ADMIN — SODIUM CHLORIDE 1000 ML: 9 INJECTION, SOLUTION INTRAVENOUS at 07:35

## 2018-03-19 RX ADMIN — GADOBENATE DIMEGLUMINE 15 ML: 529 INJECTION, SOLUTION INTRAVENOUS at 09:47

## 2018-03-19 NOTE — H&P
[facility] Medicine Services  HISTORY AND PHYSICAL    Patient Name: Sultana Siegel  : 1980  MRN: [id number]  Primary Care Physician: Lori Mcmahon MD    Subjective   Subjective     Chief Complaint:  abd pain     HPI:  Sultana Siegel is a 37 y.o. female with hx of Crohn's Dz.  Had a flare earlier and Humira was adjusted from every other week to weekly.  See's Dr. Meier but saw Dr. Arciniega last week and was scheduled for an MRI for 2 weeks.   Started with increasing ab pain x 2 weeks to 1 month since she stopped prednisone.  Denies diarrhea or bleeding.  She is nauseated but no vomiting. She has had fever to 102 and chills. She has had peritonitis in the past but never had surgery or ICU admission.     In ER 1 L NS , zosyn and Vanc     Review of Systems   Pos fever and chills, abd pain, anorexia    Otherwise 10-system ROS reviewed and is negative except as mentioned in the HPI.    Personal History     Past Medical History:   Diagnosis Date   • Crohn disease        History reviewed. No pertinent surgical history.    Family History: family history includes GI problems in her brother; No Known Problems in her father, maternal grandfather, maternal grandmother, mother, paternal grandfather, and paternal grandmother.     Social History:  reports that she has never smoked. She has never used smokeless tobacco. She reports that she does not drink alcohol or use drugs.  Social History     Social History Narrative   • No narrative on file       Medications:  Facility-Administered Medications Prior to Admission   Medication Dose Route Frequency Provider Last Rate Last Dose   • cyanocobalamin injection 1,000 mcg  1,000 mcg Intramuscular Once VIDAL Rendon         Prescriptions Prior to Admission   Medication Sig Dispense Refill Last Dose   • adalimumab (HUMIRA) 40 MG/0.8ML Prefilled Syringe Kit injection Inject 40 mg under the skin 1 (One) Time Per Week. On       •  azaTHIOprine (IMURAN) 50 MG tablet Take 2 tablets by mouth Daily. 180 tablet 1 Taking   • Cyanocobalamin (B-12) 1000 MCG/ML kit Inject 1,000 mcg as directed 1 (One) Time Per Week. On Tuesdays      • ferrous sulfate 325 (65 FE) MG tablet Take 325 mg by mouth Daily With Breakfast.      • levocetirizine (XYZAL) 5 MG tablet Take 5 mg by mouth As Needed.   Taking   • mesalamine (LIALDA) 1.2 g EC tablet Take 2 tablets by mouth Daily With Breakfast. 180 tablet 1 Taking       No Known Allergies    Objective   Objective     Vital Signs:   Temp:  [97.4 °F (36.3 °C)-98.8 °F (37.1 °C)] 98.8 °F (37.1 °C)  Heart Rate:  [73-91] 83  Resp:  [16-18] 16  BP: (104-119)/(56-68) 104/56        Physical Exam   Constitutional: ill appearing , awake, alert  Eyes: PERRLA, sclerae anicteric, no conjunctival injection  HENT: NCAT, mucous membranes dry   Neck: Supple, no thyromegaly, no lymphadenopathy, trachea midline  Respiratory: Clear to auscultation bilaterally, nonlabored respirations   Cardiovascular: RRR, no murmurs, rubs, or gallops, palpable pedal pulses bilaterally  Gastrointestinal:decreased bowel sounds, soft,generalized tender, minimal distended  Musculoskeletal: No bilateral ankle edema, no clubbing or cyanosis to extremities  Psychiatric: Appropriate affect, cooperative  Neurologic: Oriented x 3, strength symmetric in all extremities, Cranial Nerves grossly intact to confrontation, speech clear  Skin: No rashes      Results Reviewed:  I have personally reviewed current lab, radiology, and data and agree.      Results from last 7 days  Lab Units 03/19/18  0701   WBC 10*3/mm3 7.67   HEMOGLOBIN g/dL 7.8*   HEMATOCRIT % 27.0*   PLATELETS 10*3/mm3 1,075*       Results from last 7 days  Lab Units 03/19/18  0701   SODIUM mmol/L 136   POTASSIUM mmol/L 3.2*   CHLORIDE mmol/L 100   CO2 mmol/L 26.0   BUN mg/dL 10   CREATININE mg/dL 0.80   GLUCOSE mg/dL 120*   CALCIUM mg/dL 9.2   ALT (SGPT) U/L 10   AST (SGOT) U/L 13     Estimated Creatinine  Clearance: 112.7 mL/min (by C-G formula based on SCr of 0.8 mg/dL).  Brief Urine Lab Results  (Last result in the past 365 days)      Color   Clarity   Blood   Leuk Est   Nitrite   Protein   CREAT   Urine HCG        03/19/18 0738               Negative     03/19/18 0738 Dark Yellow(A) Cloudy(A) Negative Small (1+)(A) Negative 30 mg/dL (1+)(A)             No results found for: BNP  No results found for: PHART  Imaging Results (last 24 hours)     Procedure Component Value Units Date/Time    MRI Abdomen Pelvis Enterography [[id number]] Collected:  03/19/18 1134     Updated:  03/19/18 1135    Narrative:       EXAMINATION: MRI ABDOMEN AND PELVIS W/WO CONTRAST ENTEROGRAPHY-      INDICATION: Crohn disease suspected, severe abdominal pain  (fever/vomiting/leukocytosis).     TECHNIQUE: Routine MR imaging was obtained of the abdomen and pelvis pre  and post administration of oral and intravenous contrast according to  the enterography protocol.        COMPARISON: 01/09/2018.     FINDINGS: There is no significant change seen in the appearance of the  distal transverse and descending colon when compared to the prior study  of 01/09/2018. There is abnormal wall thickening identified of the colon  with pericolonic stranding. There is edema identified throughout the  mesentery surrounding the descending colon. There are several areas  concerning for extraluminal air and microperforation in the inflammatory  changes within the left flank. There is surrounding phlegmon and soft  tissue. Findings are concerning for multiloculated abscess with some  small pockets of fluid identified. Consider CT scan for further  evaluation of the changes within the left flank if clinically indicated.  The remainder of the bowel reveals no other gross abnormality. The liver  is homogeneous in appearance. No stones seen in the gallbladder. There  is distention of the stomach. Pancreas is homogeneous. Spleen is  unremarkable. The kidneys and adrenal  glands within normal limits. No  abdominal or retroperitoneal lymphadenopathy.     PELVIS: The pelvic organs are unremarkable. The pelvic portions of the  gastrointestinal tract are within normal limits. No pelvic adenopathy.  No free fluid or free air. Bony structures are unremarkable. No abnormal  mass or fluid collection is identified.           Impression:       There is abnormal wall thickening again seen of the distal  transverse colon and descending colon with findings concerning for  multiloculated abscess with extraluminal air seen surrounding the  phlegmon and extensive inflammatory changes seen within the mesenteric  fat in the left flank. Consider CT scan for further evaluation     D:  03/19/2018  E:  03/19/2018                   Assessment/Plan   Assessment / Plan           Assessment & Plan:  Immunocompromised Host with Abd Abscess and microperf's secondary to Crohns  --Await blood cx  --Will ask ID to see and follow manage abx   --Vanc and Zosyn for now  --contiue IVF  --GI and Surgery to see. I have DW Dr. Alfaro and Dr. Abebe   --Elevated CRP  --hold immunosuppresants     Questionable UTI  --IV abx for now, follow urine cx.     Hypokalemia    Anemia   --will moniter closely, will type and cross and transfuse for <7    Throbocytosis    Pt is concerning with Immunocompromised and severity of abd infection.  We will resusitate aggressively with IVF, Abx, if she becomes hemodynamically unstable we will transfer her to ICU for closer monitering .      DVT prophylaxis:teds/scds, hold on AC for now due to concern for surgical intervention     CODE STATUS:  Prior FULL     Admission Status:  I believe this patient meets INPATIENT status due to the need for care which can only be reasonably provided in an hospital setting such as aggressive/expedited ancillary services and/or consultation services, the necessity for IV medications, close physician monitoring and/or the possible need for procedures.  In  such, I feel patient’s risk for adverse outcomes and need for care warrant INPATIENT evaluation and predict the patient’s care encounter to likely last beyond 2 midnights.        Electronically signed by Jenn Simpson DO, 03/19/18, 2:14 PM.

## 2018-03-19 NOTE — PROGRESS NOTES
"Pharmacy Consult-Vancomycin Dosing  Sultana Siegel is a  37 y.o. female receiving vancomycin therapy.     Indication: Intra-abdominal infection  Consulting Provider: Hospitalist  ID Consult: No    Goal Trough: 15-20mcg/mL    Current Antimicrobial Therapy  Anti-Infectives       Ordered     Dose/Rate Route Frequency Start Stop    03/19/18 1433  piperacillin-tazobactam (ZOSYN) 3.375 g in iso-osmotic dextrose 50 ml (premix)     Ordering Provider:  Jenn Simpson DO    3.375 g Intravenous Every 6 Hours 03/19/18 1515 03/21/18 0914    03/19/18 1432  Pharmacy to dose vancomycin     Ordering Provider:  Jenn Simpson DO     Does not apply Continuous PRN 03/19/18 1432 03/26/18 1431    03/19/18 1043  piperacillin-tazobactam (ZOSYN) 4.5 g/100 mL 0.9% NS IVPB (mbp)     Ordering Provider:  Paulo Blackman MD    4.5 g Intravenous Once 03/19/18 1045 03/19/18 1327    03/19/18 1043  vancomycin IVPB 1500 mg in 0.9% NaCl (Premix) 500 mL     Ordering Provider:  Paulo Blackman MD    20 mg/kg × 72.6 kg Intravenous Once 03/19/18 1045 03/19/18 1325            Allergies  Allergies as of 03/19/2018   • (No Known Allergies)       Labs      Results from last 7 days     Lab Units 03/19/18  0701   BUN mg/dL 10   CREATININE mg/dL 0.80         Results from last 7 days     Lab Units 03/19/18  0701   WBC 10*3/mm3 7.67       Evaluation of Dosing     Last Dose Received in the ED/Outside Facility: Vancomycin 1500mg IV X 1 @ 1325 on 3/19    Ht - 172.7 cm (68\")  Wt - 74.9 kg (165 lb 3.2 oz)    Estimated Creatinine Clearance: 112.7 mL/min (by C-G formula based on SCr of 0.8 mg/dL).    Intake & Output (last 3 days)       None            Microbiology and Radiology  Microbiology Results (last 10 days)       ** No results found for the last 240 hours. **            Evaluation of Level    No results found for: CIRA STANTON VANCORANDOM    Assessment/Plan:  PTD Vancomycin for potential intra-abdominal infection, goal trough 15-20mcg/mL. Pt " received Vancomycin 1500mg(20mg/kg) IV X 1 in the ED today @ 1325. Will initiate Vancomycin 1000mg IV q12h for now and obtain a trough prior to 4th total dose. Pharmacy will continue to follow and adjust dosing based on renal function and clinical picture.    Baltazar Thurman, PharmD  Pharmacy Resident  3/19/2018  2:46 PM

## 2018-03-19 NOTE — ED PROVIDER NOTES
Subjective   She has a two decades long history of Crohn's disease.  Recent flare earlier this year treated with increasing Humira from every other week to every week.  Was doing better but started having more pain two weeks ago.  Plan for MRI with consideration of medication change afterwards.  However, she is now having worse and worse pain to the point of near immobility.  Fevers to 102 degrees.  No diarrhea or bleeding.  Nauseated without vomiting.        History provided by:  Patient  Abdominal Pain   Pain location:  LUQ  Pain radiates to:  Does not radiate  Pain severity:  Severe  Onset quality:  Gradual  Timing:  Constant  Progression:  Worsening  Chronicity:  Recurrent  Relieved by:  Nothing  Ineffective treatments: her standard Crohn's treatments.  Associated symptoms: anorexia, chills and fever    Associated symptoms: no diarrhea        Review of Systems   Constitutional: Positive for chills and fever.   HENT: Negative.    Respiratory: Negative.    Cardiovascular: Negative.    Gastrointestinal: Positive for abdominal pain and anorexia. Negative for diarrhea.   Musculoskeletal: Negative for arthralgias.   Skin: Negative for rash.   Neurological: Negative.    Psychiatric/Behavioral: Negative.        Past Medical History:   Diagnosis Date   • Crohn disease        No Known Allergies    History reviewed. No pertinent surgical history.    Family History   Problem Relation Age of Onset   • No Known Problems Mother    • No Known Problems Father    • GI problems Brother    • No Known Problems Maternal Grandmother    • No Known Problems Maternal Grandfather    • No Known Problems Paternal Grandmother    • No Known Problems Paternal Grandfather        Social History     Social History   • Marital status: Single     Social History Main Topics   • Smoking status: Never Smoker   • Smokeless tobacco: Never Used   • Alcohol use No   • Drug use: No     Other Topics Concern   • Not on file           Objective   Physical  Exam   Constitutional: She is oriented to person, place, and time. She appears well-developed and well-nourished. No distress.   Stoic appearing female, non-toxic appearing, moves with difficulty.   HENT:   Head: Atraumatic.   Airway patent   Eyes: Conjunctivae are normal.   Neck: Phonation normal. Neck supple.   Cardiovascular: Normal rate, regular rhythm and normal heart sounds.    Pulmonary/Chest: Effort normal and breath sounds normal. No respiratory distress.   Abdominal: Soft. There is tenderness (tender across areas of transverse and descending colon). There is no guarding.   Lymphadenopathy:     She has no cervical adenopathy.   Neurological: She is alert and oriented to person, place, and time.   Skin: Skin is warm and dry.   Psychiatric: She has a normal mood and affect. Her behavior is normal.   Nursing note and vitals reviewed.      Critical Care  Performed by: JACKELINE MOSELEY  Authorized by: JACKELINE MOSELEY     Critical care provider statement:     Critical care time (minutes):  40    Critical care time was exclusive of:  Separately billable procedures and treating other patients and teaching time    Critical care was necessary to treat or prevent imminent or life-threatening deterioration of the following conditions:  Sepsis    Critical care was time spent personally by me on the following activities:  Development of treatment plan with patient or surrogate, discussions with consultants, evaluation of patient's response to treatment, examination of patient, obtaining history from patient or surrogate, review of old charts, re-evaluation of patient's condition, pulse oximetry, ordering and review of radiographic studies, ordering and review of laboratory studies and ordering and performing treatments and interventions               ED Course  ED Course   Comment By Time   Critical care due to risk for overwhelming infection in immunocompromised patient with perforated colon/abscess formation from  Crohn's.  In ED, she has been hemodynamically stable but was given 1 liter of saline along with initial antibiotics.  Treatment discussed with Becca Alfaro and Calvin. Paulo Blackman MD 03/19 1217   Urinalysis may very well represent contaminated urine as she has no typical UTI symptoms. Paulo Blackman MD 03/19 1218      Recent Results (from the past 24 hour(s))   Comprehensive Metabolic Panel    Collection Time: 03/19/18  7:01 AM   Result Value Ref Range    Glucose 120 (H) 70 - 100 mg/dL    BUN 10 9 - 23 mg/dL    Creatinine 0.80 0.60 - 1.30 mg/dL    Sodium 136 132 - 146 mmol/L    Potassium 3.2 (L) 3.5 - 5.5 mmol/L    Chloride 100 99 - 109 mmol/L    CO2 26.0 20.0 - 31.0 mmol/L    Calcium 9.2 8.7 - 10.4 mg/dL    Total Protein 7.9 5.7 - 8.2 g/dL    Albumin 3.80 3.20 - 4.80 g/dL    ALT (SGPT) 10 7 - 40 U/L    AST (SGOT) 13 0 - 33 U/L    Alkaline Phosphatase 175 (H) 25 - 100 U/L    Total Bilirubin 0.2 (L) 0.3 - 1.2 mg/dL    eGFR Non African Amer 80 >60 mL/min/1.73    Globulin 4.1 gm/dL    A/G Ratio 0.9 (L) 1.5 - 2.5 g/dL    BUN/Creatinine Ratio 12.5 7.0 - 25.0    Anion Gap 10.0 3.0 - 11.0 mmol/L   Lipase    Collection Time: 03/19/18  7:01 AM   Result Value Ref Range    Lipase 25 6 - 51 U/L   Light Blue Top    Collection Time: 03/19/18  7:01 AM   Result Value Ref Range    Extra Tube hold for add-on    Green Top (Gel)    Collection Time: 03/19/18  7:01 AM   Result Value Ref Range    Extra Tube Hold for add-ons.    Lavender Top    Collection Time: 03/19/18  7:01 AM   Result Value Ref Range    Extra Tube hold for add-on    Gold Top - SST    Collection Time: 03/19/18  7:01 AM   Result Value Ref Range    Extra Tube Hold for add-ons.    CBC Auto Differential    Collection Time: 03/19/18  7:01 AM   Result Value Ref Range    WBC 7.67 3.50 - 10.80 10*3/mm3    RBC 3.74 (L) 3.89 - 5.14 10*6/mm3    Hemoglobin 7.8 (L) 11.5 - 15.5 g/dL    Hematocrit 27.0 (L) 34.5 - 44.0 %    MCV 72.2 (L) 80.0 - 99.0 fL    MCH 20.9 (L) 27.0 - 31.0 pg     MCHC 28.9 (L) 32.0 - 36.0 g/dL    RDW 22.6 (H) 11.3 - 14.5 %    RDW-SD 57.7 (H) 37.0 - 54.0 fl    MPV 8.1 6.0 - 12.0 fL    Platelets 1,075 (C) 150 - 450 10*3/mm3    Neutrophil % 63.6 41.0 - 71.0 %    Lymphocyte % 14.6 (L) 24.0 - 44.0 %    Monocyte % 8.9 0.0 - 12.0 %    Eosinophil % 11.7 (H) 0.0 - 3.0 %    Basophil % 0.8 0.0 - 1.0 %    Immature Grans % 0.4 0.0 - 0.6 %    Neutrophils, Absolute 4.88 1.50 - 8.30 10*3/mm3    Lymphocytes, Absolute 1.12 0.60 - 4.80 10*3/mm3    Monocytes, Absolute 0.68 0.00 - 1.00 10*3/mm3    Eosinophils, Absolute 0.90 (H) 0.00 - 0.30 10*3/mm3    Basophils, Absolute 0.06 0.00 - 0.20 10*3/mm3    Immature Grans, Absolute 0.03 0.00 - 0.03 10*3/mm3   Scan Slide    Collection Time: 03/19/18  7:01 AM   Result Value Ref Range    Hypochromia Mod/2+ None Seen    Microcytes Mod/2+ None Seen    WBC Morphology Normal Normal    Platelet Morphology Normal Normal   C-reactive Protein    Collection Time: 03/19/18  7:29 AM   Result Value Ref Range    C-Reactive Protein 29.02 (H) 0.00 - 1.00 mg/dL   Urinalysis With / Culture If Indicated - Urine, Clean Catch    Collection Time: 03/19/18  7:38 AM   Result Value Ref Range    Color, UA Dark Yellow (A) Yellow, Straw    Appearance, UA Cloudy (A) Clear    pH, UA 5.5 5.0 - 8.0    Specific Gravity, UA 1.024 1.001 - 1.030    Glucose, UA Negative Negative    Ketones, UA Trace (A) Negative    Bilirubin, UA Negative Negative    Blood, UA Negative Negative    Protein, UA 30 mg/dL (1+) (A) Negative    Leuk Esterase, UA Small (1+) (A) Negative    Nitrite, UA Negative Negative    Urobilinogen, UA 1.0 E.U./dL 0.2 - 1.0 E.U./dL   Urinalysis, Microscopic Only - Urine, Clean Catch    Collection Time: 03/19/18  7:38 AM   Result Value Ref Range    RBC, UA 0-2 None Seen, 0-2 /HPF    WBC, UA 13-20 (A) None Seen, 0-2 /HPF    Bacteria, UA Trace None Seen, Trace /HPF    Squamous Epithelial Cells, UA 7-12 (A) None Seen, 0-2 /HPF    Hyaline Casts, UA 0-6 0 - 6 /LPF    Mucus, UA  "Small/1+ (A) None Seen, Trace /HPF    Methodology Manual Light Microscopy    Pregnancy, Urine - Urine, Clean Catch    Collection Time: 03/19/18  7:38 AM   Result Value Ref Range    HCG, Urine QL Negative Negative   Sedimentation Rate    Collection Time: 03/19/18  9:51 AM   Result Value Ref Range    Sed Rate 106 (H) 0 - 20 mm/hr   Lactic Acid, Plasma    Collection Time: 03/19/18 11:34 AM   Result Value Ref Range    Lactate 1.1 0.5 - 2.0 mmol/L     Note: In addition to lab results from this visit, the labs listed above may include labs taken at another facility or during a different encounter within the last 24 hours. Please correlate lab times with ED admission and discharge times for further clarification of the services performed during this visit.    MRI Abdomen Pelvis Enterography   Preliminary Result   There is abnormal wall thickening again seen of the distal   transverse colon and descending colon with findings concerning for   multiloculated abscess with extraluminal air seen surrounding the   phlegmon and extensive inflammatory changes seen within the mesenteric   fat in the left flank. Consider CT scan for further evaluation       D:  03/19/2018   E:  03/19/2018                Vitals:    03/19/18 1000 03/19/18 1100 03/19/18 1230 03/19/18 1250   BP: 110/67 104/68 104/59 104/56   BP Location:    Left arm   Patient Position:    Sitting   Pulse: 79 73 83    Resp:    16   Temp:    98.8 °F (37.1 °C)   TempSrc:    Oral   SpO2: 95% 96% 99%    Weight:    74.9 kg (165 lb 3.2 oz)   Height:    172.7 cm (68\")     Medications   sodium chloride 0.9 % flush 10 mL (not administered)   Pharmacy to dose vancomycin (not administered)   sodium chloride 0.9 % infusion (not administered)   vancomycin (VANCOCIN) in iso-osmotic dextrose IVPB 1 g (premix) 200 mL (not administered)   !Vancomycin trough 3/21 @ 1230. Please hold dose until pharmacy evaluates level (not administered)   piperacillin-tazobactam (ZOSYN) 3.375 g/100 mL 0.9% " NS IVPB (mbp) (not administered)   sodium chloride 0.9 % bolus 1,000 mL (1,000 mL Intravenous New Bag 3/19/18 0735)   HYDROmorphone (DILAUDID) injection 1 mg (1 mg Intravenous Given 3/19/18 0735)   ondansetron (ZOFRAN) injection 4 mg (4 mg Intravenous Given 3/19/18 0735)   glucagon (human recombinant) (GLUCAGEN DIAGNOSTIC) injection 2 mg (2 mg Intramuscular Given 3/19/18 0850)   gadobenate dimeglumine (MULTIHANCE) injection 15 mL (15 mL Intravenous Given 3/19/18 0947)   diatrizoate meglumine-sodium (GASTROGRAFIN) 66-10 % solution 45 mL (45 mL Oral Given 3/19/18 0948)   piperacillin-tazobactam (ZOSYN) 4.5 g/100 mL 0.9% NS IVPB (mbp) (0 g Intravenous Stopped 3/19/18 1327)   vancomycin IVPB 1500 mg in 0.9% NaCl (Premix) 500 mL (1,500 mg Intravenous New Bag 3/19/18 1325)     ECG/EMG Results (last 24 hours)     ** No results found for the last 24 hours. **                    MDM    Final diagnoses:   Exacerbation of Crohn's disease with abscess            Paulo Blackman MD  03/19/18 5135

## 2018-03-19 NOTE — PLAN OF CARE
Problem: Patient Care Overview  Goal: Plan of Care Review  Outcome: Ongoing (interventions implemented as appropriate)    Goal: Individualization and Mutuality  Outcome: Ongoing (interventions implemented as appropriate)      Problem: Fluid Volume Deficit (Adult)  Goal: Identify Related Risk Factors and Signs and Symptoms  Outcome: Ongoing (interventions implemented as appropriate)

## 2018-03-19 NOTE — CONSULTS
Colon and Rectal [[facility]]    Patient Care Team:  Lori Mcmahon MD as PCP - General (Family Medicine)    Chief complaint septic Crohn's disease of the transverse and left colon    Subjective     HPI: 37-year-old female diagnosed about age 19 at the [facility] with Crohn's disease.  She responded beautifully to Remicade and was basically symptom free for 9 years.  She has had 5 fairly good bouts over the last few years.  She was put on Humira instead of Remicade and she said is never worked well.  Steroids temporize her symptoms but essentially when she goes off of them she naturally has a flare.  This particular flares started with left flank pain in the MRI shows an inflammatory process from the transverse down the left colon with likely extra colonic abscess and air.  No free air is seen.  Her last colonoscopy was 2015.  She has to be careful not to eat any raw fruits or vegetables.  She does occasionally get some nausea.  She got an iron infusion in January for hemoglobin of 6.5.  Currently 7.5.  She denies much bleeding so this is of concern.  Because of insurance issues, she has seen 3 or 4 GI docs over the years.    Review of Systems: Over the years she has had joint involvement of her ankles and knees.  Currently only a small digit on the left hand is involved.  Her weight is been stable but the steroids may have a role in that.  She has had fevers for a month.  In 2011 she had an anal fistula that was drained but never fully dispensed with.  This bothers her with a little drainage but nothing else.  She has not had any children.  No  issues.  10 point review is otherwise unremarkable.     History  Past Medical History:   Diagnosis Date   • Crohn disease    Anal fistula  History reviewed. No pertinent surgical history.  Family History   Problem Relation Age of Onset   • No Known Problems Mother    • No Known Problems Father    • GI problems Brother    • No Known Problems Maternal Grandmother    • No  "Known Problems Maternal Grandfather    • No Known Problems Paternal Grandmother    • No Known Problems Paternal Grandfather    No autoimmune problems anywhere her family.  Social History   Substance Use Topics   • Smoking status: Never Smoker   • Smokeless tobacco: Never Used   • Alcohol use No     Facility-Administered Medications Prior to Admission   Medication Dose Route Frequency Provider Last Rate Last Dose   • cyanocobalamin injection 1,000 mcg  1,000 mcg Intramuscular Once VIDAL Rendon         Prescriptions Prior to Admission   Medication Sig Dispense Refill Last Dose   • adalimumab (HUMIRA) 40 MG/0.8ML Prefilled Syringe Kit injection Inject 40 mg under the skin 1 (One) Time Per Week. On Tuesdays      • azaTHIOprine (IMURAN) 50 MG tablet Take 2 tablets by mouth Daily. 180 tablet 1 Taking   • Cyanocobalamin (B-12) 1000 MCG/ML kit Inject 1,000 mcg as directed 1 (One) Time Per Week. On Tuesdays      • ferrous sulfate 325 (65 FE) MG tablet Take 325 mg by mouth Daily With Breakfast.      • levocetirizine (XYZAL) 5 MG tablet Take 5 mg by mouth As Needed.   Taking   • mesalamine (LIALDA) 1.2 g EC tablet Take 2 tablets by mouth Daily With Breakfast. 180 tablet 1 Taking     Allergies:  Review of patient's allergies indicates no known allergies.    Objective     Vital Signs  Blood pressure 114/63, pulse 102, temperature (!) 103 °F (39.4 °C), temperature source Oral, resp. rate 16, height 172.7 cm (68\"), weight 74.9 kg (165 lb 3.2 oz), last menstrual period 03/01/2018, SpO2 95 %.    Physical Exam:Very warm to the touch but in no acute distress.  HEENT normal  Neck supple  Lungs clear  Heart regular with a pulsatile 102  Abdomen soft with absolutely no peritoneal signs.  Mildly tender left abdomen.  No masses felt.  Rectal shows a posterior midline quiescent fistula coming from the dentate line and going out just towards the verge.  No abscess is associated with this and no rectovaginal " problems.  Extremities warm with good pulses and no edema  Musculoskeletal and neurologic grossly within normal limits     Results Review:  Lab Results (last 24 hours)     Procedure Component Value Units Date/Time    Influenza A & B, RT PCR - Swab, Nasopharynx [[id number]] Collected:  03/19/18 1732    Specimen:  Swab from Nasopharynx Updated:  03/19/18 1742    Blood Culture - Blood, [[id number]] Collected:  03/19/18 1133    Specimen:  Blood from Arm, Right Updated:  03/19/18 1208    Blood Culture - Blood, [[id number]] Collected:  03/19/18 1134    Specimen:  Blood from Arm, Left Updated:  03/19/18 1208    Lactic Acid, Plasma [[id number]]  (Normal) Collected:  03/19/18 1134    Specimen:  Blood Updated:  03/19/18 1202     Lactate 1.1 mmol/L      Comment: Falsely depressed results may occur on samples drawn from patients receiving N-Acetylcysteine (NAC) or Metamizole.       Sedimentation Rate [[id number]]  (Abnormal) Collected:  03/19/18 0951    Specimen:  Blood Updated:  03/19/18 0959     Sed Rate 106 (H) mm/hr     [place]ett Draw [[id number]] Collected:  03/19/18 0701    Specimen:  Blood Updated:  03/19/18 0842    Narrative:       The following orders were created for panel order [place] Draw.  Procedure                               Abnormality         Status                     ---------                               -----------         ------                     Light Blue Top[[id number]]                                   Final result               Green Top (Gel)[[id number]]                                  Final result               Lavender Top[[id number]]                                     Final result               Gold Top - SST[[id number]]                                   Final result               Green Top (No Gel)[[id number]]                                                            Please view results for these tests on the individual orders.    Light Blue Top [[id number]] Collected:  03/19/18 0701    Specimen:   Blood Updated:  03/19/18 0816     Extra Tube hold for add-on     Comment: Auto resulted       Green Top (Gel) [[id number]] Collected:  03/19/18 0701    Specimen:  Blood Updated:  03/19/18 0816     Extra Tube Hold for add-ons.     Comment: Auto resulted.       Lavender Top [[id number]] Collected:  03/19/18 0701    Specimen:  Blood Updated:  03/19/18 0816     Extra Tube hold for add-on     Comment: Auto resulted       Gold Top - SST [[id number]] Collected:  03/19/18 0701    Specimen:  Blood Updated:  03/19/18 0816     Extra Tube Hold for add-ons.     Comment: Auto resulted.       C-reactive Protein [[id number]]  (Abnormal) Collected:  03/19/18 0729    Specimen:  Blood Updated:  03/19/18 0815     C-Reactive Protein 29.02 (H) mg/dL     Pregnancy, Urine - Urine, Clean Catch [[id number]]  (Normal) Collected:  03/19/18 0738    Specimen:  Urine from Urine, Clean Catch Updated:  03/19/18 0805     HCG, Urine QL Negative    Urinalysis With / Culture If Indicated - Urine, Clean Catch [[id number]]  (Abnormal) Collected:  03/19/18 0738    Specimen:  Urine from Urine, Clean Catch Updated:  03/19/18 0801     Color, UA Dark Yellow (A)     Appearance, UA Cloudy (A)     pH, UA 5.5     Specific Gravity, UA 1.024     Glucose, UA Negative     Ketones, UA Trace (A)     Bilirubin, UA Negative     Blood, UA Negative     Protein, UA 30 mg/dL (1+) (A)     Leuk Esterase, UA Small (1+) (A)     Nitrite, UA Negative     Urobilinogen, UA 1.0 E.U./dL    Urinalysis, Microscopic Only - Urine, Clean Catch [[id number]]  (Abnormal) Collected:  03/19/18 0738    Specimen:  Urine from Urine, Clean Catch Updated:  03/19/18 0801     RBC, UA 0-2 /HPF      WBC, UA 13-20 (A) /HPF      Bacteria, UA Trace /HPF      Squamous Epithelial Cells, UA 7-12 (A) /HPF      Hyaline Casts, UA 0-6 /LPF      Mucus, UA Small/1+ (A) /HPF      Methodology Manual Light Microscopy    CBC & Differential [[id number]] Collected:  03/19/18 0701    Specimen:  Blood Updated:  03/19/18 0749     Narrative:       The following orders were created for panel order CBC & Differential.  Procedure                               Abnormality         Status                     ---------                               -----------         ------                     Scan Slide[[id number]]                                       Final result               CBC Auto Differential[[id number]]        Abnormal            Final result                 Please view results for these tests on the individual orders.    Scan Slide [[id number]] Collected:  03/19/18 0701    Specimen:  Blood Updated:  03/19/18 0749     Hypochromia Mod/2+     Microcytes Mod/2+     WBC Morphology Normal     Platelet Morphology Normal    CBC Auto Differential [[id number]]  (Abnormal) Collected:  03/19/18 0701    Specimen:  Blood Updated:  03/19/18 0749     WBC 7.67 10*3/mm3      RBC 3.74 (L) 10*6/mm3      Hemoglobin 7.8 (L) g/dL      Hematocrit 27.0 (L) %      MCV 72.2 (L) fL      MCH 20.9 (L) pg      MCHC 28.9 (L) g/dL      RDW 22.6 (H) %      RDW-SD 57.7 (H) fl      MPV 8.1 fL      Platelets 1,075 (C) 10*3/mm3      Neutrophil % 63.6 %      Lymphocyte % 14.6 (L) %      Monocyte % 8.9 %      Eosinophil % 11.7 (H) %      Basophil % 0.8 %      Immature Grans % 0.4 %      Neutrophils, Absolute 4.88 10*3/mm3      Lymphocytes, Absolute 1.12 10*3/mm3      Monocytes, Absolute 0.68 10*3/mm3      Eosinophils, Absolute 0.90 (H) 10*3/mm3      Basophils, Absolute 0.06 10*3/mm3      Immature Grans, Absolute 0.03 10*3/mm3     Narrative:       Verified by repeat analysis.    Urine Culture - Urine, Urine, Clean Catch [[id number]] Collected:  03/19/18 0738    Specimen:  Urine from Urine, Clean Catch Updated:  03/19/18 0746    Comprehensive Metabolic Panel [[id number]]  (Abnormal) Collected:  03/19/18 0701    Specimen:  Blood Updated:  03/19/18 0729     Glucose 120 (H) mg/dL      BUN 10 mg/dL      Creatinine 0.80 mg/dL      Sodium 136 mmol/L      Potassium 3.2 (L) mmol/L       Chloride 100 mmol/L      CO2 26.0 mmol/L      Calcium 9.2 mg/dL      Total Protein 7.9 g/dL      Albumin 3.80 g/dL      ALT (SGPT) 10 U/L      AST (SGOT) 13 U/L      Alkaline Phosphatase 175 (H) U/L      Total Bilirubin 0.2 (L) mg/dL      eGFR Non African Amer 80 mL/min/1.73      Globulin 4.1 gm/dL      A/G Ratio 0.9 (L) g/dL      BUN/Creatinine Ratio 12.5     Anion Gap 10.0 mmol/L     Narrative:       [employer] Guidelines    Stage     Description        GFR  1         Normal or High     90+  2         Mild decrease      60-89  3         Moderate decrease  30-59  4         Severe decrease    15-29  5         Kidney failure     <15    Lipase [[id number]]  (Normal) Collected:  03/19/18 0701    Specimen:  Blood Updated:  03/19/18 0729     Lipase 25 U/L          Imaging Results (last 24 hours)     Procedure Component Value Units Date/Time    MRI Abdomen Pelvis Enterography [[id number]] Collected:  03/19/18 1134     Updated:  03/19/18 1619    Narrative:       EXAMINATION: MRI ABDOMEN AND PELVIS W/WO CONTRAST ENTEROGRAPHY-      INDICATION: Crohn disease suspected, severe abdominal pain  (fever/vomiting/leukocytosis).     TECHNIQUE: Routine MR imaging was obtained of the abdomen and pelvis pre  and post administration of oral and intravenous contrast according to  the enterography protocol.        COMPARISON: 01/09/2018.     FINDINGS: There is no significant change seen in the appearance of the  distal transverse and descending colon when compared to the prior study  of 01/09/2018. There is abnormal wall thickening identified of the colon  with pericolonic stranding. There is edema identified throughout the  mesentery surrounding the descending colon. There are several areas  concerning for extraluminal air and microperforation in the inflammatory  changes within the left flank. There is surrounding phlegmon and soft  tissue. Findings are concerning for multiloculated abscess with some  small pockets of  fluid identified. Consider CT scan for further  evaluation of the changes within the left flank if clinically indicated.  The remainder of the bowel reveals no other gross abnormality. The liver  is homogeneous in appearance. No stones seen in the gallbladder. There  is distention of the stomach. Pancreas is homogeneous. Spleen is  unremarkable. The kidneys and adrenal glands within normal limits. No  abdominal or retroperitoneal lymphadenopathy.     PELVIS: The pelvic organs are unremarkable. The pelvic portions of the  gastrointestinal tract are within normal limits. No pelvic adenopathy.  No free fluid or free air. Bony structures are unremarkable. No abnormal  mass or fluid collection is identified.           Impression:       There is abnormal wall thickening again seen of the distal  transverse colon and descending colon with findings concerning for  multiloculated abscess with extraluminal air seen surrounding the  phlegmon and extensive inflammatory changes seen within the mesenteric  fat in the left flank. Consider CT scan for further evaluation     D:  03/19/2018  E:  03/19/2018        This report was finalized on 3/19/2018 4:16 PM by Dr. Erika Hoover MD.              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  Iron deficiency anemia without obvious bleeding  No colonoscopy in 3 years    Recommendation: Given her immune deficiency and sepsis and multiple courses of antibiotics, Diflucan will be added since a yeast infection is likely.  Will hold on steroids and give the antibiotics chance to work.  A gentle Colonoscopy probably on Wednesday to see what we are dealing with.  She stopped her Remicade purely because she was doing well so she probably does not have  antibiotic and we can probably restart that. Entyvio would be another excellent choice, although it does not appear to work as quickly as Remicade. I think it is futile to continue with Humira especially since she is on a weekly dosage.  I've never been impressed with Humira with this type of Crohn's.  CT scan is pending but certainly she is at risk for an urgent operation and a diversion.    I discussed the patients findings and my recommendations with patient and the hospitalist.     Mahin Abebe MD  03/19/18  6:18 PM

## 2018-03-19 NOTE — CONSULTS
[facility]    Referring Provider: Lori Mcmahon MD, Rayray Arciniega MD    Reason for Consultation: crohns    Chief complaint ;Fever, abd pain    History of present illness:  Sultana Siegel is a 37 y.o. female who is admitted with abdominal pain and fever.  Patient has  known history of Crohn's disease for over 20 ys.  She was hospitalized in January with such.  At that time CT scan showed significant stranding in the descending and transverse colon.  She was started on a steroids.  She improved.  After tapering her steroids she became worse.  At that time she had no antibodies to Humira.  She was seen in the GI office March 13.  She was scheduled for a colonoscopy and MRI to look for infection.   Her fever has persisted and her abdominal began  became worse therefore she presented to the emergency room.. MRI today shows wall thickening in the distal transverse and descending colon with multiloculated abscess with extraluminal air seen surrounding phlegmon and extensive inflammatory changes seen in the mesentery fat the left flank. .     She denies diarrhea.  Her weight has been maintained.  She has had a sore throat.  She works as a  .   Allergies:  Review of patient's allergies indicates no known allergies.    Scheduled Meds:    [START ON 3/21/2018] Pharmacy Consult  Does not apply Once   piperacillin-tazobactam 3.375 g Intravenous Q8H   [START ON 3/20/2018] vancomycin 1,000 mg Intravenous Q12H        Infusions:    Pharmacy to dose vancomycin     sodium chloride 125 mL/hr Last Rate: 125 mL/hr (03/19/18 1528)       PRN Meds:  Pharmacy to dose vancomycin  •  sodium chloride    Home Meds:  Facility-Administered Medications Prior to Admission   Medication Dose Route Frequency Provider Last Rate Last Dose   • cyanocobalamin injection 1,000 mcg  1,000 mcg Intramuscular Once VIDAL Rendon         Prescriptions Prior to Admission   Medication Sig Dispense Refill Last Dose   •  "adalimumab (HUMIRA) 40 MG/0.8ML Prefilled Syringe Kit injection Inject 40 mg under the skin 1 (One) Time Per Week. On Tuesdays      • azaTHIOprine (IMURAN) 50 MG tablet Take 2 tablets by mouth Daily. 180 tablet 1 Taking   • Cyanocobalamin (B-12) 1000 MCG/ML kit Inject 1,000 mcg as directed 1 (One) Time Per Week. On Tuesdays      • ferrous sulfate 325 (65 FE) MG tablet Take 325 mg by mouth Daily With Breakfast.      • levocetirizine (XYZAL) 5 MG tablet Take 5 mg by mouth As Needed.   Taking   • mesalamine (LIALDA) 1.2 g EC tablet Take 2 tablets by mouth Daily With Breakfast. 180 tablet 1 Taking       ROS: Review of Systems   Constitutional: Positive for chills, fatigue and fever. Negative for activity change, appetite change and unexpected weight change.   HENT: Positive for sore throat.    Respiratory: Negative for shortness of breath.    Cardiovascular: Negative for chest pain.   Gastrointestinal: Positive for abdominal distention and abdominal pain. Negative for constipation and diarrhea.   Genitourinary: Negative for dysuria.   Skin: Negative for color change.   Hematological: Negative for adenopathy.       PAST MED HX: Pt  has a past medical history of Crohn disease.  PAST SURG HX: Pt  has no past surgical history on file.  FAM HX: family history includes GI problems in her brother; No Known Problems in her father, maternal grandfather, maternal grandmother, mother, paternal grandfather, and paternal grandmother.  SOC HX: Pt  reports that she has never smoked. She has never used smokeless tobacco. She reports that she does not drink alcohol or use drugs.    /56 (BP Location: Left arm, Patient Position: Sitting)   Pulse 96   Temp 99.9 °F (37.7 °C) (Oral)   Resp 16   Ht 172.7 cm (68\")   Wt 74.9 kg (165 lb 3.2 oz)   LMP 03/01/2018 (Exact Date)   SpO2 95%   BMI 25.12 kg/m²     Physical Exam  Wt Readings from Last 3 Encounters:   03/19/18 74.9 kg (165 lb 3.2 oz)   03/13/18 75.3 kg (166 lb)   02/21/18 " 78.4 kg (172 lb 12.8 oz)   ,body mass index is 25.12 kg/m².    General Well developed; well nourished; no acute distress.   ENT Good dentition.  Oral mucosa pink & moist without thrush or lesions.    Neck Neck supple; trachea midline. No thyromegaly  Resp CTA; no rhonchi, rales, or wheezes.  Respiration effort normal  CV RRR; ; no M/R/G. No lower extremity edema  GI Abd soft, mild diffuse tenderness.  No guarding or rebound, ND, normal active bowel sounds.  No HSM.  No abd hernia  Skin No rash; no lesions; no bruises.  Skin turgor normal  Musc No clubbing; no cyanosis.    Psych Oriented to time, place, and person.  Appropriate affect      Results Review:   I reviewed the patient's new clinical results.  I reviewed the patient's new imaging results and agree with the interpretation.    Lab Results   Component Value Date    WBC 7.67 03/19/2018    HGB 7.8 (L) 03/19/2018    HCT 27.0 (L) 03/19/2018    MCV 72.2 (L) 03/19/2018    PLT 1,075 (C) 03/19/2018       Lab Results   Component Value Date    GLUCOSE 120 (H) 03/19/2018    BUN 10 03/19/2018    CREATININE 0.80 03/19/2018    EGFRIFNONA 80 03/19/2018    BCR 12.5 03/19/2018    CO2 26.0 03/19/2018    CALCIUM 9.2 03/19/2018    ALBUMIN 3.80 03/19/2018    LABIL2 0.9 (L) 03/19/2018    AST 13 03/19/2018    ALT 10 03/19/2018       ASSESSMENTS/PLANS    1 Crohn's enterocolitis with abscess and phlegmon formation    Will hold Humira  our at present.  Hold off on steroids.  Begin antibiotics.  Surgical consult .  Hopefully will cool down with abx.  WIll proceed with CT to evaluate left flank. Would consider he a failure to Himira.  After infection has resolved will start Simponi  Will check flu swab        I discussed the patients findings and my recommendations with patient    Oscar Alfaro MD  03/19/18  3:30 PM

## 2018-03-20 LAB
ALBUMIN SERPL-MCNC: 3.2 G/DL (ref 3.2–4.8)
ALBUMIN/GLOB SERPL: 1 G/DL (ref 1.5–2.5)
ALP SERPL-CCNC: 167 U/L (ref 25–100)
ALT SERPL W P-5'-P-CCNC: 16 U/L (ref 7–40)
ANION GAP SERPL CALCULATED.3IONS-SCNC: 14 MMOL/L (ref 3–11)
AST SERPL-CCNC: 19 U/L (ref 0–33)
BASOPHILS # BLD AUTO: 0.05 10*3/MM3 (ref 0–0.2)
BASOPHILS NFR BLD AUTO: 0.6 % (ref 0–1)
BILIRUB SERPL-MCNC: 0.2 MG/DL (ref 0.3–1.2)
BUN BLD-MCNC: 8 MG/DL (ref 9–23)
BUN/CREAT SERPL: 11.4 (ref 7–25)
BURR CELLS BLD QL SMEAR: NORMAL
C DIFF TOX GENS STL QL NAA+PROBE: NOT DETECTED
CALCIUM SPEC-SCNC: 8.6 MG/DL (ref 8.7–10.4)
CHLORIDE SERPL-SCNC: 103 MMOL/L (ref 99–109)
CO2 SERPL-SCNC: 20 MMOL/L (ref 20–31)
CREAT BLD-MCNC: 0.7 MG/DL (ref 0.6–1.3)
D-LACTATE SERPL-SCNC: 0.7 MMOL/L (ref 0.5–2)
DEPRECATED RDW RBC AUTO: 59 FL (ref 37–54)
EOSINOPHIL # BLD AUTO: 0.86 10*3/MM3 (ref 0–0.3)
EOSINOPHIL NFR BLD AUTO: 10.4 % (ref 0–3)
ERYTHROCYTE [DISTWIDTH] IN BLOOD BY AUTOMATED COUNT: 23 % (ref 11.3–14.5)
GFR SERPL CREATININE-BSD FRML MDRD: 94 ML/MIN/1.73
GGT SERPL-CCNC: 50 U/L (ref 0–37)
GLOBULIN UR ELPH-MCNC: 3.3 GM/DL
GLUCOSE BLD-MCNC: 86 MG/DL (ref 70–100)
HCT VFR BLD AUTO: 23.2 % (ref 34.5–44)
HGB BLD-MCNC: 6.6 G/DL (ref 11.5–15.5)
HYPOCHROMIA BLD QL: NORMAL
IMM GRANULOCYTES # BLD: 0.03 10*3/MM3 (ref 0–0.03)
IMM GRANULOCYTES NFR BLD: 0.4 % (ref 0–0.6)
IRON 24H UR-MRATE: 10 MCG/DL (ref 50–175)
IRON SATN MFR SERPL: 4 % (ref 15–50)
LYMPHOCYTES # BLD AUTO: 1.04 10*3/MM3 (ref 0.6–4.8)
LYMPHOCYTES NFR BLD AUTO: 12.5 % (ref 24–44)
MCH RBC QN AUTO: 20.6 PG (ref 27–31)
MCHC RBC AUTO-ENTMCNC: 28.4 G/DL (ref 32–36)
MCV RBC AUTO: 72.3 FL (ref 80–99)
MICROCYTES BLD QL: NORMAL
MONOCYTES # BLD AUTO: 0.71 10*3/MM3 (ref 0–1)
MONOCYTES NFR BLD AUTO: 8.6 % (ref 0–12)
NEUTROPHILS # BLD AUTO: 5.6 10*3/MM3 (ref 1.5–8.3)
NEUTROPHILS NFR BLD AUTO: 67.5 % (ref 41–71)
OVALOCYTES BLD QL SMEAR: NORMAL
PLAT MORPH BLD: NORMAL
PLATELET # BLD AUTO: 948 10*3/MM3 (ref 150–450)
PMV BLD AUTO: 8 FL (ref 6–12)
POTASSIUM BLD-SCNC: 3.2 MMOL/L (ref 3.5–5.5)
PROT SERPL-MCNC: 6.5 G/DL (ref 5.7–8.2)
RBC # BLD AUTO: 3.21 10*6/MM3 (ref 3.89–5.14)
SODIUM BLD-SCNC: 137 MMOL/L (ref 132–146)
TIBC SERPL-MCNC: 229 MCG/DL (ref 250–450)
WBC MORPH BLD: NORMAL
WBC NRBC COR # BLD: 8.29 10*3/MM3 (ref 3.5–10.8)

## 2018-03-20 PROCEDURE — 80053 COMPREHEN METABOLIC PANEL: CPT | Performed by: FAMILY MEDICINE

## 2018-03-20 PROCEDURE — 25010000002 FLUCONAZOLE PER 200 MG: Performed by: COLON & RECTAL SURGERY

## 2018-03-20 PROCEDURE — 85007 BL SMEAR W/DIFF WBC COUNT: CPT | Performed by: FAMILY MEDICINE

## 2018-03-20 PROCEDURE — 87449 NOS EACH ORGANISM AG IA: CPT | Performed by: INTERNAL MEDICINE

## 2018-03-20 PROCEDURE — 25010000002 PIPERACILLIN-TAZOBACTAM: Performed by: FAMILY MEDICINE

## 2018-03-20 PROCEDURE — P9016 RBC LEUKOCYTES REDUCED: HCPCS

## 2018-03-20 PROCEDURE — 85007 BL SMEAR W/DIFF WBC COUNT: CPT | Performed by: NURSE PRACTITIONER

## 2018-03-20 PROCEDURE — 25010000002 VANCOMYCIN PER 500 MG

## 2018-03-20 PROCEDURE — 87046 STOOL CULTR AEROBIC BACT EA: CPT | Performed by: COLON & RECTAL SURGERY

## 2018-03-20 PROCEDURE — 82977 ASSAY OF GGT: CPT | Performed by: NURSE PRACTITIONER

## 2018-03-20 PROCEDURE — 82746 ASSAY OF FOLIC ACID SERUM: CPT | Performed by: NURSE PRACTITIONER

## 2018-03-20 PROCEDURE — 99233 SBSQ HOSP IP/OBS HIGH 50: CPT | Performed by: FAMILY MEDICINE

## 2018-03-20 PROCEDURE — 99232 SBSQ HOSP IP/OBS MODERATE 35: CPT | Performed by: NURSE PRACTITIONER

## 2018-03-20 PROCEDURE — 25010000002 LINEZOLID 600 MG/300ML SOLUTION: Performed by: INTERNAL MEDICINE

## 2018-03-20 PROCEDURE — 87493 C DIFF AMPLIFIED PROBE: CPT | Performed by: INTERNAL MEDICINE

## 2018-03-20 PROCEDURE — 83550 IRON BINDING TEST: CPT | Performed by: NURSE PRACTITIONER

## 2018-03-20 PROCEDURE — 85060 BLOOD SMEAR INTERPRETATION: CPT | Performed by: NURSE PRACTITIONER

## 2018-03-20 PROCEDURE — 25010000002 ONDANSETRON PER 1 MG: Performed by: NURSE PRACTITIONER

## 2018-03-20 PROCEDURE — 87899 AGENT NOS ASSAY W/OPTIC: CPT | Performed by: INTERNAL MEDICINE

## 2018-03-20 PROCEDURE — 87209 SMEAR COMPLEX STAIN: CPT | Performed by: INTERNAL MEDICINE

## 2018-03-20 PROCEDURE — 87177 OVA AND PARASITES SMEARS: CPT | Performed by: INTERNAL MEDICINE

## 2018-03-20 PROCEDURE — 83605 ASSAY OF LACTIC ACID: CPT | Performed by: FAMILY MEDICINE

## 2018-03-20 PROCEDURE — 82728 ASSAY OF FERRITIN: CPT | Performed by: NURSE PRACTITIONER

## 2018-03-20 PROCEDURE — 83540 ASSAY OF IRON: CPT | Performed by: NURSE PRACTITIONER

## 2018-03-20 PROCEDURE — 85025 COMPLETE CBC W/AUTO DIFF WBC: CPT | Performed by: FAMILY MEDICINE

## 2018-03-20 PROCEDURE — 94799 UNLISTED PULMONARY SVC/PX: CPT

## 2018-03-20 PROCEDURE — 36430 TRANSFUSION BLD/BLD COMPNT: CPT

## 2018-03-20 PROCEDURE — 86900 BLOOD TYPING SEROLOGIC ABO: CPT

## 2018-03-20 PROCEDURE — 87045 FECES CULTURE AEROBIC BACT: CPT | Performed by: COLON & RECTAL SURGERY

## 2018-03-20 PROCEDURE — 82607 VITAMIN B-12: CPT | Performed by: NURSE PRACTITIONER

## 2018-03-20 PROCEDURE — 25010000002 HYDROMORPHONE PER 4 MG: Performed by: FAMILY MEDICINE

## 2018-03-20 PROCEDURE — 25010000002 NA FERRIC GLUC CPLX PER 12.5 MG: Performed by: NURSE PRACTITIONER

## 2018-03-20 RX ORDER — POTASSIUM CHLORIDE 750 MG/1
40 CAPSULE, EXTENDED RELEASE ORAL AS NEEDED
Status: DISCONTINUED | OUTPATIENT
Start: 2018-03-20 | End: 2018-03-23 | Stop reason: HOSPADM

## 2018-03-20 RX ORDER — SODIUM CHLORIDE, SODIUM LACTATE, POTASSIUM CHLORIDE, CALCIUM CHLORIDE 600; 310; 30; 20 MG/100ML; MG/100ML; MG/100ML; MG/100ML
9 INJECTION, SOLUTION INTRAVENOUS CONTINUOUS
Status: CANCELLED | OUTPATIENT
Start: 2018-03-20

## 2018-03-20 RX ORDER — MAGNESIUM CARB/ALUMINUM HYDROX 105-160MG
296 TABLET,CHEWABLE ORAL ONCE
Status: COMPLETED | OUTPATIENT
Start: 2018-03-20 | End: 2018-03-20

## 2018-03-20 RX ORDER — SODIUM CHLORIDE 0.9 % (FLUSH) 0.9 %
1-10 SYRINGE (ML) INJECTION AS NEEDED
Status: CANCELLED | OUTPATIENT
Start: 2018-03-20

## 2018-03-20 RX ORDER — ONDANSETRON 2 MG/ML
4 INJECTION INTRAMUSCULAR; INTRAVENOUS EVERY 6 HOURS PRN
Status: DISCONTINUED | OUTPATIENT
Start: 2018-03-20 | End: 2018-03-23 | Stop reason: HOSPADM

## 2018-03-20 RX ORDER — LIDOCAINE HYDROCHLORIDE 10 MG/ML
0.5 INJECTION, SOLUTION EPIDURAL; INFILTRATION; INTRACAUDAL; PERINEURAL ONCE AS NEEDED
Status: CANCELLED | OUTPATIENT
Start: 2018-03-20

## 2018-03-20 RX ORDER — POTASSIUM CHLORIDE 1.5 G/1.77G
40 POWDER, FOR SOLUTION ORAL AS NEEDED
Status: DISCONTINUED | OUTPATIENT
Start: 2018-03-20 | End: 2018-03-23 | Stop reason: HOSPADM

## 2018-03-20 RX ORDER — LINEZOLID 2 MG/ML
600 INJECTION, SOLUTION INTRAVENOUS EVERY 12 HOURS
Status: DISCONTINUED | OUTPATIENT
Start: 2018-03-20 | End: 2018-03-22

## 2018-03-20 RX ORDER — FAMOTIDINE 20 MG/1
20 TABLET, FILM COATED ORAL ONCE
Status: CANCELLED | OUTPATIENT
Start: 2018-03-20 | End: 2018-03-20

## 2018-03-20 RX ADMIN — HYDROMORPHONE HYDROCHLORIDE 0.5 MG: 10 INJECTION INTRAMUSCULAR; INTRAVENOUS; SUBCUTANEOUS at 09:03

## 2018-03-20 RX ADMIN — POTASSIUM CHLORIDE 40 MEQ: 750 CAPSULE, EXTENDED RELEASE ORAL at 10:37

## 2018-03-20 RX ADMIN — Medication 296 ML: at 20:20

## 2018-03-20 RX ADMIN — PIPERACILLIN SODIUM,TAZOBACTAM SODIUM 3.38 G: 3; .375 INJECTION, POWDER, FOR SOLUTION INTRAVENOUS at 08:58

## 2018-03-20 RX ADMIN — ONDANSETRON 4 MG: 2 INJECTION INTRAMUSCULAR; INTRAVENOUS at 22:14

## 2018-03-20 RX ADMIN — VANCOMYCIN HYDROCHLORIDE 1000 MG: 1 INJECTION, SOLUTION INTRAVENOUS at 04:38

## 2018-03-20 RX ADMIN — POTASSIUM CHLORIDE 40 MEQ: 750 CAPSULE, EXTENDED RELEASE ORAL at 15:09

## 2018-03-20 RX ADMIN — PIPERACILLIN SODIUM,TAZOBACTAM SODIUM 3.38 G: 3; .375 INJECTION, POWDER, FOR SOLUTION INTRAVENOUS at 04:38

## 2018-03-20 RX ADMIN — HYDROMORPHONE HYDROCHLORIDE 0.5 MG: 10 INJECTION INTRAMUSCULAR; INTRAVENOUS; SUBCUTANEOUS at 15:09

## 2018-03-20 RX ADMIN — SODIUM CHLORIDE 125 MG: 9 INJECTION, SOLUTION INTRAVENOUS at 20:20

## 2018-03-20 RX ADMIN — LINEZOLID 600 MG: 600 INJECTION, SOLUTION INTRAVENOUS at 12:08

## 2018-03-20 RX ADMIN — DOXYCYCLINE 100 MG: 100 INJECTION, POWDER, LYOPHILIZED, FOR SOLUTION INTRAVENOUS at 20:20

## 2018-03-20 RX ADMIN — ACETAMINOPHEN 650 MG: 325 TABLET ORAL at 20:35

## 2018-03-20 RX ADMIN — POLYETHYLENE GLYCOL 3350 17 G: 17 POWDER, FOR SOLUTION ORAL at 20:18

## 2018-03-20 RX ADMIN — PIPERACILLIN SODIUM,TAZOBACTAM SODIUM 3.38 G: 3; .375 INJECTION, POWDER, FOR SOLUTION INTRAVENOUS at 17:45

## 2018-03-20 RX ADMIN — SODIUM CHLORIDE 125 ML/HR: 9 INJECTION, SOLUTION INTRAVENOUS at 06:16

## 2018-03-20 RX ADMIN — FLUCONAZOLE 400 MG: 2 INJECTION, SOLUTION INTRAVENOUS at 08:58

## 2018-03-20 RX ADMIN — DOXYCYCLINE 100 MG: 100 INJECTION, POWDER, LYOPHILIZED, FOR SOLUTION INTRAVENOUS at 12:08

## 2018-03-20 NOTE — PROGRESS NOTES
[facility] [place]          INPATIENT Progress NOTE  Patient: JACKSON QUESADA : 1980   37 y.o. female Date of Service:18  Admission Info: 3/19/2018  6:50 AM for Exacerbation of Crohn's disease with abscess [K50.914]  Hospital Day:  LOS: 1 day   Room S451/1  Chief Complaint: abd pain  Assessment/Plan                                             ASSESSMENT & PLANS       Crohn's enterocolitis with abscess and phlegmon formation  ·  Will hold Humira at present.    · Hold off on steroids.   · Continue antibiotics. Appreciate ID's recommendation   · Dr Alfaro will discuss w/ Dr Abebe (surgery), regarding plan of colonoscopy timing and treatment choice for Crohns     Acute Diarrhea  O&P and C. difficile PCR results pendng    Acute on chronic anemia d/t Crohn's flare.   · Serial H/H. Transfuse per protocol. Get Crohn's under controlled.  · Check peripheral blood smear (pt also has thrombocytosis even when pt does not have Crohn’s flare  · Check B12, Folate, iron profile, methylmalonic acid  · Ferrlecit IV Iron replacement.  Orders placed    DISCUSSION: The patient’s case was discussed with Dr. Alfaro,, and we collaborated pt’s plan of care.The above plan was delineated in details with patient.  All questions and concerns were answered.  Patient is also given contact information.      Subjective                                                           SUBJECTIVE   Interval History:   History taken from: patient   ++Intermittent abd pain (generalized periumbilical mostly)  Large bm watery this morning, but no blood  Nausea mild. No vomiting    ROS: Const: Denies fever, chills, or night sweat.  ENT: Denies sore throat, epistaxis, or sinusitis  Objective                                                           OBJECTIVE   Allergy: Pt has No Known Allergies.  Scheduled Meds:  doxycycline 100 mg Intravenous Q12H   fluconazole 400 mg Intravenous Daily   Linezolid 600 mg  Intravenous Q12H   piperacillin-tazobactam 3.375 g Intravenous Q8H   polyethylene glycol 17 g Oral BID      Infusions:     PRN Meds:  •  acetaminophen  •  HYDROmorphone  •  ondansetron  •  potassium chloride  •  potassium chloride  •  sodium chloride  LABS:     Results from last 7 days  Lab Units 03/20/18  0517 03/19/18  0701   WBC 10*3/mm3 8.29 7.67   HEMOGLOBIN g/dL 6.6* 7.8*   HEMATOCRIT % 23.2* 27.0*   MCV fL 72.3* 72.2*   MCHC g/dL 28.4* 28.9*   PLATELETS 10*3/mm3 948* 1,075*     Transferrin or Iron Saturation % (Normal 20 - 50 %)  Lab Results   Component Value Date    LABIRON 1 (L) 01/17/2018     TIBC (Normal 250 - 450 ug/dL)  Lab Results   Component Value Date    TIBC 343 01/17/2018     Serum Iron (Normal 38 - 169 ug/dL)  Lab Results   Component Value Date    IRON 4 (L) 01/17/2018     Ferritin (Normal 20.00 - 291.00 ng/mL)  Lab Results   Component Value Date    FERRITIN 34.00 01/17/2018       Results from last 7 days  Lab Units 03/20/18  0517 03/19/18  0701   BUN mg/dL 8* 10   CREATININE mg/dL 0.70 0.80   AST (SGOT) U/L 19 13   ALT (SGPT) U/L 16 10   ALK PHOS U/L 167* 175*   BILIRUBIN mg/dL 0.2* 0.2*   ALBUMIN g/dL 3.20 3.80   SODIUM mmol/L 137 136   POTASSIUM mmol/L 3.2* 3.2*   CO2 mmol/L 20.0 26.0   GLUCOSE mg/dL 86 120*   ANION GAP mmol/L 14.0* 10.0       Results from last 7 days  Lab Units 03/19/18  0701   LIPASE U/L 25     RADIOLOGY:  Imaging Results (last 72 hours)     Procedure Component Value Units Date/Time    CT Abdomen Pelvis With & Without Contrast [[id number]] Collected:  03/19/18 1602     Updated:  03/20/18 0048    Narrative:       EXAM:    CT Abdomen and Pelvis Without and With Intravenous Contrast    EXAM DATE/TIME:    3/19/2018 4:02 PM    CLINICAL HISTORY:    37 years old, female; Crohn's disease, unspecified, with abscess; Pain;   Abdominal pain; Flank; Left; Additional info: Abn findings on diagnostic   imaging of body structures    TECHNIQUE:    Axial computed tomography images of the  abdomen and pelvis without and with   intravenous contrast.  Delayed images through the abdomen and pelvis were   performed.    Oral contrast was administered.    Coronal reformatted images were created and reviewed.    All CT scans at this facility use one or more dose reduction techniques,   viz.: automated exposure control; ma/kV adjustment per patient size (including   targeted exams where dose is matched to indication; i.e. head); or iterative   reconstruction technique.    CONTRAST:    99 mL of isovue 300 administered intravenously.    COMPARISON:    CT ABDOMEN PELVIS W CONTRAST 2018-01-09 14:26    FINDINGS:    Lower thorax:  Mild dependent atelectasis posterior lungs, increased.    Possible trace pleural effusions, slightly greater on the LEFT, new.     ABDOMEN:    Liver:  Unremarkable as visualized.  No mass.    Gallbladder and bile ducts:  Gallbladder unremarkable.  No calcified stones.    No ductal dilation.    Pancreas:  Pancreas unremarkable as visualized.  No ductal dilation.    Spleen:  Unremarkable as visualized.  No splenomegaly.    Adrenals:  Adrenals unremarkable.    Kidneys and ureters:  No hydronephrosis or hydroureter.  No definite renal   cortical solid lesion.  Mild LEFT perinephric fluid greatest inferiorly, likely   reactive secondary to the adjacent mesenteric inflammation/phlegmon.    Stomach and bowel:  No evidence of intestinal obstruction.  Oral contrast   demonstrated throughout the small bowel and throughout the colon.  No evidence   of small bowel wall thickening.    Diffuse wall thickening distal transverse colon and sigmoid colon with the   extent of the wall thickening decreased slightly since previous study with   slightly less of the transverse colon and descending colon affected.    However, there is increasing phlegmon including several small foci of   extraluminal air medial to the descending colon, overall measuring   approximately at least 6 cm in AP extent, 4 cm in  greatest transverse extent,   and extending for at least 9.5 cm in craniocaudal direction.  There are several   areas which suggest early fluid collection although there is no walled off   fluid collection with enhancing rim on post contrast or delayed images.  The   largest possible fluid collection measures approximately 3 cm in greatest   diameter and is at the inferior aspect of the inflammation/phlegmon.    Oral contrast is demonstrated throughout the abnormal colon with no   extravasation of oral contrast demonstrated.  There was an area of likely   extraluminal abscess formation medial to the mid descending colon on the prior   study but the overall extent has increased significantly.    The remainder of the colon appears normal with no other area of wall   thickening.    Appendix:  A normal appendix is identified.  There is no evidence of   distention or periappendiceal inflammation to suggest appendicitis.     PELVIS:    Bladder:  Urinary bladder unremarkable with no calcification or wall   thickening.      Reproductive:  Likely physiologic ovarian cysts/follicles.     ABDOMEN and PELVIS:    Intraperitoneal space:  See above.    Bones/joints:  No acute bone abnormality.    Soft tissues:  Small fat-containing umbilical hernia.    Vasculature:  Unremarkable as visualized.  No abdominal aortic aneurysm.    Lymph nodes:  No significant lymphadenopathy.      Impression:         Diffusely increasing inflammation/phlegmon medial to the descending colon with   extraluminal air and several small areas of likely early fluid/abscess   formation although none are walled off and definitely localized.    Diffuse nonspecific colitis distal transverse and descending colon with the   overall extent of the inflammation decreasing slightly since 1/9/2018.    No evidence of extravasation of oral contrast diagnostic for fistula.    Mild amount of LEFT perinephric fluid likely reactive as discussed above.  No   abnormality in  the LEFT kidney demonstrated.    Possible trace bilateral pleural effusions and increasing adjacent atelectasis   and/or infiltrates, all new since 01/09/2018.    Additional nonacute findings as noted, radiographically stable.    THIS DOCUMENT HAS BEEN ELECTRONICALLY SIGNED BY VICTORINO LAUREN MD    MRI Abdomen Pelvis Enterography [[id number]] Collected:  03/19/18 1134     Updated:  03/19/18 9991    Narrative:       EXAMINATION: MRI ABDOMEN AND PELVIS W/WO CONTRAST ENTEROGRAPHY-      INDICATION: Crohn disease suspected, severe abdominal pain  (fever/vomiting/leukocytosis).     TECHNIQUE: Routine MR imaging was obtained of the abdomen and pelvis pre  and post administration of oral and intravenous contrast according to  the enterography protocol.        COMPARISON: 01/09/2018.     FINDINGS: There is no significant change seen in the appearance of the  distal transverse and descending colon when compared to the prior study  of 01/09/2018. There is abnormal wall thickening identified of the colon  with pericolonic stranding. There is edema identified throughout the  mesentery surrounding the descending colon. There are several areas  concerning for extraluminal air and microperforation in the inflammatory  changes within the left flank. There is surrounding phlegmon and soft  tissue. Findings are concerning for multiloculated abscess with some  small pockets of fluid identified. Consider CT scan for further  evaluation of the changes within the left flank if clinically indicated.  The remainder of the bowel reveals no other gross abnormality. The liver  is homogeneous in appearance. No stones seen in the gallbladder. There  is distention of the stomach. Pancreas is homogeneous. Spleen is  unremarkable. The kidneys and adrenal glands within normal limits. No  abdominal or retroperitoneal lymphadenopathy.     PELVIS: The pelvic organs are unremarkable. The pelvic portions of the  gastrointestinal tract are within normal  "limits. No pelvic adenopathy.  No free fluid or free air. Bony structures are unremarkable. No abnormal  mass or fluid collection is identified.           Impression:       There is abnormal wall thickening again seen of the distal  transverse colon and descending colon with findings concerning for  multiloculated abscess with extraluminal air seen surrounding the  phlegmon and extensive inflammatory changes seen within the mesenteric  fat in the left flank. Consider CT scan for further evaluation     D:  03/19/2018  E:  03/19/2018        This report was finalized on 3/19/2018 4:16 PM by Dr. Erika Hoover MD.           PHYSICAL EXAM:Vital Signs Min/Max for last 24 hours  Temp  Min: 98 °F (36.7 °C)  Max: 100.1 °F (37.8 °C)   BP  Min: 102/62  Max: 127/76   Pulse  Min: 71  Max: 98   Resp  Min: 16  Max: 22   SpO2  Min: 94 %  Max: 98 %   Body mass index is 24.77 kg/m².   Flowsheet Rows    Flowsheet Row First Filed Value   Admission Height 172.7 cm (68\") Documented at 03/19/2018 0647   Admission Weight 72.6 kg (160 lb) Documented at 03/19/2018 0647        Wt Readings from Last 5 Encounters:   03/20/18 73.9 kg (162 lb 14.7 oz)   03/13/18 75.3 kg (166 lb)   02/21/18 78.4 kg (172 lb 12.8 oz)   01/29/18 76.7 kg (169 lb)   01/18/18 78.1 kg (172 lb 3.2 oz)       Intake/Output Summary (Last 24 hours) at 03/20/18 1829  Last data filed at 03/20/18 1745   Gross per 24 hour   Intake          3730.83 ml   Output                0 ml   Net          3730.83 ml     ENT Good dentition.  Oral mucosa pink & moist without thrush or lesions.    GI  Abd soft, NT, ND, normal active bowel sounds.  No HSM.  No abd hernia         Patito DRAKE  [facility]--Gastroenterology  [phone]    "

## 2018-03-20 NOTE — PROGRESS NOTES
[facility]  PROGRESS NOTE    Patient Name: Sultana Siegel  : 1980  MRN: [id number]    Date of Admission: 3/19/2018  Length of Stay: 1  Primary Care Physician: Lori Mcmahon MD    Subjective   Subjective     CC:  crohns flare    HPI:  Pt feels alittle better today still with abd pain. Lying in bed, feels weak today, fever and chills overnight.      Review of Systems  abd pain , fever, chills.     Otherwise ROS is negative except as mentioned in the HPI.    Objective   Objective     Vital Signs:   Temp:  [98 °F (36.7 °C)-103 °F (39.4 °C)] 98.4 °F (36.9 °C)  Heart Rate:  [] 88  Resp:  [16-22] 18  BP: (102-117)/(62-76) 110/70        Physical Exam:  Constitutional: ill appearing but some better today , awake, alert  Eyes: PERRLA, sclerae anicteric, no conjunctival injection  HENT: NCAT, mucous membranes dry   Neck: Supple, no thyromegaly, no lymphadenopathy, trachea midline  Respiratory: Clear to auscultation bilaterally, nonlabored respirations   Cardiovascular: RRR, no murmurs, rubs, or gallops, palpable pedal pulses bilaterally  Gastrointestinal:decreased bowel sounds, soft,generalized tender more focused on LLQ, minimal distended  Musculoskeletal: No bilateral ankle edema, no clubbing or cyanosis to extremities  Psychiatric: Appropriate affect, cooperative  Neurologic: Oriented x 3, strength symmetric in all extremities, Cranial Nerves grossly intact to confrontation, speech clear  Skin: No rashes    Results Reviewed:  I have personally reviewed current lab, radiology, and data and agree.      Results from last 7 days  Lab Units 18  0517 18  0701   WBC 10*3/mm3 8.29 7.67   HEMOGLOBIN g/dL 6.6* 7.8*   HEMATOCRIT % 23.2* 27.0*   PLATELETS 10*3/mm3 948* 1,075*       Results from last 7 days  Lab Units 18  0517 18  0701   SODIUM mmol/L 137 136   POTASSIUM mmol/L 3.2* 3.2*   CHLORIDE mmol/L 103 100   CO2 mmol/L 20.0 26.0   BUN mg/dL 8*  10   CREATININE mg/dL 0.70 0.80   GLUCOSE mg/dL 86 120*   CALCIUM mg/dL 8.6* 9.2   ALT (SGPT) U/L 16 10   AST (SGOT) U/L 19 13     Estimated Creatinine Clearance: 127.1 mL/min (by C-G formula based on SCr of 0.7 mg/dL).  No results found for: BNP  No results found for: PHART    Microbiology Results Abnormal     Procedure Component Value - Date/Time    Blood Culture - Blood, [[id number]]  (Normal) Collected:  03/19/18 1133    Lab Status:  Preliminary result Specimen:  Blood from Arm, Right Updated:  03/20/18 1216     Blood Culture No growth at 24 hours    Blood Culture - Blood, [[id number]]  (Normal) Collected:  03/19/18 1134    Lab Status:  Preliminary result Specimen:  Blood from Arm, Left Updated:  03/20/18 1216     Blood Culture No growth at 24 hours    Urine Culture - Urine, Urine, Clean Catch [[id number]]  (Abnormal) Collected:  03/19/18 0738    Lab Status:  Preliminary result Specimen:  Urine from Urine, Clean Catch Updated:  03/20/18 0822     Urine Culture --      <10,000 CFU/mL Gram Negative Bacilli (A)    Influenza A & B, RT PCR - Swab, Nasopharynx [[id number]]  (Normal) Collected:  03/19/18 1732    Lab Status:  Final result Specimen:  Swab from Nasopharynx Updated:  03/19/18 1946     Influenza A PCR Not Detected     Influenza B PCR Not Detected          Imaging Results (last 24 hours)     Procedure Component Value Units Date/Time    CT Abdomen Pelvis With & Without Contrast [[id number]] Collected:  03/19/18 1602     Updated:  03/20/18 0048    Narrative:       EXAM:    CT Abdomen and Pelvis Without and With Intravenous Contrast    EXAM DATE/TIME:    3/19/2018 4:02 PM    CLINICAL HISTORY:    37 years old, female; Crohn's disease, unspecified, with abscess; Pain;   Abdominal pain; Flank; Left; Additional info: Abn findings on diagnostic   imaging of body structures    TECHNIQUE:    Axial computed tomography images of the abdomen and pelvis without and with   intravenous contrast.  Delayed images through the  abdomen and pelvis were   performed.    Oral contrast was administered.    Coronal reformatted images were created and reviewed.    All CT scans at this facility use one or more dose reduction techniques,   viz.: automated exposure control; ma/kV adjustment per patient size (including   targeted exams where dose is matched to indication; i.e. head); or iterative   reconstruction technique.    CONTRAST:    99 mL of isovue 300 administered intravenously.    COMPARISON:    CT ABDOMEN PELVIS W CONTRAST 2018-01-09 14:26    FINDINGS:    Lower thorax:  Mild dependent atelectasis posterior lungs, increased.    Possible trace pleural effusions, slightly greater on the LEFT, new.     ABDOMEN:    Liver:  Unremarkable as visualized.  No mass.    Gallbladder and bile ducts:  Gallbladder unremarkable.  No calcified stones.    No ductal dilation.    Pancreas:  Pancreas unremarkable as visualized.  No ductal dilation.    Spleen:  Unremarkable as visualized.  No splenomegaly.    Adrenals:  Adrenals unremarkable.    Kidneys and ureters:  No hydronephrosis or hydroureter.  No definite renal   cortical solid lesion.  Mild LEFT perinephric fluid greatest inferiorly, likely   reactive secondary to the adjacent mesenteric inflammation/phlegmon.    Stomach and bowel:  No evidence of intestinal obstruction.  Oral contrast   demonstrated throughout the small bowel and throughout the colon.  No evidence   of small bowel wall thickening.    Diffuse wall thickening distal transverse colon and sigmoid colon with the   extent of the wall thickening decreased slightly since previous study with   slightly less of the transverse colon and descending colon affected.    However, there is increasing phlegmon including several small foci of   extraluminal air medial to the descending colon, overall measuring   approximately at least 6 cm in AP extent, 4 cm in greatest transverse extent,   and extending for at least 9.5 cm in craniocaudal direction.   There are several   areas which suggest early fluid collection although there is no walled off   fluid collection with enhancing rim on post contrast or delayed images.  The   largest possible fluid collection measures approximately 3 cm in greatest   diameter and is at the inferior aspect of the inflammation/phlegmon.    Oral contrast is demonstrated throughout the abnormal colon with no   extravasation of oral contrast demonstrated.  There was an area of likely   extraluminal abscess formation medial to the mid descending colon on the prior   study but the overall extent has increased significantly.    The remainder of the colon appears normal with no other area of wall   thickening.    Appendix:  A normal appendix is identified.  There is no evidence of   distention or periappendiceal inflammation to suggest appendicitis.     PELVIS:    Bladder:  Urinary bladder unremarkable with no calcification or wall   thickening.      Reproductive:  Likely physiologic ovarian cysts/follicles.     ABDOMEN and PELVIS:    Intraperitoneal space:  See above.    Bones/joints:  No acute bone abnormality.    Soft tissues:  Small fat-containing umbilical hernia.    Vasculature:  Unremarkable as visualized.  No abdominal aortic aneurysm.    Lymph nodes:  No significant lymphadenopathy.      Impression:         Diffusely increasing inflammation/phlegmon medial to the descending colon with   extraluminal air and several small areas of likely early fluid/abscess   formation although none are walled off and definitely localized.    Diffuse nonspecific colitis distal transverse and descending colon with the   overall extent of the inflammation decreasing slightly since 1/9/2018.    No evidence of extravasation of oral contrast diagnostic for fistula.    Mild amount of LEFT perinephric fluid likely reactive as discussed above.  No   abnormality in the LEFT kidney demonstrated.    Possible trace bilateral pleural effusions and increasing  adjacent atelectasis   and/or infiltrates, all new since 01/09/2018.    Additional nonacute findings as noted, radiographically stable.    THIS DOCUMENT HAS BEEN ELECTRONICALLY SIGNED BY VICTORINO LAUREN MD    MRI Abdomen Pelvis Enterography [[id number]] Collected:  03/19/18 1134     Updated:  03/19/18 1617    Narrative:       EXAMINATION: MRI ABDOMEN AND PELVIS W/WO CONTRAST ENTEROGRAPHY-      INDICATION: Crohn disease suspected, severe abdominal pain  (fever/vomiting/leukocytosis).     TECHNIQUE: Routine MR imaging was obtained of the abdomen and pelvis pre  and post administration of oral and intravenous contrast according to  the enterography protocol.        COMPARISON: 01/09/2018.     FINDINGS: There is no significant change seen in the appearance of the  distal transverse and descending colon when compared to the prior study  of 01/09/2018. There is abnormal wall thickening identified of the colon  with pericolonic stranding. There is edema identified throughout the  mesentery surrounding the descending colon. There are several areas  concerning for extraluminal air and microperforation in the inflammatory  changes within the left flank. There is surrounding phlegmon and soft  tissue. Findings are concerning for multiloculated abscess with some  small pockets of fluid identified. Consider CT scan for further  evaluation of the changes within the left flank if clinically indicated.  The remainder of the bowel reveals no other gross abnormality. The liver  is homogeneous in appearance. No stones seen in the gallbladder. There  is distention of the stomach. Pancreas is homogeneous. Spleen is  unremarkable. The kidneys and adrenal glands within normal limits. No  abdominal or retroperitoneal lymphadenopathy.     PELVIS: The pelvic organs are unremarkable. The pelvic portions of the  gastrointestinal tract are within normal limits. No pelvic adenopathy.  No free fluid or free air. Bony structures are unremarkable. No  abnormal  mass or fluid collection is identified.           Impression:       There is abnormal wall thickening again seen of the distal  transverse colon and descending colon with findings concerning for  multiloculated abscess with extraluminal air seen surrounding the  phlegmon and extensive inflammatory changes seen within the mesenteric  fat in the left flank. Consider CT scan for further evaluation     D:  03/19/2018  E:  03/19/2018        This report was finalized on 3/19/2018 4:16 PM by Dr. Erika Hoover MD.                I have reviewed the medications.    Assessment/Plan   Assessment / Plan     Hospital Problem List     Exacerbation of Crohn's disease of large intestine    Abdominal abscess    Exacerbation of Crohn's disease with abscess             Brief Hospital Course to date:  Sultana Siegel is a 37 y.o. female who is admitted with abdominal pain and fever.  Patient has  known history of Crohn's disease for over 20 ys.  She was hospitalized in January. At that time She was started on a steroids.  She improved.  After tapering her steroids she became worse. She continued to have a fever and her abdominal began  became worse therefore so she came to emergency room.. MRI today shows wall thickening in the distal transverse and descending colon with multiloculated abscess with extraluminal air seen surrounding phlegmon and extensive inflammatory changes seen in the mesentery fat the left flank. .       Assessment & Plan:  Crohn's Enterocolitis with abscess and phlegmon -  Continue IV abx, Blood cx nega x 24 hours.Vanc, Zosyn and Diflucan so far.  ID has been consulted as well as CRS/GI.  Hold Humira and other immunocompromising meds while infection. To restart as infection clears.   --MRI and CT abd reviewed.       Abd pain   --dilauidid, zofran prn     Anemia  --transfuse 2 units PRBC's, pt denies any blood in stool that she knows of.      Hypokalemia  --replace per protocol, follow Mag as well.      Elevated Sed and CRP  --IBD    Flu negative    DVT Prophylaxis: teds/scds      CODE STATUS: Full Code    Disposition: I expect the patient to be discharged home in tbd       Electronically signed by Jenn Simpson DO, 03/20/18, 3:56 PM.

## 2018-03-20 NOTE — PROGRESS NOTES
"Adult Nutrition  Assessment/PES    Patient Name:  Sultana Siegel  YOB: 1980  MRN: [id number]  Admit Date:  3/19/2018    Assessment Date:  3/20/2018  Time: 20 minutes    Active Problems:    Exacerbation of Crohn's disease of large intestine    Abdominal abscess    Exacerbation of Crohn's disease with abscess      Comments: Patient to be seen for MST score of +4. Patient estimates an 8lb weight loss over the past 3 weeks due to GI symptoms and decreased appetite. Reports UBW fluctuates but \"normal for her\" is 175 lbs. Per standing scale weight of 162 lbs, she has lost 13 lbs over this period of time. Estimates she has been eating 50% less than her normal intake. Currently on clear liquids and tolerating. Eating minimal amounts. Willing to try ensure clear supplement. Will defer to RD for further nutrition assessment.    Adult Nutrition Assessment    Row Name 03/20/18 1116       Reason for Assessment    Reason For Assessment identified at risk by screening criteria    Identified At Risk by Screening Criteria MST SCORE 2+       Nutrition/Diet History    Factors Affecting Nutritional Intake abdominal pain;nausea   Reports poor PO intake over the last 3 weeks related to GI symptoms and nausea. Estimates eating less than 50% of normal PO intake. Tolerating clears, eating minimal amounts. Willing to try clear liquid supplement          Row Name 03/20/18 1122       Labs/Procedures/Meds    Lab Results Reviewed reviewed       Nutrition Prescription PO    Current PO Diet Clear Liquid       PO Evaluation    Number of Days PO Intake Evaluated --   no recorded PO intake    Row Name 03/20/18 1117       Anthropometrics    Height 172.7 cm (68\")    Weight 73.9 kg (162 lb 14.7 oz)   standing scale weight per charting (3/19)       Ideal Body Weight (IBW)    Ideal Body Weight (IBW) (kg) 64.15    % Ideal Body Weight 115.2       Body Mass Index (BMI)    BMI (kg/m2) 24.82      Usual Body Weight (UBW)    Usual Body Weight " 79.4 kg (175 lb)   patient reported UBW    % Usual Body Weight 93.1    Weight Loss unintentional   13 lbs (approx 7.4% weight change)    Weight Loss Time Frame 3 weeks       Problem/Interventions:        Problem 1     Row Name 03/20/18 1123       Nutrition Diagnoses Problem 1    Problem 1 Inadequate Intake/Infusion    Inadequate Intake Type Oral    Etiology (related to) --   clinical condition    Signs/Symptoms (evidenced by) Clear Liquid Diet            Problem 2     Row Name 03/20/18 1123       Nutrition Diagnoses Problem 2    Problem 2 Unintended Weight Loss    Etiology (related to) --   decreased appetite and PO intake    Signs/Symptoms (evidenced by) Unintended Weight Change    Unintended Weight Change Loss    Number of Pounds Lost 13 lbs    Weight loss time period 3 weeks                  Intervention Goal     Row Name 03/20/18 1123       Intervention Goal    General Nutrition support treatment    PO Tolerate PO;Advance diet            Nutrition Intervention     Row Name 03/20/18 1124       Nutrition Intervention    RD/Tech Action Advise alternate selection;Encourage intake;Recommend/ordered;Supplement provided;Follow Tx progress;Care plan reviewd    Recommended/Ordered Supplement            Nutrition Prescription     Row Name 03/20/18 1124       Nutrition Prescription PO    PO Prescription Begin/change supplement    Supplement Ensure Clear    Supplement Frequency 2 times a day    New PO Prescription Ordered? Yes            Education/Evaluation     Row Name 03/20/18 1124       Monitor/Evaluation    Monitor Per protocol;PO intake;Supplement intake        Electronically signed by:  Marcela Rueda  03/20/18 11:25 AM

## 2018-03-20 NOTE — PLAN OF CARE
Problem: Patient Care Overview  Goal: Plan of Care Review  Outcome: Ongoing (interventions implemented as appropriate)   03/20/18 5717   Coping/Psychosocial   Plan of Care Reviewed With patient   OTHER   Outcome Summary Patient received 2 units of prbcs today with no difficulty noted. IVF's and antibiotics continue. VSS.    Plan of Care Review   Progress improving       Problem: Fluid Volume Deficit (Adult)  Goal: Identify Related Risk Factors and Signs and Symptoms  Outcome: Ongoing (interventions implemented as appropriate)    Goal: Optimal Fluid Balance  Outcome: Ongoing (interventions implemented as appropriate)

## 2018-03-20 NOTE — PROGRESS NOTES
Discharge Planning Assessment  [facility]     Patient Name: Sultana Siegel  MRN: [id number]  Today's Date: 3/20/2018    Admit Date: 3/19/2018          Discharge Needs Assessment     Row Name 03/20/18 1107       Living Environment    Lives With alone    Current Living Arrangements home/apartment/condo    [facility] Care Provided by [facility]    Quality of Family Relationships supportive    Able to Return to Prior Arrangements yes       Resource/Environmental Concerns    Resource/Environmental Concerns none    Transportation Concerns car, none       Transition Planning    Patient/Family Anticipates Transition to home    Patient/Family Anticipated Services at Transition none    Transportation Anticipated family or friend will provide       Discharge Needs Assessment    Readmission Within the Last 30 Days no previous admission in last 30 days    Concerns to be Addressed no discharge needs identified;denies needs/concerns at this time    Equipment Currently Used at Home none    Anticipated Changes Related to Illness none    Equipment Needed After Discharge none            Discharge Plan     Row Name 03/20/18 1108       Plan    Plan HOME    Patient/Family in Agreement with Plan yes    Plan Comments Met with pt at bedside.  She resides alone in [facility]  Independent with ADLs.  No current HH or DME.  Confirmed she has [employer] insurance with Rx coverage.  Goal is to return home upon DC.  No immediate needs identified/voiced.  CM will cont to follow.    Final Discharge Disposition Code 01 - home or self-care        Destination     No service coordination in this encounter.      Durable Medical Equipment     No service coordination in this encounter.      Dialysis/Infusion     No service coordination in this encounter.      Home Medical Care     No service coordination in this encounter.      Social Care     No service coordination in this encounter.                Demographic Summary     Row Name 03/20/18 1107       General  Information    Admission Type inpatient    Referral Source admission list    Reason for Consult discharge planning    Preferred Language English       Contact Information    Permission Granted to Share Info With     Contact Information Obtained for             Functional Status     Row Name 03/20/18 1107       Functional Status    Usual Activity Tolerance good       Functional Status, IADL    Medications independent    Meal Preparation independent    Housekeeping independent    Laundry independent    Shopping independent            Psychosocial    No documentation.           Abuse/Neglect    No documentation.           Legal    No documentation.           Substance Abuse    No documentation.           Patient Forms    No documentation.         Dana Knapp

## 2018-03-20 NOTE — SIGNIFICANT NOTE
Called to get CT read, was only in as routine, asked for stat read and will be sent off now. Will await report. CRS saw pt today as well.

## 2018-03-20 NOTE — PLAN OF CARE
Problem: Patient Care Overview  Goal: Plan of Care Review  Outcome: Ongoing (interventions implemented as appropriate)   03/20/18 0645   Coping/Psychosocial   Plan of Care Reviewed With patient   OTHER   Outcome Summary pt resting comfortable no complaints       Problem: Fluid Volume Deficit (Adult)  Goal: Identify Related Risk Factors and Signs and Symptoms  Outcome: Ongoing (interventions implemented as appropriate)    Goal: Optimal Fluid Balance  Outcome: Ongoing (interventions implemented as appropriate)

## 2018-03-20 NOTE — CONSULTS
Sultana Siegel  1980  [id number]    Date of Consult: 3/20/2018    Admit Date:  3/19/2018      Requesting Provider: No ref. provider found  Evaluating Physician: Anuel Dunbar MD    Chief Complaint: Abdominal pain, fever    Reason for Consultation: Intra-abdominal infection    History of present illness:    Patient is a 37 y.o.  Yr old female with history of Crohn's disease, follows with gastroenterology previously on Remicade and more recently on Humira, immunocompromise host with 2-3 weeks of persistent abdominal pain, fevers/sweats/chills and admission on March 19, 2018; multiple bowel movements over 24 hours after admission, has diarrhea with nausea but no vomiting.  Fever to 103.  No prior surgery, past anal fissure treated by Dr. Skaggs; colorectal surgery team following; MRI of the abdomen on March 19 showed abnormal wall thickening of the distal transverse colon/descending colon with concern for multiloculated abscesses/extraluminal air/phlegmon and extensive inflammatory changes seen within the mesenteric fat at the left flank.  CT scan showed diffusely increasing inflammation/phlegmon near the descending colon with extraluminal air and concern for possible early fluid/abscess formation although none walled off or definitely localized per radiology.  Diffuse nonspecific colitis of the distal transverse/descending colon noted; no extravasation or fistula formation.  Mild left perinephric fluid noted.  In addition she has primarily dry cough with lower lung changes on CT scan suggesting atelectasis versus infiltrates    Abdominal pain has varied in intensity but increased over weeks, 10 out of 10 at its worse, worse with movement and pressure, better with pain meds and nonradiating, more so on the left lower quadrant than otherwise.  Associated with diarrhea as above with nausea but no vomiting.  No overt bleeding at present.  No jaundice and no chronic liver disease that she knows of.  Urinating is  uncomfortable but she denies overt hematuria or pyuria.  No new hesitancy urgency or flank pain    No headache photophobia or neck stiffness.  No hemoptysis.  No skin rash.    No raw or undercooked food.  No unpasteurized milk or milk products.  No animal insect or arthropod exposure.  No tick bites.  No outdoor camping or hunting exposure.  No travel exposure.  No ill exposure.  No history of TB or TB exposure.   Denies a history of MRSA/VRE and no history of C. difficile or ESBL/KPC  organisms.    Past Medical History:   Diagnosis Date   • Crohn disease        History reviewed. No pertinent surgical history.    Pediatric History   Patient Guardian Status   • Father:  Han Siegel     Other Topics Concern   • Not on file     Social History Narrative   • No narrative on file   She denies current tobacco alcohol or illicit drugs    family history includes GI problems in her brother; No Known Problems in her father, maternal grandfather, maternal grandmother, mother, paternal grandfather, and paternal grandmother.    No Known Allergies    Medication:  Current Facility-Administered Medications   Medication Dose Route Frequency Provider Last Rate Last Dose   • [START ON 3/21/2018] !Vancomycin trough 3/21 @ 1230. Please hold dose until pharmacy evaluates level   Does not apply Once Baltazar Thurman [facility]       • acetaminophen (TYLENOL) tablet 650 mg  650 mg Oral Q6H PRN Haroon Maddox MD   650 mg at 03/19/18 1812   • fluconazole (DIFLUCAN) IVPB 400 mg  400 mg Intravenous Daily Mahin Abebe  mL/hr at 03/19/18 2100 400 mg at 03/20/18 0858   • HYDROmorphone (DILAUDID) injection 0.5 mg  0.5 mg Intravenous Q3H PRN Jennjúnior Simpson DO   0.5 mg at 03/20/18 0903   • Linezolid (ZYVOX) 600 mg 300 mL  600 mg Intravenous Q12H Anuel Dunbar MD       • Pharmacy to dose vancomycin   Does not apply Continuous PRN Jennmau Simpson DO       • piperacillin-tazobactam (ZOSYN) 3.375 g/100 mL 0.9% NS IVPB (mbp)  3.375 g Intravenous  Q8H Jenn CHRISTINA Simpson, DO   3.375 g at 03/20/18 0858   • polyethylene glycol 3350 powder (packet)  17 g Oral BID Mahin Abebe MD       • potassium chloride (KLOR-CON) packet 40 mEq  40 mEq Oral PRN Jenn L Atkins, DO       • potassium chloride (MICRO-K) CR capsule 40 mEq  40 mEq Oral PRN Jenn L Atkins, DO       • sodium chloride 0.9 % flush 10 mL  10 mL Intravenous PRN Paulo Blackman MD       • sodium chloride 0.9 % infusion  125 mL/hr Intravenous Continuous Jenn CHRISTINA Simpson  mL/hr at 03/20/18 0616 125 mL/hr at 03/20/18 0616       Antibiotics:  Anti-Infectives     Ordered     Dose/Rate Route Frequency Start Stop    03/20/18 0956  Linezolid (ZYVOX) 600 mg 300 mL     Ordering Provider:  Anuel Dunbar MD    600 mg  300 mL/hr over 60 Minutes Intravenous Every 12 Hours 03/20/18 1030 03/30/18 1029    03/19/18 1816  fluconazole (DIFLUCAN) IVPB 400 mg     Ordering Provider:  Mahin Abebe MD    400 mg  over 60 Minutes Intravenous Daily 03/19/18 2000 03/22/18 0859    03/19/18 1453  piperacillin-tazobactam (ZOSYN) 3.375 g/100 mL 0.9% NS IVPB (mbp)     Ordering Provider:  Jenn Simpson DO    3.375 g  over 4 Hours Intravenous Every 8 Hours 03/19/18 1800 03/26/18 1759    03/19/18 1432  Pharmacy to dose vancomycin     Ordering Provider:  Jenn Simpson DO     Does not apply Continuous PRN 03/19/18 1432 03/26/18 1431    03/19/18 1043  piperacillin-tazobactam (ZOSYN) 4.5 g/100 mL 0.9% NS IVPB (mbp)     Ordering Provider:  Paulo Blackman MD    4.5 g Intravenous Once 03/19/18 1045 03/19/18 1327    03/19/18 1043  vancomycin IVPB 1500 mg in 0.9% NaCl (Premix) 500 mL     Ordering Provider:  Paulo Blackman MD    20 mg/kg × 72.6 kg Intravenous Once 03/19/18 1045 03/19/18 1325            Review of Systems    Constitutional-- Appetite diminished with generalized malaise and fatigue  Heent-- No new vision, hearing or throat complaints.  No epistaxis or oral sores.  Denies odynophagia or dysphagia.  No  "flashers, floaters or eye pain. No odynophagia or dysphagia. No headache, photophobia or neck stiffness.  CV-- No chest pain, palpitation or syncope  Resp-- No SOB/Hemoptysis  GI- No hematochezia, melena, or hematemesis. Denies jaundice or chronic liver disease.  -- No hematuria, or flank pain.  Denies hesitancy, urgency or flank pain.  Lymph- no swollen lymph nodes in neck/axilla or groin.   Heme- No active bruising or bleeding; no Hx of DVT or PE.  MS-- no swelling or pain in the bones or joints of arms/legs.  No new back pain.  Neuro-- No acute focal weakness or numbness in the arms or legs.  No seizures.    Full 12 point review of systems reviewed and negative otherwise for acute complaints, except for above    Physical Exam:   Vital Signs   /67 (BP Location: Left arm, Patient Position: Lying)   Pulse 86   Temp 98.7 °F (37.1 °C) (Oral)   Resp 20   Ht 172.7 cm (68\")   Wt 73.9 kg (162 lb 15.9 oz)   LMP 03/01/2018 (Exact Date)   SpO2 97%   BMI 24.78 kg/m²     GENERAL: Awake and alert, in no acute distress.   HEENT: Normocephalic, atraumatic.  PERRL. EOMI. No conjunctival injection. No icterus. Oropharynx clear without evidence of thrush or exudate. No evidence of peridontal disease.    NECK: Supple without nuchal rigidity. No mass.  LYMPH: No cervical, axillary or inguinal lymphadenopathy.  HEART: RRR; No murmur, rubs, gallops.   LUNGS: Diminished at bases with scattered rhonchi. Normal respiratory effort. Nonlabored. No dullness.  ABDOMEN: Soft, tender and slightly distended. Positive bowel sounds. No rebound or guarding. NO mass or HSM.  EXT:  No cyanosis, clubbing or edema. No cord.  : Genitalia generally unremarkable.  Without Melchor catheter.  MSK: FROM without joint effusions noted arms/legs.    SKIN: Warm and dry without cutaneous eruptions on Inspection/palpation.    NEURO: Oriented to PPT. No focal deficits on motor/sensory exam at arms/legs.  PSYCHIATRIC: Normal insight and judgement. " Cooperative with PE    No peripheral stigmata/phenomena of endocarditis    Laboratory Data      Results from last 7 days  Lab Units 03/20/18  0517 03/19/18  0701   WBC 10*3/mm3 8.29 7.67   HEMOGLOBIN g/dL 6.6* 7.8*   HEMATOCRIT % 23.2* 27.0*   PLATELETS 10*3/mm3 948* 1,075*       Results from last 7 days  Lab Units 03/20/18  0517   SODIUM mmol/L 137   POTASSIUM mmol/L 3.2*   CHLORIDE mmol/L 103   CO2 mmol/L 20.0   BUN mg/dL 8*   CREATININE mg/dL 0.70   GLUCOSE mg/dL 86   CALCIUM mg/dL 8.6*       Results from last 7 days  Lab Units 03/20/18  0517   ALK PHOS U/L 167*   BILIRUBIN mg/dL 0.2*   ALT (SGPT) U/L 16   AST (SGOT) U/L 19       Results from last 7 days  Lab Units 03/19/18  0951   SED RATE mm/hr 106*       Results from last 7 days  Lab Units 03/19/18  0729   CRP mg/dL 29.02*       Estimated Creatinine Clearance: 127.1 mL/min (by C-G formula based on SCr of 0.7 mg/dL).      Microbiology:      Radiology:  Imaging Results (last 72 hours)     Procedure Component Value Units Date/Time    CT Abdomen Pelvis With & Without Contrast [[id number]] Collected:  03/19/18 1602     Updated:  03/20/18 0048    Narrative:       EXAM:    CT Abdomen and Pelvis Without and With Intravenous Contrast    EXAM DATE/TIME:    3/19/2018 4:02 PM    CLINICAL HISTORY:    37 years old, female; Crohn's disease, unspecified, with abscess; Pain;   Abdominal pain; Flank; Left; Additional info: Abn findings on diagnostic   imaging of body structures    TECHNIQUE:    Axial computed tomography images of the abdomen and pelvis without and with   intravenous contrast.  Delayed images through the abdomen and pelvis were   performed.    Oral contrast was administered.    Coronal reformatted images were created and reviewed.    All CT scans at this facility use one or more dose reduction techniques,   viz.: automated exposure control; ma/kV adjustment per patient size (including   targeted exams where dose is matched to indication; i.e. head); or iterative    reconstruction technique.    CONTRAST:    99 mL of isovue 300 administered intravenously.    COMPARISON:    CT ABDOMEN PELVIS W CONTRAST 2018-01-09 14:26    FINDINGS:    Lower thorax:  Mild dependent atelectasis posterior lungs, increased.    Possible trace pleural effusions, slightly greater on the LEFT, new.     ABDOMEN:    Liver:  Unremarkable as visualized.  No mass.    Gallbladder and bile ducts:  Gallbladder unremarkable.  No calcified stones.    No ductal dilation.    Pancreas:  Pancreas unremarkable as visualized.  No ductal dilation.    Spleen:  Unremarkable as visualized.  No splenomegaly.    Adrenals:  Adrenals unremarkable.    Kidneys and ureters:  No hydronephrosis or hydroureter.  No definite renal   cortical solid lesion.  Mild LEFT perinephric fluid greatest inferiorly, likely   reactive secondary to the adjacent mesenteric inflammation/phlegmon.    Stomach and bowel:  No evidence of intestinal obstruction.  Oral contrast   demonstrated throughout the small bowel and throughout the colon.  No evidence   of small bowel wall thickening.    Diffuse wall thickening distal transverse colon and sigmoid colon with the   extent of the wall thickening decreased slightly since previous study with   slightly less of the transverse colon and descending colon affected.    However, there is increasing phlegmon including several small foci of   extraluminal air medial to the descending colon, overall measuring   approximately at least 6 cm in AP extent, 4 cm in greatest transverse extent,   and extending for at least 9.5 cm in craniocaudal direction.  There are several   areas which suggest early fluid collection although there is no walled off   fluid collection with enhancing rim on post contrast or delayed images.  The   largest possible fluid collection measures approximately 3 cm in greatest   diameter and is at the inferior aspect of the inflammation/phlegmon.    Oral contrast is demonstrated throughout the  abnormal colon with no   extravasation of oral contrast demonstrated.  There was an area of likely   extraluminal abscess formation medial to the mid descending colon on the prior   study but the overall extent has increased significantly.    The remainder of the colon appears normal with no other area of wall   thickening.    Appendix:  A normal appendix is identified.  There is no evidence of   distention or periappendiceal inflammation to suggest appendicitis.     PELVIS:    Bladder:  Urinary bladder unremarkable with no calcification or wall   thickening.      Reproductive:  Likely physiologic ovarian cysts/follicles.     ABDOMEN and PELVIS:    Intraperitoneal space:  See above.    Bones/joints:  No acute bone abnormality.    Soft tissues:  Small fat-containing umbilical hernia.    Vasculature:  Unremarkable as visualized.  No abdominal aortic aneurysm.    Lymph nodes:  No significant lymphadenopathy.      Impression:         Diffusely increasing inflammation/phlegmon medial to the descending colon with   extraluminal air and several small areas of likely early fluid/abscess   formation although none are walled off and definitely localized.    Diffuse nonspecific colitis distal transverse and descending colon with the   overall extent of the inflammation decreasing slightly since 1/9/2018.    No evidence of extravasation of oral contrast diagnostic for fistula.    Mild amount of LEFT perinephric fluid likely reactive as discussed above.  No   abnormality in the LEFT kidney demonstrated.    Possible trace bilateral pleural effusions and increasing adjacent atelectasis   and/or infiltrates, all new since 01/09/2018.    Additional nonacute findings as noted, radiographically stable.    THIS DOCUMENT HAS BEEN ELECTRONICALLY SIGNED BY VICTORINO LAUREN MD    MRI Abdomen Pelvis Enterography [[id number]] Collected:  03/19/18 1134     Updated:  03/19/18 2336    Narrative:       EXAMINATION: MRI ABDOMEN AND PELVIS W/WO  CONTRAST ENTEROGRAPHY-      INDICATION: Crohn disease suspected, severe abdominal pain  (fever/vomiting/leukocytosis).     TECHNIQUE: Routine MR imaging was obtained of the abdomen and pelvis pre  and post administration of oral and intravenous contrast according to  the enterography protocol.        COMPARISON: 01/09/2018.     FINDINGS: There is no significant change seen in the appearance of the  distal transverse and descending colon when compared to the prior study  of 01/09/2018. There is abnormal wall thickening identified of the colon  with pericolonic stranding. There is edema identified throughout the  mesentery surrounding the descending colon. There are several areas  concerning for extraluminal air and microperforation in the inflammatory  changes within the left flank. There is surrounding phlegmon and soft  tissue. Findings are concerning for multiloculated abscess with some  small pockets of fluid identified. Consider CT scan for further  evaluation of the changes within the left flank if clinically indicated.  The remainder of the bowel reveals no other gross abnormality. The liver  is homogeneous in appearance. No stones seen in the gallbladder. There  is distention of the stomach. Pancreas is homogeneous. Spleen is  unremarkable. The kidneys and adrenal glands within normal limits. No  abdominal or retroperitoneal lymphadenopathy.     PELVIS: The pelvic organs are unremarkable. The pelvic portions of the  gastrointestinal tract are within normal limits. No pelvic adenopathy.  No free fluid or free air. Bony structures are unremarkable. No abnormal  mass or fluid collection is identified.           Impression:       There is abnormal wall thickening again seen of the distal  transverse colon and descending colon with findings concerning for  multiloculated abscess with extraluminal air seen surrounding the  phlegmon and extensive inflammatory changes seen within the mesenteric  fat in the left  flank. Consider CT scan for further evaluation     D:  03/19/2018  E:  03/19/2018        This report was finalized on 3/19/2018 4:16 PM by Dr. Erika Hoover MD.               Impression:     --Acute sepsis with high fever/tachycardia and presumed intra-abdominal source of infection but also abnormal urinalysis/urine symptoms and possible UTI in addition to potential evolving respiratory source of infection.  Otherwise monitor for specific pathogen.  Empiric broad-spectrum antimicrobials with these concerns in mind.  Workup ongoing.  Resuscitative measures per internal medicine.    --Acute worsening of abdominal pain/enterocolitis/peritonitis, subacute pain in general associated with constitutional symptoms above and abnormal imaging with evidence for colitis/pericolonic inflammation and phlegmon, potentially evolving abscess including extraluminal air.  Complex process with colorectal surgery following to help define timing/options/threshold for surgical intervention.  No discrete fistula described so far.  Broad intra-abdominal coverage with Zyvox/Zosyn and Diflucan.  Taper or adjust depending on clinical course/study results and response to therapy.  She understands antibiotics are not a guarantee for cure.  High risk for surgical intervention and other serious sequela.    --Acute diarrhea.  Given immunosuppressed state and diffuse intra-abdominal inflammation, stool studies ordered including culture, O&P and C. difficile PCR.  May also have related to oral contrast administration.    --Acute anemia, likely blood loss associated with Crohn's disease/GI tract abnormalities as above    --Acute UTI with uncomfortable urination/bacteriuria and IC host    --Acute cough; abnormal imaging at the lower lobes may represent atelectasis associated with intra-abdominal process.  However could also represent early pneumonia.  Empiric antibiotics ongoing, try to collect sputum and send urine Legionella/strep antigens.   Flu PCR negative.    --Chronic Crohn's disease.  Immunocompromised host    PLAN: Thank you for asking us to see Sultana Siegel, I recommend the following:    --IV Zyvox/Zosyn/Diflucan, doxycycline    --I discussed potential risks and benefits of the prescribed antibiotics that include, but are not limited to, solid organ toxicity, neuro/ophthalmologic toxicity, renal toxicity, CDiff, cytopenias, hypersensitivity,  etc.. Patient/Family voice understanding and agree to proceed.    --Check/review labs cultures and scans    --History per nursing staff as well    --Discussed with microbiology    --Colorectal surgery following as above    --Highly complex set of issues with high risk for further serious morbidity and other serious sequela       Anuel Dunbar MD  3/20/2018

## 2018-03-21 DIAGNOSIS — K50.119 CROHN'S DISEASE OF LARGE INTESTINE WITH COMPLICATION (HCC): ICD-10-CM

## 2018-03-21 DIAGNOSIS — D64.9 ANEMIA, UNSPECIFIED TYPE: ICD-10-CM

## 2018-03-21 LAB
ABO + RH BLD: NORMAL
ABO + RH BLD: NORMAL
ALBUMIN SERPL-MCNC: 2.7 G/DL (ref 3.2–4.8)
ALBUMIN SERPL-MCNC: 3 G/DL (ref 3.2–4.8)
ALBUMIN/GLOB SERPL: 0.9 G/DL (ref 1.5–2.5)
ALBUMIN/GLOB SERPL: 1 G/DL (ref 1.5–2.5)
ALP SERPL-CCNC: 132 U/L (ref 25–100)
ALP SERPL-CCNC: 148 U/L (ref 25–100)
ALT SERPL W P-5'-P-CCNC: 10 U/L (ref 7–40)
ALT SERPL W P-5'-P-CCNC: 14 U/L (ref 7–40)
ANION GAP SERPL CALCULATED.3IONS-SCNC: 11 MMOL/L (ref 3–11)
ANION GAP SERPL CALCULATED.3IONS-SCNC: 8 MMOL/L (ref 3–11)
AST SERPL-CCNC: 11 U/L (ref 0–33)
AST SERPL-CCNC: 7 U/L (ref 0–33)
BACTERIA SPEC AEROBE CULT: ABNORMAL
BASOPHILS # BLD AUTO: 0.05 10*3/MM3 (ref 0–0.2)
BASOPHILS NFR BLD AUTO: 0.7 % (ref 0–1)
BH BB BLOOD EXPIRATION DATE: NORMAL
BH BB BLOOD EXPIRATION DATE: NORMAL
BH BB BLOOD TYPE BARCODE: 600
BH BB BLOOD TYPE BARCODE: 9500
BH BB DISPENSE STATUS: NORMAL
BH BB DISPENSE STATUS: NORMAL
BH BB PRODUCT CODE: NORMAL
BH BB PRODUCT CODE: NORMAL
BH BB UNIT NUMBER: NORMAL
BH BB UNIT NUMBER: NORMAL
BILIRUB SERPL-MCNC: 0.3 MG/DL (ref 0.3–1.2)
BILIRUB SERPL-MCNC: 0.4 MG/DL (ref 0.3–1.2)
BUN BLD-MCNC: <5 MG/DL (ref 9–23)
BUN BLD-MCNC: <5 MG/DL (ref 9–23)
BUN/CREAT SERPL: ABNORMAL (ref 7–25)
BUN/CREAT SERPL: ABNORMAL (ref 7–25)
CALCIUM SPEC-SCNC: 8 MG/DL (ref 8.7–10.4)
CALCIUM SPEC-SCNC: 8.5 MG/DL (ref 8.7–10.4)
CHLORIDE SERPL-SCNC: 107 MMOL/L (ref 99–109)
CHLORIDE SERPL-SCNC: 108 MMOL/L (ref 99–109)
CO2 SERPL-SCNC: 22 MMOL/L (ref 20–31)
CO2 SERPL-SCNC: 23 MMOL/L (ref 20–31)
CREAT BLD-MCNC: 0.7 MG/DL (ref 0.6–1.3)
CREAT BLD-MCNC: 0.7 MG/DL (ref 0.6–1.3)
CRP SERPL-MCNC: 19.86 MG/DL (ref 0–1)
CYTOLOGIST CVX/VAG CYTO: NORMAL
DEPRECATED RDW RBC AUTO: 61 FL (ref 37–54)
DEPRECATED RDW RBC AUTO: 61.1 FL (ref 37–54)
EOSINOPHIL # BLD AUTO: 1.02 10*3/MM3 (ref 0–0.3)
EOSINOPHIL NFR BLD AUTO: 13.7 % (ref 0–3)
ERYTHROCYTE [DISTWIDTH] IN BLOOD BY AUTOMATED COUNT: 22.5 % (ref 11.3–14.5)
ERYTHROCYTE [DISTWIDTH] IN BLOOD BY AUTOMATED COUNT: 22.7 % (ref 11.3–14.5)
ERYTHROCYTE [SEDIMENTATION RATE] IN BLOOD: 90 MM/HR (ref 0–20)
GFR SERPL CREATININE-BSD FRML MDRD: 94 ML/MIN/1.73
GFR SERPL CREATININE-BSD FRML MDRD: 94 ML/MIN/1.73
GLOBULIN UR ELPH-MCNC: 2.7 GM/DL
GLOBULIN UR ELPH-MCNC: 3.3 GM/DL
GLUCOSE BLD-MCNC: 81 MG/DL (ref 70–100)
GLUCOSE BLD-MCNC: 85 MG/DL (ref 70–100)
HCT VFR BLD AUTO: 26.6 % (ref 34.5–44)
HCT VFR BLD AUTO: 26.7 % (ref 34.5–44)
HGB BLD-MCNC: 7.9 G/DL (ref 11.5–15.5)
HGB BLD-MCNC: 8 G/DL (ref 11.5–15.5)
IMM GRANULOCYTES # BLD: 0.03 10*3/MM3 (ref 0–0.03)
IMM GRANULOCYTES NFR BLD: 0.4 % (ref 0–0.6)
LYMPHOCYTES # BLD AUTO: 1.09 10*3/MM3 (ref 0.6–4.8)
LYMPHOCYTES NFR BLD AUTO: 14.7 % (ref 24–44)
MCH RBC QN AUTO: 22.1 PG (ref 27–31)
MCH RBC QN AUTO: 22.3 PG (ref 27–31)
MCHC RBC AUTO-ENTMCNC: 29.7 G/DL (ref 32–36)
MCHC RBC AUTO-ENTMCNC: 30 G/DL (ref 32–36)
MCV RBC AUTO: 74.3 FL (ref 80–99)
MCV RBC AUTO: 74.4 FL (ref 80–99)
MONOCYTES # BLD AUTO: 0.69 10*3/MM3 (ref 0–1)
MONOCYTES NFR BLD AUTO: 9.3 % (ref 0–12)
NEUTROPHILS # BLD AUTO: 4.56 10*3/MM3 (ref 1.5–8.3)
NEUTROPHILS NFR BLD AUTO: 61.2 % (ref 41–71)
PATH INTERP BLD-IMP: NORMAL
PLATELET # BLD AUTO: 805 10*3/MM3 (ref 150–450)
PLATELET # BLD AUTO: 821 10*3/MM3 (ref 150–450)
PMV BLD AUTO: 8 FL (ref 6–12)
PMV BLD AUTO: 8.2 FL (ref 6–12)
POTASSIUM BLD-SCNC: 3.6 MMOL/L (ref 3.5–5.5)
POTASSIUM BLD-SCNC: 4.2 MMOL/L (ref 3.5–5.5)
PROT SERPL-MCNC: 5.4 G/DL (ref 5.7–8.2)
PROT SERPL-MCNC: 6.3 G/DL (ref 5.7–8.2)
RBC # BLD AUTO: 3.58 10*6/MM3 (ref 3.89–5.14)
RBC # BLD AUTO: 3.59 10*6/MM3 (ref 3.89–5.14)
SODIUM BLD-SCNC: 138 MMOL/L (ref 132–146)
SODIUM BLD-SCNC: 141 MMOL/L (ref 132–146)
UNIT  ABO: NORMAL
UNIT  ABO: NORMAL
UNIT  RH: NORMAL
UNIT  RH: NORMAL
WBC NRBC COR # BLD: 7.44 10*3/MM3 (ref 3.5–10.8)
WBC NRBC COR # BLD: 9.86 10*3/MM3 (ref 3.5–10.8)

## 2018-03-21 PROCEDURE — 80053 COMPREHEN METABOLIC PANEL: CPT

## 2018-03-21 PROCEDURE — 25010000002 LINEZOLID 600 MG/300ML SOLUTION: Performed by: INTERNAL MEDICINE

## 2018-03-21 PROCEDURE — 80053 COMPREHEN METABOLIC PANEL: CPT | Performed by: INTERNAL MEDICINE

## 2018-03-21 PROCEDURE — 25010000002 PROMETHAZINE PER 50 MG: Performed by: FAMILY MEDICINE

## 2018-03-21 PROCEDURE — 25010000002 FENTANYL CITRATE (PF) 100 MCG/2ML SOLUTION: Performed by: COLON & RECTAL SURGERY

## 2018-03-21 PROCEDURE — 85027 COMPLETE CBC AUTOMATED: CPT | Performed by: INTERNAL MEDICINE

## 2018-03-21 PROCEDURE — 36415 COLL VENOUS BLD VENIPUNCTURE: CPT

## 2018-03-21 PROCEDURE — 88305 TISSUE EXAM BY PATHOLOGIST: CPT | Performed by: COLON & RECTAL SURGERY

## 2018-03-21 PROCEDURE — 25010000002 PIPERACILLIN-TAZOBACTAM: Performed by: FAMILY MEDICINE

## 2018-03-21 PROCEDURE — 99233 SBSQ HOSP IP/OBS HIGH 50: CPT | Performed by: FAMILY MEDICINE

## 2018-03-21 PROCEDURE — 25010000002 NA FERRIC GLUC CPLX PER 12.5 MG: Performed by: NURSE PRACTITIONER

## 2018-03-21 PROCEDURE — 83921 ORGANIC ACID SINGLE QUANT: CPT | Performed by: NURSE PRACTITIONER

## 2018-03-21 PROCEDURE — 25010000002 HYDROMORPHONE PER 4 MG: Performed by: FAMILY MEDICINE

## 2018-03-21 PROCEDURE — 85652 RBC SED RATE AUTOMATED: CPT

## 2018-03-21 PROCEDURE — 0DBP8ZX EXCISION OF RECTUM, VIA NATURAL OR ARTIFICIAL OPENING ENDOSCOPIC, DIAGNOSTIC: ICD-10-PCS | Performed by: COLON & RECTAL SURGERY

## 2018-03-21 PROCEDURE — 25010000002 MIDAZOLAM PER 1 MG: Performed by: COLON & RECTAL SURGERY

## 2018-03-21 PROCEDURE — 0DBM8ZX EXCISION OF DESCENDING COLON, VIA NATURAL OR ARTIFICIAL OPENING ENDOSCOPIC, DIAGNOSTIC: ICD-10-PCS | Performed by: COLON & RECTAL SURGERY

## 2018-03-21 PROCEDURE — 86140 C-REACTIVE PROTEIN: CPT

## 2018-03-21 PROCEDURE — 85025 COMPLETE CBC W/AUTO DIFF WBC: CPT

## 2018-03-21 PROCEDURE — 99232 SBSQ HOSP IP/OBS MODERATE 35: CPT | Performed by: NURSE PRACTITIONER

## 2018-03-21 PROCEDURE — 25010000002 FLUCONAZOLE PER 200 MG: Performed by: COLON & RECTAL SURGERY

## 2018-03-21 RX ORDER — SODIUM CHLORIDE 0.9 % (FLUSH) 0.9 %
1-10 SYRINGE (ML) INJECTION AS NEEDED
Status: CANCELLED | OUTPATIENT
Start: 2018-03-21

## 2018-03-21 RX ORDER — FENTANYL CITRATE 50 UG/ML
INJECTION, SOLUTION INTRAMUSCULAR; INTRAVENOUS AS NEEDED
Status: COMPLETED | OUTPATIENT
Start: 2018-03-21 | End: 2018-03-21

## 2018-03-21 RX ORDER — MIDAZOLAM HYDROCHLORIDE 5 MG/ML
INJECTION INTRAMUSCULAR; INTRAVENOUS AS NEEDED
Status: COMPLETED | OUTPATIENT
Start: 2018-03-21 | End: 2018-03-21

## 2018-03-21 RX ORDER — PROMETHAZINE HYDROCHLORIDE 25 MG/ML
6.25 INJECTION, SOLUTION INTRAMUSCULAR; INTRAVENOUS ONCE
Status: COMPLETED | OUTPATIENT
Start: 2018-03-21 | End: 2018-03-21

## 2018-03-21 RX ORDER — FAMOTIDINE 20 MG/1
20 TABLET, FILM COATED ORAL ONCE
Status: CANCELLED | OUTPATIENT
Start: 2018-03-21 | End: 2018-03-21

## 2018-03-21 RX ORDER — LIDOCAINE HYDROCHLORIDE 10 MG/ML
0.5 INJECTION, SOLUTION EPIDURAL; INFILTRATION; INTRACAUDAL; PERINEURAL ONCE AS NEEDED
Status: CANCELLED | OUTPATIENT
Start: 2018-03-21

## 2018-03-21 RX ORDER — SODIUM CHLORIDE, SODIUM LACTATE, POTASSIUM CHLORIDE, CALCIUM CHLORIDE 600; 310; 30; 20 MG/100ML; MG/100ML; MG/100ML; MG/100ML
9 INJECTION, SOLUTION INTRAVENOUS CONTINUOUS
Status: CANCELLED | OUTPATIENT
Start: 2018-03-21

## 2018-03-21 RX ADMIN — LINEZOLID 600 MG: 600 INJECTION, SOLUTION INTRAVENOUS at 12:00

## 2018-03-21 RX ADMIN — DOXYCYCLINE 100 MG: 100 INJECTION, POWDER, LYOPHILIZED, FOR SOLUTION INTRAVENOUS at 09:35

## 2018-03-21 RX ADMIN — MIDAZOLAM HYDROCHLORIDE 2 MG: 5 INJECTION, SOLUTION INTRAMUSCULAR; INTRAVENOUS at 15:33

## 2018-03-21 RX ADMIN — HYDROMORPHONE HYDROCHLORIDE 0.5 MG: 10 INJECTION INTRAMUSCULAR; INTRAVENOUS; SUBCUTANEOUS at 22:20

## 2018-03-21 RX ADMIN — DOXYCYCLINE 100 MG: 100 INJECTION, POWDER, LYOPHILIZED, FOR SOLUTION INTRAVENOUS at 22:13

## 2018-03-21 RX ADMIN — PIPERACILLIN SODIUM,TAZOBACTAM SODIUM 3.38 G: 3; .375 INJECTION, POWDER, FOR SOLUTION INTRAVENOUS at 17:45

## 2018-03-21 RX ADMIN — FLUCONAZOLE 400 MG: 2 INJECTION, SOLUTION INTRAVENOUS at 09:35

## 2018-03-21 RX ADMIN — MIDAZOLAM HYDROCHLORIDE 4 MG: 5 INJECTION, SOLUTION INTRAMUSCULAR; INTRAVENOUS at 15:27

## 2018-03-21 RX ADMIN — PIPERACILLIN SODIUM,TAZOBACTAM SODIUM 3.38 G: 3; .375 INJECTION, POWDER, FOR SOLUTION INTRAVENOUS at 09:35

## 2018-03-21 RX ADMIN — MIDAZOLAM HYDROCHLORIDE 1 MG: 5 INJECTION, SOLUTION INTRAMUSCULAR; INTRAVENOUS at 15:28

## 2018-03-21 RX ADMIN — HYDROMORPHONE HYDROCHLORIDE 0.5 MG: 10 INJECTION INTRAMUSCULAR; INTRAVENOUS; SUBCUTANEOUS at 10:43

## 2018-03-21 RX ADMIN — SODIUM CHLORIDE 125 MG: 9 INJECTION, SOLUTION INTRAVENOUS at 13:04

## 2018-03-21 RX ADMIN — FENTANYL CITRATE 100 MCG: 50 INJECTION, SOLUTION INTRAMUSCULAR; INTRAVENOUS at 15:27

## 2018-03-21 RX ADMIN — LINEZOLID 600 MG: 600 INJECTION, SOLUTION INTRAVENOUS at 00:37

## 2018-03-21 RX ADMIN — POLYETHYLENE GLYCOL 3350 17 G: 17 POWDER, FOR SOLUTION ORAL at 09:34

## 2018-03-21 RX ADMIN — PROMETHAZINE HYDROCHLORIDE 6.25 MG: 25 INJECTION INTRAMUSCULAR; INTRAVENOUS at 01:19

## 2018-03-21 RX ADMIN — LINEZOLID 600 MG: 600 INJECTION, SOLUTION INTRAVENOUS at 22:20

## 2018-03-21 RX ADMIN — MIDAZOLAM HYDROCHLORIDE 2 MG: 5 INJECTION, SOLUTION INTRAMUSCULAR; INTRAVENOUS at 15:31

## 2018-03-21 RX ADMIN — PIPERACILLIN SODIUM,TAZOBACTAM SODIUM 3.38 G: 3; .375 INJECTION, POWDER, FOR SOLUTION INTRAVENOUS at 01:19

## 2018-03-21 NOTE — PROGRESS NOTES
"Colon and Rectal [CSGA]    HD 2    /76   Pulse 80   Temp 99.4 °F (37.4 °C) (Oral)   Resp 18   Ht 172.7 cm (68\")   Wt 73.9 kg (162 lb 14.7 oz) Comment: standing scale weight per charting (3/19)  LMP 03/01/2018 (Exact Date)   SpO2 98%   BMI 24.77 kg/m²     Lab Results (last 24 hours)     Procedure Component Value Units Date/Time    Iron Profile [[id number]]  (Abnormal) Collected:  03/20/18 0517    Specimen:  Blood Updated:  03/20/18 2035     Iron 10 (L) mcg/dL      TIBC 229 (L) mcg/dL      Iron Saturation 4 (L) %     Vitamin B12 [[id number]] Collected:  03/20/18 0517    Specimen:  Blood Updated:  03/20/18 2005    Folate [[id number]] Collected:  03/20/18 0517    Specimen:  Blood Updated:  03/20/18 2005    Ferritin [[id number]] Collected:  03/20/18 0517    Specimen:  Blood Updated:  03/20/18 2005    Gamma GT [[id number]] Collected:  03/20/18 0517    Specimen:  Blood Updated:  03/20/18 1952    Clostridium Difficile Toxin - Stool, Per Rectum [[id number]] Collected:  03/20/18 1644    Specimen:  Stool from Per Rectum Updated:  03/20/18 1839    Narrative:       The following orders were created for panel order Clostridium Difficile Toxin - Stool, Per Rectum.  Procedure                               Abnormality         Status                     ---------                               -----------         ------                     Clostridium Difficile To...[[id number]]  Normal              Final result                 Please view results for these tests on the individual orders.    Clostridium Difficile Toxin, PCR - Stool, Per Rectum [[id number]]  (Normal) Collected:  03/20/18 1644    Specimen:  Stool from Per Rectum Updated:  03/20/18 1839     C. Difficile Toxins by PCR Not Detected    Narrative:         Performance characteristics of test not established for patients <2 years of age.  Negative for Toxigenic C. Difficile    Stool Culture - Stool, Per Rectum [[id number]] Collected:  03/20/18 1644    Specimen:  Stool " from Per Rectum Updated:  03/20/18 1727    Ova & Parasite Examination - Stool, Per Rectum [[id number]] Collected:  03/20/18 1644    Specimen:  Stool from Per Rectum Updated:  03/20/18 1727    Legionella Antigen, Urine - Urine, Urine, Clean Catch [[id number]] Collected:  03/20/18 1645    Specimen:  Urine from Urine, Clean Catch Updated:  03/20/18 1704    S. Pneumo Ag Urine or CSF - Urine, Urine, Clean Catch [[id number]] Collected:  03/20/18 1645    Specimen:  Urine from Urine, Clean Catch Updated:  03/20/18 1704    Blood Culture - Blood, [[id number]]  (Normal) Collected:  03/19/18 1133    Specimen:  Blood from Arm, Right Updated:  03/20/18 1216     Blood Culture No growth at 24 hours    Blood Culture - Blood, [[id number]]  (Normal) Collected:  03/19/18 1134    Specimen:  Blood from Arm, Left Updated:  03/20/18 1216     Blood Culture No growth at 24 hours    Urine Culture - Urine, Urine, Clean Catch [[id number]]  (Abnormal) Collected:  03/19/18 0738    Specimen:  Urine from Urine, Clean Catch Updated:  03/20/18 0822     Urine Culture --      <10,000 CFU/mL Gram Negative Bacilli (A)    CBC & Differential [[id number]] Collected:  03/20/18 0517    Specimen:  Blood Updated:  03/20/18 0739    Narrative:       The following orders were created for panel order CBC & Differential.  Procedure                               Abnormality         Status                     ---------                               -----------         ------                     Scan Slide[[id number]]                                       Final result               CBC Auto Differential[[id number]]        Abnormal            Final result                 Please view results for these tests on the individual orders.    CBC Auto Differential [[id number]]  (Abnormal) Collected:  03/20/18 0517    Specimen:  Blood Updated:  03/20/18 0739     WBC 8.29 10*3/mm3      RBC 3.21 (L) 10*6/mm3      Hemoglobin 6.6 (L) g/dL      Hematocrit 23.2 (L) %      MCV 72.3 (L) fL       MCH 20.6 (L) pg      MCHC 28.4 (L) g/dL      RDW 23.0 (H) %      RDW-SD 59.0 (H) fl      MPV 8.0 fL      Platelets 948 (H) 10*3/mm3      Neutrophil % 67.5 %      Lymphocyte % 12.5 (L) %      Monocyte % 8.6 %      Eosinophil % 10.4 (H) %      Basophil % 0.6 %      Immature Grans % 0.4 %      Neutrophils, Absolute 5.60 10*3/mm3      Lymphocytes, Absolute 1.04 10*3/mm3      Monocytes, Absolute 0.71 10*3/mm3      Eosinophils, Absolute 0.86 (H) 10*3/mm3      Basophils, Absolute 0.05 10*3/mm3      Immature Grans, Absolute 0.03 10*3/mm3     Narrative:       Verified by repeat analysis.    Scan Slide [[id number]] Collected:  03/20/18 0517    Specimen:  Blood Updated:  03/20/18 0739     Mika Cells Slight/1+     Hypochromia Mod/2+     Microcytes Mod/2+     Ovalocytes Slight/1+     WBC Morphology Normal     Platelet Morphology Normal    Comprehensive Metabolic Panel [[id number]]  (Abnormal) Collected:  03/20/18 0517    Specimen:  Blood Updated:  03/20/18 0628     Glucose 86 mg/dL      BUN 8 (L) mg/dL      Creatinine 0.70 mg/dL      Sodium 137 mmol/L      Potassium 3.2 (L) mmol/L      Chloride 103 mmol/L      CO2 20.0 mmol/L      Calcium 8.6 (L) mg/dL      Total Protein 6.5 g/dL      Albumin 3.20 g/dL      ALT (SGPT) 16 U/L      AST (SGOT) 19 U/L      Alkaline Phosphatase 167 (H) U/L      Total Bilirubin 0.2 (L) mg/dL      eGFR Non African Amer 94 mL/min/1.73      Globulin 3.3 gm/dL      A/G Ratio 1.0 (L) g/dL      BUN/Creatinine Ratio 11.4     Anion Gap 14.0 (H) mmol/L     Narrative:       [employer] Guidelines    Stage     Description        GFR  1         Normal or High     90+  2         Mild decrease      60-89  3         Moderate decrease  30-59  4         Severe decrease    15-29  5         Kidney failure     <15    Lactic Acid, Plasma [[id number]]  (Normal) Collected:  03/20/18 0517    Specimen:  Blood Updated:  03/20/18 0621     Lactate 0.7 mmol/L      Comment: Falsely depressed results may occur on  samples drawn from patients receiving N-Acetylcysteine (NAC) or Metamizole.       Zinc [[id number]] Collected:  03/19/18 2211    Specimen:  Blood Updated:  03/19/18 2221          No intake/output data recorded.    Alert and oriented.  No nausea or vomiting.  Good pain control  Repeat CT scan does not show any extraluminal air.  Impressive inflammation continues in the transverse and left colon.  The left colon where her symptoms are, also are the most impressive on CT.  Her second unit of blood is in progress.  An iron infusion is also working.  She still was not passing any macroscopic blood.  Quiescent posterior anal fistula.    Impression: Impressive inflammation of the transverse and left colon that has been present for months.  No evidence of perforation.  Persistent anemia despite not having any rectal bleeding.  She does not eat a lot of red meat in can't eat spinach or other iron-containing foods.  Temperature has normalized for the first time in a while.    Recommendation: Agree with a change in Biologics.  Unfortunately, these usually have to be given as an outpatient with preapproval.  I have given one bottle of magnesium citrate in consideration for a colonoscopy on Wednesday.  Continued antibiotics since he seen the working well.  Mahin Abebe MD  03/20/18  9:35 PM

## 2018-03-21 NOTE — PROGRESS NOTES
[facility] [place]          INPATIENT Progress NOTE  Patient: JACKSON QUESADA : 1980   37 y.o. female Date of Service:18  Admission Info: 3/19/2018  6:50 AM for Exacerbation of Crohn's disease with abscess [K50.914]  Hospital Day:  LOS: 2 days   Room S451/1  Chief Complaint: I feel about the same  Assessment/Plan                                             ASSESSMENT & PLANS       Crohn's enterocolitis with abscess and phlegmon formation  · Continue antibiotics. Appreciate ID's recommendation   · Dr Alfaro will discuss w/ Dr Abebe (surgery), regarding plan of colonoscopy timing and treatment choice for Crohns     Acute Diarrhea. C. difficile PCR negative.     Acute on chronic anemia d/t Crohn's flare. Iron saturation at 4%  · Serial H/H. Transfuse per protocol. Get Crohn's under controlled.  · Peripheral blood smear, collected, pending result.  (pt also has thrombocytosis even when pt does not have Crohn’s flare  · Check result of B12, Folate, iron profile, methylmalonic acid ordered  · Continue Ferrlecit IV Iron replacement.  Orders placed    DISCUSSION: The patient’s case was discussed with Dr. Alfaro,, and we collaborated pt’s plan of care.The above plan was delineated in details with patient.  All questions and concerns were answered.  Patient is also given contact information.      Subjective                                                           SUBJECTIVE   Interval History:   History taken from: patient   ++Intermittent abd pain (generalized periumbilical mostly)  Large bm watery this morning, but no blood  Nausea mild. No vomiting  Diarrhea is about the same per pt. No blood in the stool    ROS: Const: Denies fever, chills, or night sweat.  ENT: Denies sore throat, epistaxis, or sinusitis  Objective                                                           OBJECTIVE   Allergy: Pt has No Known Allergies.  Scheduled Meds:    doxycycline 100 mg  Intravenous Q12H   ferric gluconate (FERRLECIT) IVPB 125 mg Intravenous Daily   Linezolid 600 mg Intravenous Q12H   piperacillin-tazobactam 3.375 g Intravenous Q8H   polyethylene glycol 17 g Oral BID      Infusions:     PRN Meds:  •  acetaminophen  •  HYDROmorphone  •  ondansetron  •  potassium chloride  •  potassium chloride  •  sodium chloride  LABS:     Results from last 7 days  Lab Units 03/21/18  0725 03/20/18  0517 03/19/18  0701   WBC 10*3/mm3 7.44 8.29 7.67   HEMOGLOBIN g/dL 8.0* 6.6* 7.8*   HEMATOCRIT % 26.7* 23.2* 27.0*   MCV fL 74.4* 72.3* 72.2*   MCHC g/dL 30.0* 28.4* 28.9*   PLATELETS 10*3/mm3 805* 948* 1,075*     Transferrin or Iron Saturation % (Normal 20 - 50 %)  Lab Results   Component Value Date    LABIRON 1 (L) 01/17/2018     TIBC (Normal 250 - 450 ug/dL)  Lab Results   Component Value Date    TIBC 343 01/17/2018     Serum Iron (Normal 38 - 169 ug/dL)  Lab Results   Component Value Date    IRON 4 (L) 01/17/2018     Ferritin (Normal 20.00 - 291.00 ng/mL)  Lab Results   Component Value Date    FERRITIN 34.00 01/17/2018       Results from last 7 days  Lab Units 03/21/18  0659 03/20/18  0517 03/19/18  0701   BUN mg/dL <5* 8* 10   CREATININE mg/dL 0.70 0.70 0.80   AST (SGOT) U/L 11 19 13   ALT (SGPT) U/L 14 16 10   ALK PHOS U/L 148* 167* 175*   BILIRUBIN mg/dL 0.4 0.2* 0.2*   ALBUMIN g/dL 3.00* 3.20 3.80   SODIUM mmol/L 141 137 136   POTASSIUM mmol/L 3.6 3.2* 3.2*   CO2 mmol/L 23.0 20.0 26.0   GLUCOSE mg/dL 85 86 120*   ANION GAP mmol/L 11.0 14.0* 10.0       Results from last 7 days  Lab Units 03/19/18  0701   LIPASE U/L 25     RADIOLOGY:  Imaging Results (last 72 hours)     Procedure Component Value Units Date/Time    CT Abdomen Pelvis With & Without Contrast [[id number]] Collected:  03/19/18 1602     Updated:  03/20/18 0048    Narrative:       EXAM:    CT Abdomen and Pelvis Without and With Intravenous Contrast    EXAM DATE/TIME:    3/19/2018 4:02 PM    CLINICAL HISTORY:    37 years old, female;  Crohn's disease, unspecified, with abscess; Pain;   Abdominal pain; Flank; Left; Additional info: Abn findings on diagnostic   imaging of body structures    TECHNIQUE:    Axial computed tomography images of the abdomen and pelvis without and with   intravenous contrast.  Delayed images through the abdomen and pelvis were   performed.    Oral contrast was administered.    Coronal reformatted images were created and reviewed.    All CT scans at this facility use one or more dose reduction techniques,   viz.: automated exposure control; ma/kV adjustment per patient size (including   targeted exams where dose is matched to indication; i.e. head); or iterative   reconstruction technique.    CONTRAST:    99 mL of isovue 300 administered intravenously.    COMPARISON:    CT ABDOMEN PELVIS W CONTRAST 2018-01-09 14:26    FINDINGS:    Lower thorax:  Mild dependent atelectasis posterior lungs, increased.    Possible trace pleural effusions, slightly greater on the LEFT, new.     ABDOMEN:    Liver:  Unremarkable as visualized.  No mass.    Gallbladder and bile ducts:  Gallbladder unremarkable.  No calcified stones.    No ductal dilation.    Pancreas:  Pancreas unremarkable as visualized.  No ductal dilation.    Spleen:  Unremarkable as visualized.  No splenomegaly.    Adrenals:  Adrenals unremarkable.    Kidneys and ureters:  No hydronephrosis or hydroureter.  No definite renal   cortical solid lesion.  Mild LEFT perinephric fluid greatest inferiorly, likely   reactive secondary to the adjacent mesenteric inflammation/phlegmon.    Stomach and bowel:  No evidence of intestinal obstruction.  Oral contrast   demonstrated throughout the small bowel and throughout the colon.  No evidence   of small bowel wall thickening.    Diffuse wall thickening distal transverse colon and sigmoid colon with the   extent of the wall thickening decreased slightly since previous study with   slightly less of the transverse colon and descending colon  affected.    However, there is increasing phlegmon including several small foci of   extraluminal air medial to the descending colon, overall measuring   approximately at least 6 cm in AP extent, 4 cm in greatest transverse extent,   and extending for at least 9.5 cm in craniocaudal direction.  There are several   areas which suggest early fluid collection although there is no walled off   fluid collection with enhancing rim on post contrast or delayed images.  The   largest possible fluid collection measures approximately 3 cm in greatest   diameter and is at the inferior aspect of the inflammation/phlegmon.    Oral contrast is demonstrated throughout the abnormal colon with no   extravasation of oral contrast demonstrated.  There was an area of likely   extraluminal abscess formation medial to the mid descending colon on the prior   study but the overall extent has increased significantly.    The remainder of the colon appears normal with no other area of wall   thickening.    Appendix:  A normal appendix is identified.  There is no evidence of   distention or periappendiceal inflammation to suggest appendicitis.     PELVIS:    Bladder:  Urinary bladder unremarkable with no calcification or wall   thickening.      Reproductive:  Likely physiologic ovarian cysts/follicles.     ABDOMEN and PELVIS:    Intraperitoneal space:  See above.    Bones/joints:  No acute bone abnormality.    Soft tissues:  Small fat-containing umbilical hernia.    Vasculature:  Unremarkable as visualized.  No abdominal aortic aneurysm.    Lymph nodes:  No significant lymphadenopathy.      Impression:         Diffusely increasing inflammation/phlegmon medial to the descending colon with   extraluminal air and several small areas of likely early fluid/abscess   formation although none are walled off and definitely localized.    Diffuse nonspecific colitis distal transverse and descending colon with the   overall extent of the inflammation  decreasing slightly since 1/9/2018.    No evidence of extravasation of oral contrast diagnostic for fistula.    Mild amount of LEFT perinephric fluid likely reactive as discussed above.  No   abnormality in the LEFT kidney demonstrated.    Possible trace bilateral pleural effusions and increasing adjacent atelectasis   and/or infiltrates, all new since 01/09/2018.    Additional nonacute findings as noted, radiographically stable.    THIS DOCUMENT HAS BEEN ELECTRONICALLY SIGNED BY VICTORINO LAUREN MD    MRI Abdomen Pelvis Enterography [[id number]] Collected:  03/19/18 1134     Updated:  03/19/18 1610    Narrative:       EXAMINATION: MRI ABDOMEN AND PELVIS W/WO CONTRAST ENTEROGRAPHY-      INDICATION: Crohn disease suspected, severe abdominal pain  (fever/vomiting/leukocytosis).     TECHNIQUE: Routine MR imaging was obtained of the abdomen and pelvis pre  and post administration of oral and intravenous contrast according to  the enterography protocol.        COMPARISON: 01/09/2018.     FINDINGS: There is no significant change seen in the appearance of the  distal transverse and descending colon when compared to the prior study  of 01/09/2018. There is abnormal wall thickening identified of the colon  with pericolonic stranding. There is edema identified throughout the  mesentery surrounding the descending colon. There are several areas  concerning for extraluminal air and microperforation in the inflammatory  changes within the left flank. There is surrounding phlegmon and soft  tissue. Findings are concerning for multiloculated abscess with some  small pockets of fluid identified. Consider CT scan for further  evaluation of the changes within the left flank if clinically indicated.  The remainder of the bowel reveals no other gross abnormality. The liver  is homogeneous in appearance. No stones seen in the gallbladder. There  is distention of the stomach. Pancreas is homogeneous. Spleen is  unremarkable. The kidneys and  "adrenal glands within normal limits. No  abdominal or retroperitoneal lymphadenopathy.     PELVIS: The pelvic organs are unremarkable. The pelvic portions of the  gastrointestinal tract are within normal limits. No pelvic adenopathy.  No free fluid or free air. Bony structures are unremarkable. No abnormal  mass or fluid collection is identified.           Impression:       There is abnormal wall thickening again seen of the distal  transverse colon and descending colon with findings concerning for  multiloculated abscess with extraluminal air seen surrounding the  phlegmon and extensive inflammatory changes seen within the mesenteric  fat in the left flank. Consider CT scan for further evaluation     D:  03/19/2018  E:  03/19/2018        This report was finalized on 3/19/2018 4:16 PM by Dr. Erika Hoover MD.           PHYSICAL EXAM:Vital Signs Min/Max for last 24 hours  Temp  Min: 98.3 °F (36.8 °C)  Max: 102.4 °F (39.1 °C)   BP  Min: 110/70  Max: 127/76   Pulse  Min: 74  Max: 99   Resp  Min: 16  Max: 22   SpO2  Min: 97 %  Max: 98 %   Body mass index is 24.77 kg/m².   Flowsheet Rows    Flowsheet Row First Filed Value   Admission Height 172.7 cm (68\") Documented at 03/19/2018 0647   Admission Weight 72.6 kg (160 lb) Documented at 03/19/2018 0647        Wt Readings from Last 5 Encounters:   03/20/18 73.9 kg (162 lb 14.7 oz)   03/13/18 75.3 kg (166 lb)   02/21/18 78.4 kg (172 lb 12.8 oz)   01/29/18 76.7 kg (169 lb)   01/18/18 78.1 kg (172 lb 3.2 oz)       Intake/Output Summary (Last 24 hours) at 03/21/18 1139  Last data filed at 03/21/18 0934   Gross per 24 hour   Intake          2160.83 ml   Output                0 ml   Net          2160.83 ml     ENT Good dentition.  Oral mucosa pink & moist without thrush or lesions.    GI  Abd soft, NT, ND, normal active bowel sounds.  No HSM.  No abd hernia         Patito DRAKE  [facility]--Gastroenterology  [phone]  "

## 2018-03-21 NOTE — PROGRESS NOTES
"  Nutrition Assessment Note     Time: 30min  Patient Name: Sultana Siegel  MRN: [id number]  Admission date: 3/19/2018    Assessment date: 03/21/18 1:26 PM    Assessment      Reason for Visit: MST score     Pertinent Diagnosis:  Active Problems:    Exacerbation of Crohn's disease of large intestine    Abdominal abscess    Exacerbation of Crohn's disease with abscess    Reported/Observed:  Patient reports 8# weight loss in the past month r/t decreased PO intake.  Currently tolerating clear liquids and nutr'l supplement provided on tray.  Eating 100% of meals. Possible colonoscopy tomorrow.     Anthropametrics   Admit height: 68\" Admit weight 165#   UBW: 172# (8# weight loss x 3 weeks) 4.7%    Labs/Procedures:     Results from last 7 days  Lab Units 03/21/18  0659   SODIUM mmol/L 141   POTASSIUM mmol/L 3.6   CHLORIDE mmol/L 107   CO2 mmol/L 23.0   BUN mg/dL <5*   CREATININE mg/dL 0.70   CALCIUM mg/dL 8.5*   BILIRUBIN mg/dL 0.4   ALK PHOS U/L 148*   ALT (SGPT) U/L 14   AST (SGOT) U/L 11   GLUCOSE mg/dL 85     Pertinent Medications: Miralax     Current Nutrition Rx :   Diet Clear Liquid    Active Supplement Orders      Dietary Nutrition Supplements Clear Liquid Supplement    Intake/Delivery:  PO Evaluation    Number of Days PO Intake Evaluated:  2 days    Number of Meals: 4 meals    % of PO Intake:     (80% of clear liquid meals)    MSA:  Meets Criteria for Moderate Malnutrition  PES      Problem/Intervetion:  Nutrition Diagnoses Problem 1   Problem 1: Inadequate Nutrition Intake     Etiology (related to): Clinical Condition, diet order    Signs/Symptoms: Altered GI function, clear liquid diet    (evidence by)    Intervention      Interventions Goal    General: Nutrition Support Treatment    Nutrition Interventions   Supplement provided/adjusted: Clear liquid supplement BID    Encourage PO intake    Follow Treatment progress   Care Plan Reviewed     Evaluation    Per protocol   PO Intake   Supplement intake    Pertinent " labs

## 2018-03-21 NOTE — OP NOTE
Colon and Rectal [CSGA]    COLONOSCOPY  Procedure Note    Sultana Siegel  3/19/2018 - 3/21/2018    Pre-op Diagnosis:   Septic Crohn's colitis of the transverse and left colon  Post-op Diagnosis:     Fixed proximal left colon stricture with normal distal colon and rectum.  Posterior midline anal fistula    Procedure(s):  COLONOSCOPY and biopsies    Surgeon(s):  Mahin Abebe MD    Anesthesia: Versed 9 mg fentanyl 100 µg  Staff:   Monitor/Sedation Nurse: Sofia Kramer RN  Documenter: Anna Varghese RN  Endo Nurse: Serena Linares RN    Estimated Blood Loss: 0  Specimens:                  Order Name Source Comment Collection Info Order Time   TISSUE PATHOLOGY EXAM Large Intestine, Transverse Colon   Inflammatory tissue   Collected By: Mahin Abebe MD 3/21/2018  3:36 PM         Findings: External anal fistula opening directly posterior midline just outside the external sphincter and on digital exam the seems ago through the entire sphincter complex to the dentate line directly posteriorly.  There is no associated abscess and no pus draining.  There is no cellulitis.    The entire rectum ,sigmoid and most the left colon was pristine with no evidence of inflammation.    At about 60 cm, there was an abrupt stricture with no inflammation leading into the stricture.  Gentle pressure on the stricture showed some linear ulcers with islands of normal tissue.  Biopsies were taken of the inflamed area and throughout the left colon sigmoid and rectum.  No polyps were seen.    Impression: Likely chronic stricture and impossible to say if any other biologic would have prevented this.  I recommended to the family that they continue the Humira until we can arrange for an alternative biologic as an outpatient.  Avoiding raw fruits and vegetables since there is no way anything of substance will pass easily through the stricture.  A colon x-ray will be interesting to see how long the stricture is.  Naturally, the problem  with strictures of the colon, especially in inflammatory bowel disease is that there is no way to evaluate for premalignancy or cancers.  Long-term, a subtotal colectomy will likely be in order unless one of the biologics can beat the odds and open this area up.  I discussed the situation with her father.  The other area of concern is her anal fistula.  Ideally I would've liked to have had it healed prior to an abdominal operation .  Fortunately, the fistula is not causing incontinence but because of it, her Biologics will have to be continued indefinitely.    Complications: 0      Mahin Abebe MD     Date: 3/21/2018  Time: 4:01 PM

## 2018-03-21 NOTE — PLAN OF CARE
Problem: Patient Care Overview  Goal: Plan of Care Review  Outcome: Ongoing (interventions implemented as appropriate)   03/21/18 7453   Coping/Psychosocial   Plan of Care Reviewed With patient   OTHER   Outcome Summary Patient still weak and having some nausea. Possible colonoscopy tomorrow   Plan of Care Review   Progress no change       Problem: Fluid Volume Deficit (Adult)  Goal: Identify Related Risk Factors and Signs and Symptoms  Outcome: Ongoing (interventions implemented as appropriate)

## 2018-03-21 NOTE — PROGRESS NOTES
[facility] Progress Note        Antibiotics:  Anti-Infectives     Ordered     Dose/Rate Route Frequency Start Stop    03/20/18 0956  Linezolid (ZYVOX) 600 mg 300 mL     Ordering Provider:  Anuel Dunbar MD    600 mg  300 mL/hr over 60 Minutes Intravenous Every 12 Hours 03/20/18 1200 03/30/18 1159    03/20/18 1017  doxycycline (VIBRAMYCIN) 100 mg/100 mL 0.9% NS MBP     Ordering Provider:  Anuel Dunbar MD    100 mg  over 60 Minutes Intravenous Every 12 Hours Scheduled 03/20/18 1200 03/27/18 0859    03/19/18 1816  fluconazole (DIFLUCAN) IVPB 400 mg     Ordering Provider:  Mahin Abebe MD    400 mg  over 60 Minutes Intravenous Daily 03/19/18 2000 03/22/18 0859    03/19/18 1453  piperacillin-tazobactam (ZOSYN) 3.375 g/100 mL 0.9% NS IVPB (mbp)     Ordering Provider:  Jenn Simpson DO    3.375 g  over 4 Hours Intravenous Every 8 Hours 03/19/18 1800 03/26/18 1759    03/19/18 1043  piperacillin-tazobactam (ZOSYN) 4.5 g/100 mL 0.9% NS IVPB (mbp)     Ordering Provider:  Paulo Blackman MD    4.5 g Intravenous Once 03/19/18 1045 03/19/18 1327    03/19/18 1043  vancomycin IVPB 1500 mg in 0.9% NaCl (Premix) 500 mL     Ordering Provider:  Paulo Blackman MD    20 mg/kg × 72.6 kg Intravenous Once 03/19/18 1045 03/19/18 1325          CC: abd pain    HPI:  Patient is a 37 y.o.  Yr old female with history of Crohn's disease, follows with gastroenterology previously on Remicade and more recently on Humira, immunocompromise host with 2-3 weeks of persistent abdominal pain, fevers/sweats/chills and admission on March 19, 2018; multiple bowel movements over 24 hours after admission, has diarrhea with nausea but no vomiting.  Fever to 103.  No prior surgery, past anal fissure treated by Dr. Skaggs; colorectal surgery team following; MRI of the abdomen on March 19 showed abnormal wall thickening of the distal transverse colon/descending colon with concern for multiloculated abscesses/extraluminal air/phlegmon and extensive  inflammatory changes seen within the mesenteric fat at the left flank.  CT scan showed diffusely increasing inflammation/phlegmon near the descending colon with extraluminal air and concern for possible early fluid/abscess formation although none walled off or definitely localized per radiology.  Diffuse nonspecific colitis of the distal transverse/descending colon noted; no extravasation or fistula formation.  Mild left perinephric fluid noted.  In addition she has primarily dry cough with lower lung changes on CT scan suggesting atelectasis versus infiltrates     3/21/18 Abdominal pain less in general, 5 out of 10, worse with movement and pressure, better with pain meds and nonradiating, more so on the left lower quadrant than otherwise. Less diarrhea and with nausea but no vomiting.  No overt bleeding at present.  No jaundice and no chronic liver disease that she knows of.  Urinating has been uncomfortable but she denies overt hematuria or pyuria.  No new hesitancy urgency or flank pain     No headache photophobia or neck stiffness.  No hemoptysis.  No skin rash.     ROS:      3/21/18    Constitutional-- Appetite diminished with generalized malaise and fatigue  Heent-- No new vision, hearing or throat complaints.  No epistaxis or oral sores.  Denies odynophagia or dysphagia.  No flashers, floaters or eye pain. No odynophagia or dysphagia. No headache, photophobia or neck stiffness.  CV-- No chest pain, palpitation or syncope  Resp-- No SOB/Hemoptysis  GI- No hematochezia, melena, or hematemesis. Denies jaundice or chronic liver disease.  -- No hematuria, or flank pain.  Denies hesitancy, urgency or flank pain.  Lymph- no swollen lymph nodes in neck/axilla or groin.   Heme- No active bruising or bleeding; no Hx of DVT or PE.  MS-- no swelling or pain in the bones or joints of arms/legs.  No new back pain.  Neuro-- No acute focal weakness or numbness in the arms or legs.  No seizures.     Full 12 point review of  "systems reviewed and negative otherwise for acute complaints, except for above      PE:   /73 (BP Location: Left arm, Patient Position: Lying)   Pulse 99   Temp 98.5 °F (36.9 °C) (Oral)   Resp 18   Ht 172.7 cm (68\")   Wt 73.9 kg (162 lb 14.7 oz) Comment: standing scale weight per charting (3/19)  LMP 03/01/2018 (Exact Date)   SpO2 98%   BMI 24.77 kg/m²     GENERAL: Awake and alert, in no acute distress.   HEENT: Normocephalic, atraumatic.  PERRL. EOMI. No conjunctival injection. No icterus. Oropharynx clear without evidence of thrush or exudate. No evidence of peridontal disease.    NECK: Supple without nuchal rigidity. No mass.  LYMPH: No cervical, axillary or inguinal lymphadenopathy.  HEART: RRR; No murmur, rubs, gallops.   LUNGS: Diminished at bases with scattered rhonchi. Normal respiratory effort. Nonlabored. No dullness.  ABDOMEN: Soft, tender and slightly distended. Positive bowel sounds. No rebound or guarding. NO mass or HSM.  EXT:  No cyanosis, clubbing or edema. No cord.  MSK: FROM without joint effusions noted arms/legs.    SKIN: Warm and dry without cutaneous eruptions on Inspection/palpation.    NEURO: Oriented to PPT. No focal deficits on motor/sensory exam at arms/legs.  PSYCHIATRIC: Normal insight and judgement. Cooperative with PE     No peripheral stigmata/phenomena of endocarditis    Laboratory Data      Results from last 7 days  Lab Units 03/21/18  0725 03/20/18  0517 03/19/18  0701   WBC 10*3/mm3 7.44 8.29 7.67   HEMOGLOBIN g/dL 8.0* 6.6* 7.8*   HEMATOCRIT % 26.7* 23.2* 27.0*   PLATELETS 10*3/mm3 805* 948* 1,075*       Results from last 7 days  Lab Units 03/21/18  0659   SODIUM mmol/L 141   POTASSIUM mmol/L 3.6   CHLORIDE mmol/L 107   CO2 mmol/L 23.0   BUN mg/dL <5*   CREATININE mg/dL 0.70   GLUCOSE mg/dL 85   CALCIUM mg/dL 8.5*       Results from last 7 days  Lab Units 03/21/18  0659   ALK PHOS U/L 148*   BILIRUBIN mg/dL 0.4   ALT (SGPT) U/L 14   AST (SGOT) U/L 11       Results " from last 7 days  Lab Units 03/21/18  0725   SED RATE mm/hr 90*       Results from last 7 days  Lab Units 03/21/18  0725   CRP mg/dL 19.86*       Estimated Creatinine Clearance: 127.1 mL/min (by C-G formula based on SCr of 0.7 mg/dL).      Microbiology:      Radiology:  Imaging Results (last 72 hours)     Procedure Component Value Units Date/Time    CT Abdomen Pelvis With & Without Contrast [[id number]] Collected:  03/19/18 1602     Updated:  03/20/18 0048    Narrative:       EXAM:    CT Abdomen and Pelvis Without and With Intravenous Contrast    EXAM DATE/TIME:    3/19/2018 4:02 PM    CLINICAL HISTORY:    37 years old, female; Crohn's disease, unspecified, with abscess; Pain;   Abdominal pain; Flank; Left; Additional info: Abn findings on diagnostic   imaging of body structures    TECHNIQUE:    Axial computed tomography images of the abdomen and pelvis without and with   intravenous contrast.  Delayed images through the abdomen and pelvis were   performed.    Oral contrast was administered.    Coronal reformatted images were created and reviewed.    All CT scans at this facility use one or more dose reduction techniques,   viz.: automated exposure control; ma/kV adjustment per patient size (including   targeted exams where dose is matched to indication; i.e. head); or iterative   reconstruction technique.    CONTRAST:    99 mL of isovue 300 administered intravenously.    COMPARISON:    CT ABDOMEN PELVIS W CONTRAST 2018-01-09 14:26    FINDINGS:    Lower thorax:  Mild dependent atelectasis posterior lungs, increased.    Possible trace pleural effusions, slightly greater on the LEFT, new.     ABDOMEN:    Liver:  Unremarkable as visualized.  No mass.    Gallbladder and bile ducts:  Gallbladder unremarkable.  No calcified stones.    No ductal dilation.    Pancreas:  Pancreas unremarkable as visualized.  No ductal dilation.    Spleen:  Unremarkable as visualized.  No splenomegaly.    Adrenals:  Adrenals unremarkable.     Kidneys and ureters:  No hydronephrosis or hydroureter.  No definite renal   cortical solid lesion.  Mild LEFT perinephric fluid greatest inferiorly, likely   reactive secondary to the adjacent mesenteric inflammation/phlegmon.    Stomach and bowel:  No evidence of intestinal obstruction.  Oral contrast   demonstrated throughout the small bowel and throughout the colon.  No evidence   of small bowel wall thickening.    Diffuse wall thickening distal transverse colon and sigmoid colon with the   extent of the wall thickening decreased slightly since previous study with   slightly less of the transverse colon and descending colon affected.    However, there is increasing phlegmon including several small foci of   extraluminal air medial to the descending colon, overall measuring   approximately at least 6 cm in AP extent, 4 cm in greatest transverse extent,   and extending for at least 9.5 cm in craniocaudal direction.  There are several   areas which suggest early fluid collection although there is no walled off   fluid collection with enhancing rim on post contrast or delayed images.  The   largest possible fluid collection measures approximately 3 cm in greatest   diameter and is at the inferior aspect of the inflammation/phlegmon.    Oral contrast is demonstrated throughout the abnormal colon with no   extravasation of oral contrast demonstrated.  There was an area of likely   extraluminal abscess formation medial to the mid descending colon on the prior   study but the overall extent has increased significantly.    The remainder of the colon appears normal with no other area of wall   thickening.    Appendix:  A normal appendix is identified.  There is no evidence of   distention or periappendiceal inflammation to suggest appendicitis.     PELVIS:    Bladder:  Urinary bladder unremarkable with no calcification or wall   thickening.      Reproductive:  Likely physiologic ovarian cysts/follicles.     ABDOMEN and  PELVIS:    Intraperitoneal space:  See above.    Bones/joints:  No acute bone abnormality.    Soft tissues:  Small fat-containing umbilical hernia.    Vasculature:  Unremarkable as visualized.  No abdominal aortic aneurysm.    Lymph nodes:  No significant lymphadenopathy.      Impression:         Diffusely increasing inflammation/phlegmon medial to the descending colon with   extraluminal air and several small areas of likely early fluid/abscess   formation although none are walled off and definitely localized.    Diffuse nonspecific colitis distal transverse and descending colon with the   overall extent of the inflammation decreasing slightly since 1/9/2018.    No evidence of extravasation of oral contrast diagnostic for fistula.    Mild amount of LEFT perinephric fluid likely reactive as discussed above.  No   abnormality in the LEFT kidney demonstrated.    Possible trace bilateral pleural effusions and increasing adjacent atelectasis   and/or infiltrates, all new since 01/09/2018.    Additional nonacute findings as noted, radiographically stable.    THIS DOCUMENT HAS BEEN ELECTRONICALLY SIGNED BY VICTORINO LAUREN MD    MRI Abdomen Pelvis Enterography [[id number]] Collected:  03/19/18 1134     Updated:  03/19/18 6545    Narrative:       EXAMINATION: MRI ABDOMEN AND PELVIS W/WO CONTRAST ENTEROGRAPHY-      INDICATION: Crohn disease suspected, severe abdominal pain  (fever/vomiting/leukocytosis).     TECHNIQUE: Routine MR imaging was obtained of the abdomen and pelvis pre  and post administration of oral and intravenous contrast according to  the enterography protocol.        COMPARISON: 01/09/2018.     FINDINGS: There is no significant change seen in the appearance of the  distal transverse and descending colon when compared to the prior study  of 01/09/2018. There is abnormal wall thickening identified of the colon  with pericolonic stranding. There is edema identified throughout the  mesentery surrounding the  descending colon. There are several areas  concerning for extraluminal air and microperforation in the inflammatory  changes within the left flank. There is surrounding phlegmon and soft  tissue. Findings are concerning for multiloculated abscess with some  small pockets of fluid identified. Consider CT scan for further  evaluation of the changes within the left flank if clinically indicated.  The remainder of the bowel reveals no other gross abnormality. The liver  is homogeneous in appearance. No stones seen in the gallbladder. There  is distention of the stomach. Pancreas is homogeneous. Spleen is  unremarkable. The kidneys and adrenal glands within normal limits. No  abdominal or retroperitoneal lymphadenopathy.     PELVIS: The pelvic organs are unremarkable. The pelvic portions of the  gastrointestinal tract are within normal limits. No pelvic adenopathy.  No free fluid or free air. Bony structures are unremarkable. No abnormal  mass or fluid collection is identified.           Impression:       There is abnormal wall thickening again seen of the distal  transverse colon and descending colon with findings concerning for  multiloculated abscess with extraluminal air seen surrounding the  phlegmon and extensive inflammatory changes seen within the mesenteric  fat in the left flank. Consider CT scan for further evaluation     D:  03/19/2018  E:  03/19/2018        This report was finalized on 3/19/2018 4:16 PM by Dr. Erika Hoover MD.               Impression:   --Acute sepsis with high fever/tachycardia and presumed intra-abdominal source of infection but also abnormal urinalysis/urine symptoms and possible UTI in addition to potential evolving respiratory source of infection.  Otherwise monitor for specific pathogen.  Empiric broad-spectrum antimicrobials with these concerns in mind.  Workup ongoing.  Resuscitative measures per internal medicine.     --Acute worsening of abdominal  pain/enterocolitis/peritonitis, subacute pain in general associated with constitutional symptoms above and abnormal imaging with evidence for colitis/pericolonic inflammation and phlegmon, potentially evolving abscess including extraluminal air.  Complex process with colorectal surgery following to help define timing/options/threshold for surgical intervention.  No discrete fistula described so far.  Broad intra-abdominal coverage with Zyvox/Zosyn and Diflucan.  Taper or adjust depending on clinical course/study results and response to therapy.  She understands antibiotics are not a guarantee for cure.  High risk for surgical intervention and other serious sequela.     --Acute diarrhea.  CDT negative,  stool studies ordered including culture, O&P.  May have related to oral contrast administration.     --Acute anemia, likely blood loss associated with Crohn's disease/GI tract abnormalities as above     --Acute UTI with uncomfortable urination/bacteriuria and IC host     --Acute cough; abnormal imaging at the lower lobes may represent atelectasis associated with intra-abdominal process.  However could also represent early pneumonia.  Empiric antibiotics ongoing, try to collect sputum and send urine Legionella/strep antigens.  Flu PCR negative.     --Chronic Crohn's disease.  Immunocompromised host    PLAN:   --IV Zyvox/Zosyn/Diflucan, doxycycline     --CRS and GI workup ongoing     --Check/review labs cultures and scans     --History per nursing staff as well     --Discussed with microbiology     --Colorectal surgery following as above     --Highly complex set of issues with high risk for further serious morbidity and other serious sequela    Anuel Dunbar MD  3/21/2018

## 2018-03-21 NOTE — PROGRESS NOTES
[facility] Medicine Services  PROGRESS NOTE    Patient Name: Sultana Siegel  : 1980  MRN: [id number]    Date of Admission: 3/19/2018  Length of Stay: 2  Primary Care Physician: Lori Mcmahon MD    Subjective   Subjective     CC:  crohns flare    HPI:  Pt feels alittle better today still with abd pain. Lying in bed, feels weak today, fever and chills overnight.      Review of Systems  abd pain , fever, chills.     Otherwise ROS is negative except as mentioned in the HPI.    Objective   Objective     Vital Signs:   Temp:  [98.3 °F (36.8 °C)-102.4 °F (39.1 °C)] 98.5 °F (36.9 °C)  Heart Rate:  [77-99] 99  Resp:  [16-22] 18  BP: (110-127)/(68-77) 111/73        Physical Exam:  Constitutional: ill appearing but some better today , awake, alert  Eyes: PERRLA, sclerae anicteric, no conjunctival injection  HENT: NCAT, mucous membranes dry   Neck: Supple, no thyromegaly, no lymphadenopathy, trachea midline  Respiratory: Clear to auscultation bilaterally, nonlabored respirations   Cardiovascular: RRR, no murmurs, rubs, or gallops, palpable pedal pulses bilaterally  Gastrointestinal:decreased bowel sounds, soft,generalized tender more focused on LLQ, minimal distended  Musculoskeletal: No bilateral ankle edema, no clubbing or cyanosis to extremities  Psychiatric: Appropriate affect, cooperative  Neurologic: Oriented x 3, strength symmetric in all extremities, Cranial Nerves grossly intact to confrontation, speech clear  Skin: No rashes    Results Reviewed:  I have personally reviewed current lab, radiology, and data and agree.      Results from last 7 days  Lab Units 18  1326 18  0725 18  0517   WBC 10*3/mm3 9.86 7.44 8.29   HEMOGLOBIN g/dL 7.9* 8.0* 6.6*   HEMATOCRIT % 26.6* 26.7* 23.2*   PLATELETS 10*3/mm3 821* 805* 948*       Results from last 7 days  Lab Units 18  0659 18  0517 18  0701   SODIUM mmol/L 141 137 136   POTASSIUM mmol/L 3.6 3.2* 3.2*    CHLORIDE mmol/L 107 103 100   CO2 mmol/L 23.0 20.0 26.0   BUN mg/dL <5* 8* 10   CREATININE mg/dL 0.70 0.70 0.80   GLUCOSE mg/dL 85 86 120*   CALCIUM mg/dL 8.5* 8.6* 9.2   ALT (SGPT) U/L 14 16 10   AST (SGOT) U/L 11 19 13     Estimated Creatinine Clearance: 127.1 mL/min (by C-G formula based on SCr of 0.7 mg/dL).  No results found for: BNP  No results found for: PHART    Microbiology Results Abnormal     Procedure Component Value - Date/Time    Urine Culture - Urine, Urine, Clean Catch [[id number]]  (Abnormal)  (Susceptibility) Collected:  03/19/18 0797    Lab Status:  Final result Specimen:  Urine from Urine, Clean Catch Updated:  03/21/18 1216     Urine Culture --      <10,000 CFU/mL Klebsiella pneumoniae (A)      20,000-30,000 CFU/mL Normal Urogenital Shante    Susceptibility      Klebsiella pneumoniae     JAMES     Ampicillin >16 ug/ml Resistant     Ampicillin + Sulbactam <=8/4 ug/ml Susceptible     Aztreonam <=8 ug/ml Susceptible     Cefepime <=8 ug/ml Susceptible     Cefotaxime <=2 ug/ml Susceptible     Ceftriaxone <=8 ug/ml Susceptible     Cefuroxime sodium <=4 ug/ml Susceptible     Cephalothin <=8 ug/ml Susceptible     Ertapenem <=1 ug/ml Susceptible     Gentamicin <=4 ug/ml Susceptible     Levofloxacin <=2 ug/ml Susceptible     Meropenem <=1 ug/ml Susceptible     Nitrofurantoin 64 ug/ml Intermediate     Piperacillin + Tazobactam <=16 ug/ml Susceptible     Tetracycline <=4 ug/ml Susceptible     Tobramycin <=4 ug/ml Susceptible     Trimethoprim + Sulfamethoxazole <=2/38 ug/ml Susceptible                    Blood Culture - Blood, [[id number]]  (Normal) Collected:  03/19/18 1133    Lab Status:  Preliminary result Specimen:  Blood from Arm, Right Updated:  03/21/18 1216     Blood Culture No growth at 2 days    Blood Culture - Blood, [[id number]]  (Normal) Collected:  03/19/18 1134    Lab Status:  Preliminary result Specimen:  Blood from Arm, Left Updated:  03/21/18 1216     Blood Culture No growth at 2 days    Stool  Culture - Stool, Per Rectum [[id number]] Collected:  03/20/18 1644    Lab Status:  Preliminary result Specimen:  Stool from Per Rectum Updated:  03/21/18 1138     Stool Culture Culture in progress    Narrative:         Not cultured for Yersinia.    Clostridium Difficile Toxin - Stool, Per Rectum [[id number]] Collected:  03/20/18 1644    Lab Status:  Final result Specimen:  Stool from Per Rectum Updated:  03/20/18 1839    Narrative:       The following orders were created for panel order Clostridium Difficile Toxin - Stool, Per Rectum.  Procedure                               Abnormality         Status                     ---------                               -----------         ------                     Clostridium Difficile To...[[id number]]  Normal              Final result                 Please view results for these tests on the individual orders.    Clostridium Difficile Toxin, PCR - Stool, Per Rectum [[id number]]  (Normal) Collected:  03/20/18 1644    Lab Status:  Final result Specimen:  Stool from Per Rectum Updated:  03/20/18 1839     C. Difficile Toxins by PCR Not Detected    Narrative:         Performance characteristics of test not established for patients <2 years of age.  Negative for Toxigenic C. Difficile    Influenza A & B, RT PCR - Swab, Nasopharynx [[id number]]  (Normal) Collected:  03/19/18 1732    Lab Status:  Final result Specimen:  Swab from Nasopharynx Updated:  03/19/18 1946     Influenza A PCR Not Detected     Influenza B PCR Not Detected          Imaging Results (last 24 hours)     ** No results found for the last 24 hours. **             I have reviewed the medications.    Assessment/Plan   Assessment / Plan     Hospital Problem List     Exacerbation of Crohn's disease of large intestine    Abdominal abscess    Exacerbation of Crohn's disease with abscess             Brief Hospital Course to date:  Sultana Siegel is a 37 y.o. female who is admitted with abdominal pain and fever.  Patient  has  known history of Crohn's disease for over 20 ys.  She was hospitalized in January. At that time She was started on a steroids.  She improved.  After tapering her steroids she became worse. She continued to have a fever and her abdominal began  became worse therefore so she came to emergency room.. MRI today shows wall thickening in the distal transverse and descending colon with multiloculated abscess with extraluminal air seen surrounding phlegmon and extensive inflammatory changes seen in the mesentery fat the left flank. .       Assessment & Plan:  Crohn's Enterocolitis with abscess and phlegmon -  Continue IV abx, Blood cx nega x 2days..  ID / CRS/GI following,  Abx changed to Zyvox/Zosyn/Diflucan/doxy.  Hold Humira and other immunocompromising meds while infection. To restart as infection clears.   --MRI and CT abd reviewed.     --plan for cscopy today with Dr. Abebe, pt aware and understands risks involved with cscopy including perforation. I dW pt and father at bedside.     Abd pain   --dilauidid, zofran prn     Anemia  --s/p transfuse 2 units PRBC's 3/20, pt denies any blood in stool that she knows of.    --continue Serial HH  --peripheral smear pending  --IV Fe added per GI     Hypokalemia  --replace per protocol, follow Mag as well.     Elevated Sed and CRP  --IBD    Flu negative    DVT Prophylaxis: teds/scds      CODE STATUS: Full Code    Disposition: I expect the patient to be discharged home in tbd       Electronically signed by Jenn Simpson DO, 03/21/18, 2:06 PM.

## 2018-03-22 LAB
ANION GAP SERPL CALCULATED.3IONS-SCNC: 11 MMOL/L (ref 3–11)
ANION GAP SERPL CALCULATED.3IONS-SCNC: 9 MMOL/L (ref 3–11)
BACTERIA FLD CULT: NORMAL
BACTERIA SPEC AEROBE CULT: NORMAL
BUN BLD-MCNC: <5 MG/DL (ref 9–23)
BUN BLD-MCNC: <5 MG/DL (ref 9–23)
BUN/CREAT SERPL: ABNORMAL (ref 7–25)
BUN/CREAT SERPL: ABNORMAL (ref 7–25)
CALCIUM SPEC-SCNC: 8 MG/DL (ref 8.7–10.4)
CALCIUM SPEC-SCNC: 8.3 MG/DL (ref 8.7–10.4)
CHLORIDE SERPL-SCNC: 103 MMOL/L (ref 99–109)
CHLORIDE SERPL-SCNC: 106 MMOL/L (ref 99–109)
CO2 SERPL-SCNC: 22 MMOL/L (ref 20–31)
CO2 SERPL-SCNC: 24 MMOL/L (ref 20–31)
CREAT BLD-MCNC: 0.6 MG/DL (ref 0.6–1.3)
CREAT BLD-MCNC: 0.7 MG/DL (ref 0.6–1.3)
DEPRECATED RDW RBC AUTO: 62.7 FL (ref 37–54)
ERYTHROCYTE [DISTWIDTH] IN BLOOD BY AUTOMATED COUNT: 23.4 % (ref 11.3–14.5)
FERRITIN SERPL-MCNC: 229 NG/ML (ref 10–291)
FOLATE SERPL-MCNC: 4.28 NG/ML (ref 3.2–20)
GFR SERPL CREATININE-BSD FRML MDRD: 112 ML/MIN/1.73
GFR SERPL CREATININE-BSD FRML MDRD: 94 ML/MIN/1.73
GLUCOSE BLD-MCNC: 130 MG/DL (ref 70–100)
GLUCOSE BLD-MCNC: 88 MG/DL (ref 70–100)
HCT VFR BLD AUTO: 28.1 % (ref 34.5–44)
HGB BLD-MCNC: 8.4 G/DL (ref 11.5–15.5)
L PNEUMO1 AG UR QL IA: NEGATIVE
Lab: NORMAL
MCH RBC QN AUTO: 22.4 PG (ref 27–31)
MCHC RBC AUTO-ENTMCNC: 29.9 G/DL (ref 32–36)
MCV RBC AUTO: 74.9 FL (ref 80–99)
O+P SPEC MICRO: NORMAL
O+P STL TRI STN: NORMAL
ORGANISM ID: NORMAL
PLATELET # BLD AUTO: 808 10*3/MM3 (ref 150–450)
PMV BLD AUTO: 7.9 FL (ref 6–12)
POTASSIUM BLD-SCNC: 3.6 MMOL/L (ref 3.5–5.5)
POTASSIUM BLD-SCNC: 4.2 MMOL/L (ref 3.5–5.5)
RBC # BLD AUTO: 3.75 10*6/MM3 (ref 3.89–5.14)
S PNEUM AG SPEC QL LA: NEGATIVE
SODIUM BLD-SCNC: 136 MMOL/L (ref 132–146)
SODIUM BLD-SCNC: 139 MMOL/L (ref 132–146)
SPECIMEN SOURCE: NORMAL
VIT B12 BLD-MCNC: >2000 PG/ML (ref 211–911)
WBC NRBC COR # BLD: 9.65 10*3/MM3 (ref 3.5–10.8)

## 2018-03-22 PROCEDURE — 25010000002 NA FERRIC GLUC CPLX PER 12.5 MG: Performed by: NURSE PRACTITIONER

## 2018-03-22 PROCEDURE — 25010000002 PIPERACILLIN-TAZOBACTAM: Performed by: FAMILY MEDICINE

## 2018-03-22 PROCEDURE — 82306 VITAMIN D 25 HYDROXY: CPT | Performed by: INTERNAL MEDICINE

## 2018-03-22 PROCEDURE — 25010000002 LINEZOLID 600 MG/300ML SOLUTION: Performed by: INTERNAL MEDICINE

## 2018-03-22 PROCEDURE — 80048 BASIC METABOLIC PNL TOTAL CA: CPT | Performed by: FAMILY MEDICINE

## 2018-03-22 PROCEDURE — 85027 COMPLETE CBC AUTOMATED: CPT | Performed by: FAMILY MEDICINE

## 2018-03-22 PROCEDURE — 25010000002 HYDROMORPHONE PER 4 MG: Performed by: FAMILY MEDICINE

## 2018-03-22 PROCEDURE — 99233 SBSQ HOSP IP/OBS HIGH 50: CPT | Performed by: FAMILY MEDICINE

## 2018-03-22 PROCEDURE — 99232 SBSQ HOSP IP/OBS MODERATE 35: CPT | Performed by: INTERNAL MEDICINE

## 2018-03-22 RX ORDER — DOXYCYCLINE 100 MG/1
100 CAPSULE ORAL EVERY 12 HOURS SCHEDULED
Status: DISCONTINUED | OUTPATIENT
Start: 2018-03-22 | End: 2018-03-23 | Stop reason: HOSPADM

## 2018-03-22 RX ORDER — LINEZOLID 600 MG/1
600 TABLET, FILM COATED ORAL EVERY 12 HOURS SCHEDULED
Status: DISCONTINUED | OUTPATIENT
Start: 2018-03-22 | End: 2018-03-23

## 2018-03-22 RX ADMIN — DOXYCYCLINE 100 MG: 100 INJECTION, POWDER, LYOPHILIZED, FOR SOLUTION INTRAVENOUS at 12:14

## 2018-03-22 RX ADMIN — HYDROMORPHONE HYDROCHLORIDE 0.5 MG: 10 INJECTION INTRAMUSCULAR; INTRAVENOUS; SUBCUTANEOUS at 19:47

## 2018-03-22 RX ADMIN — POTASSIUM CHLORIDE 40 MEQ: 750 CAPSULE, EXTENDED RELEASE ORAL at 10:20

## 2018-03-22 RX ADMIN — HYDROMORPHONE HYDROCHLORIDE 0.5 MG: 10 INJECTION INTRAMUSCULAR; INTRAVENOUS; SUBCUTANEOUS at 10:05

## 2018-03-22 RX ADMIN — SODIUM CHLORIDE 125 MG: 9 INJECTION, SOLUTION INTRAVENOUS at 10:04

## 2018-03-22 RX ADMIN — PIPERACILLIN SODIUM,TAZOBACTAM SODIUM 3.38 G: 3; .375 INJECTION, POWDER, FOR SOLUTION INTRAVENOUS at 01:20

## 2018-03-22 RX ADMIN — DOXYCYCLINE 100 MG: 100 CAPSULE ORAL at 19:49

## 2018-03-22 RX ADMIN — LINEZOLID 600 MG: 600 INJECTION, SOLUTION INTRAVENOUS at 15:51

## 2018-03-22 RX ADMIN — POTASSIUM CHLORIDE 40 MEQ: 750 CAPSULE, EXTENDED RELEASE ORAL at 16:18

## 2018-03-22 RX ADMIN — LINEZOLID 600 MG: 600 TABLET, FILM COATED ORAL at 19:49

## 2018-03-22 NOTE — PROGRESS NOTES
"GI Daily Progress Note  Subjective     Sultana Siegel is a 37 y.o. female who was admitted with crohn's coliits  Colonoscopy showed stenosis and normal mucosa to the stricture. Fevers are better.  She is feeling better.         Chief Complaint:  abd pain /fever    Objective     /75 (BP Location: Left arm, Patient Position: Lying)   Pulse 83   Temp 99.1 °F (37.3 °C) (Oral)   Resp 18   Ht 172.7 cm (68\")   Wt 73.9 kg (163 lb)   LMP 03/01/2018 (Exact Date)   SpO2 98%   BMI 24.78 kg/m²     Intake/Output last 3 shifts:  I/O last 3 completed shifts:  In: 2730 [P.O.:1920; IV Piggyback:810]  Out: 900 [Urine:900]  Intake/Output this shift:  I/O this shift:  In: 830 [P.O.:720; IV Piggyback:110]  Out: 600 [Urine:600]      Physical Exam  Wt Readings from Last 3 Encounters:   03/21/18 73.9 kg (163 lb)   03/13/18 75.3 kg (166 lb)   02/21/18 78.4 kg (172 lb 12.8 oz)   ,body mass index is 24.78 kg/m².,@FLOWAMB(6)@,@FLOWAMB(5)@,@FLOWAMB(8)@   CONSTITUTIONAL:  Resp CTA; no rhonchi, rales, or wheezes.  Respiration effort normal  CV RRR; no M/R/G. No lower extremity edema  GI Abd soft, mild LLQ ternderness, ND, normal active bowel sounds.    Psych: Good spirits.  Awake and alert    DATA:Results for SULTANA SIEGEL (MRN [id number]) as of 3/22/2018 13:18   Ref. Range 1/18/2018 17:16   Calprotectin, Fecal Latest Ref Range: 0 - 120 ug/g 1030 (H)       Assessment/Plan         Crohn's colitis with stricture    Await gastrograffin enema.  Begin liquid nurtritional support after BE.Study has shown that Lyly/tEnsure as sole source of nutrition is as effective as steroids  Fecal calprotectin suggest Crohn's is still active and not just stricture      Active Problems:    Exacerbation of Crohn's disease of large intestine    Abdominal abscess    Exacerbation of Crohn's disease with abscess       LOS: 3 days     Oscar Alfaro MD  03/22/18  1:16 PM  "

## 2018-03-22 NOTE — PLAN OF CARE
Problem: Fluid Volume Deficit (Adult)  Goal: Identify Related Risk Factors and Signs and Symptoms  Outcome: Ongoing (interventions implemented as appropriate)   03/22/18 0556   Fluid Volume Deficit (Adult)   Related Risk Factors (Fluid Volume Deficit) advanced age   Signs and Symptoms (Fluid Volume Deficit) muscle weakness;dizziness

## 2018-03-22 NOTE — PROGRESS NOTES
[facility] Progress Note        Antibiotics:  Anti-Infectives     Ordered     Dose/Rate Route Frequency Start Stop    03/20/18 0956  Linezolid (ZYVOX) 600 mg 300 mL     Ordering Provider:  Anuel Dunbar MD    600 mg  300 mL/hr over 60 Minutes Intravenous Every 12 Hours 03/20/18 1200 03/30/18 1159    03/20/18 1017  doxycycline (VIBRAMYCIN) 100 mg/100 mL 0.9% NS MBP     Ordering Provider:  Anuel Dunbar MD    100 mg  over 60 Minutes Intravenous Every 12 Hours Scheduled 03/20/18 1200 03/27/18 0859    03/19/18 1816  fluconazole (DIFLUCAN) IVPB 400 mg     Ordering Provider:  Mahin Abebe MD    400 mg  over 60 Minutes Intravenous Daily 03/19/18 2000 03/21/18 1035    03/19/18 1453  piperacillin-tazobactam (ZOSYN) 3.375 g/100 mL 0.9% NS IVPB (mbp)     Ordering Provider:  Jenn Simpson DO    3.375 g  over 4 Hours Intravenous Every 8 Hours 03/19/18 1800 03/26/18 1759    03/19/18 1043  piperacillin-tazobactam (ZOSYN) 4.5 g/100 mL 0.9% NS IVPB (mbp)     Ordering Provider:  Paulo Blackman MD    4.5 g Intravenous Once 03/19/18 1045 03/19/18 1327    03/19/18 1043  vancomycin IVPB 1500 mg in 0.9% NaCl (Premix) 500 mL     Ordering Provider:  Paulo Blackman MD    20 mg/kg × 72.6 kg Intravenous Once 03/19/18 1045 03/19/18 1325          CC: abd pain    HPI:  Patient is a 37 y.o.  Yr old female with history of Crohn's disease, follows with gastroenterology previously on Remicade and more recently on Humira, immunocompromise host with 2-3 weeks of persistent abdominal pain, fevers/sweats/chills and admission on March 19, 2018; multiple bowel movements over 24 hours after admission, has diarrhea with nausea but no vomiting.  Fever to 103.  No prior surgery, past anal fissure treated by Dr. Skaggs; colorectal surgery team following; MRI of the abdomen on March 19 showed abnormal wall thickening of the distal transverse colon/descending colon with concern for multiloculated abscesses/extraluminal air/phlegmon and extensive  inflammatory changes seen within the mesenteric fat at the left flank.  CT scan showed diffusely increasing inflammation/phlegmon near the descending colon with extraluminal air and concern for possible early fluid/abscess formation although none walled off or definitely localized per radiology.  Diffuse nonspecific colitis of the distal transverse/descending colon noted; no extravasation or fistula formation.  Mild left perinephric fluid noted.  In addition she has primarily dry cough with lower lung changes on CT scan suggesting atelectasis versus infiltrates     3/22/18 Abdominal pain less in general since admit, 5 out of 10, worse with movement and pressure, better with pain meds and nonradiating, more so on the left lower quadrant than otherwise. Less diarrhea and less nausea and no vomiting.  No overt bleeding at present.  No jaundice and no chronic liver disease that she knows of.  Urinating has been uncomfortable but she denies overt hematuria or pyuria.  No new hesitancy urgency or flank pain     No headache photophobia or neck stiffness.  No hemoptysis.  No skin rash.     ROS:      3/22/18    Constitutional-- Appetite diminished with generalized malaise and fatigue  Heent-- No new vision, hearing or throat complaints.  No epistaxis or oral sores.  Denies odynophagia or dysphagia.  No flashers, floaters or eye pain. No odynophagia or dysphagia. No headache, photophobia or neck stiffness.  CV-- No chest pain, palpitation or syncope  Resp-- No SOB/Hemoptysis  GI- No hematochezia, melena, or hematemesis. Denies jaundice or chronic liver disease.  -- No hematuria, or flank pain.  Denies hesitancy, urgency or flank pain.  Lymph- no swollen lymph nodes in neck/axilla or groin.   Heme- No active bruising or bleeding; no Hx of DVT or PE.  MS-- no swelling or pain in the bones or joints of arms/legs.  No new back pain.  Neuro-- No acute focal weakness or numbness in the arms or legs.  No seizures.     Full 12  "point review of systems reviewed and negative otherwise for acute complaints, except for above      PE:   /75 (BP Location: Left arm, Patient Position: Lying)   Pulse 83   Temp 99.1 °F (37.3 °C) (Oral)   Resp 18   Ht 172.7 cm (68\")   Wt 73.9 kg (163 lb)   LMP 03/01/2018 (Exact Date)   SpO2 98%   BMI 24.78 kg/m²     GENERAL: Awake and alert, in no acute distress.   HEENT: Normocephalic, atraumatic.  PERRL. EOMI. No conjunctival injection. No icterus. Oropharynx clear without evidence of thrush or exudate. No evidence of peridontal disease.    NECK: Supple without nuchal rigidity. No mass.  LYMPH: No cervical, axillary or inguinal lymphadenopathy.  HEART: RRR; No murmur, rubs, gallops.   LUNGS: Diminished at bases with scattered rhonchi. Normal respiratory effort. Nonlabored. No dullness.  ABDOMEN: Soft, tender and slightly distended. Positive bowel sounds. No rebound or guarding. NO mass or HSM.  EXT:  No cyanosis, clubbing or edema. No cord.  MSK: FROM without joint effusions noted arms/legs.    SKIN: Warm and dry without cutaneous eruptions on Inspection/palpation.    NEURO: Oriented to PPT. No focal deficits on motor/sensory exam at arms/legs.  PSYCHIATRIC: Normal insight and judgement. Cooperative with PE     No peripheral stigmata/phenomena of endocarditis    Laboratory Data      Results from last 7 days  Lab Units 03/22/18  0750 03/21/18  1326 03/21/18  0725   WBC 10*3/mm3 9.65 9.86 7.44   HEMOGLOBIN g/dL 8.4* 7.9* 8.0*   HEMATOCRIT % 28.1* 26.6* 26.7*   PLATELETS 10*3/mm3 808* 821* 805*       Results from last 7 days  Lab Units 03/22/18  0750   SODIUM mmol/L 136   POTASSIUM mmol/L 3.6   CHLORIDE mmol/L 103   CO2 mmol/L 24.0   BUN mg/dL <5*   CREATININE mg/dL 0.70   GLUCOSE mg/dL 88   CALCIUM mg/dL 8.3*       Results from last 7 days  Lab Units 03/21/18  1326   ALK PHOS U/L 132*   BILIRUBIN mg/dL 0.3   ALT (SGPT) U/L 10   AST (SGOT) U/L 7       Results from last 7 days  Lab Units 03/21/18  0725 "   SED RATE mm/hr 90*       Results from last 7 days  Lab Units 03/21/18  0725   CRP mg/dL 19.86*       Estimated Creatinine Clearance: 127.1 mL/min (by C-G formula based on SCr of 0.7 mg/dL).      Microbiology:      Radiology:  Imaging Results (last 72 hours)     Procedure Component Value Units Date/Time    CT Abdomen Pelvis With & Without Contrast [[id number]] Collected:  03/19/18 1602     Updated:  03/20/18 0048    Narrative:       EXAM:    CT Abdomen and Pelvis Without and With Intravenous Contrast    EXAM DATE/TIME:    3/19/2018 4:02 PM    CLINICAL HISTORY:    37 years old, female; Crohn's disease, unspecified, with abscess; Pain;   Abdominal pain; Flank; Left; Additional info: Abn findings on diagnostic   imaging of body structures    TECHNIQUE:    Axial computed tomography images of the abdomen and pelvis without and with   intravenous contrast.  Delayed images through the abdomen and pelvis were   performed.    Oral contrast was administered.    Coronal reformatted images were created and reviewed.    All CT scans at this facility use one or more dose reduction techniques,   viz.: automated exposure control; ma/kV adjustment per patient size (including   targeted exams where dose is matched to indication; i.e. head); or iterative   reconstruction technique.    CONTRAST:    99 mL of isovue 300 administered intravenously.    COMPARISON:    CT ABDOMEN PELVIS W CONTRAST 2018-01-09 14:26    FINDINGS:    Lower thorax:  Mild dependent atelectasis posterior lungs, increased.    Possible trace pleural effusions, slightly greater on the LEFT, new.     ABDOMEN:    Liver:  Unremarkable as visualized.  No mass.    Gallbladder and bile ducts:  Gallbladder unremarkable.  No calcified stones.    No ductal dilation.    Pancreas:  Pancreas unremarkable as visualized.  No ductal dilation.    Spleen:  Unremarkable as visualized.  No splenomegaly.    Adrenals:  Adrenals unremarkable.    Kidneys and ureters:  No hydronephrosis or  hydroureter.  No definite renal   cortical solid lesion.  Mild LEFT perinephric fluid greatest inferiorly, likely   reactive secondary to the adjacent mesenteric inflammation/phlegmon.    Stomach and bowel:  No evidence of intestinal obstruction.  Oral contrast   demonstrated throughout the small bowel and throughout the colon.  No evidence   of small bowel wall thickening.    Diffuse wall thickening distal transverse colon and sigmoid colon with the   extent of the wall thickening decreased slightly since previous study with   slightly less of the transverse colon and descending colon affected.    However, there is increasing phlegmon including several small foci of   extraluminal air medial to the descending colon, overall measuring   approximately at least 6 cm in AP extent, 4 cm in greatest transverse extent,   and extending for at least 9.5 cm in craniocaudal direction.  There are several   areas which suggest early fluid collection although there is no walled off   fluid collection with enhancing rim on post contrast or delayed images.  The   largest possible fluid collection measures approximately 3 cm in greatest   diameter and is at the inferior aspect of the inflammation/phlegmon.    Oral contrast is demonstrated throughout the abnormal colon with no   extravasation of oral contrast demonstrated.  There was an area of likely   extraluminal abscess formation medial to the mid descending colon on the prior   study but the overall extent has increased significantly.    The remainder of the colon appears normal with no other area of wall   thickening.    Appendix:  A normal appendix is identified.  There is no evidence of   distention or periappendiceal inflammation to suggest appendicitis.     PELVIS:    Bladder:  Urinary bladder unremarkable with no calcification or wall   thickening.      Reproductive:  Likely physiologic ovarian cysts/follicles.     ABDOMEN and PELVIS:    Intraperitoneal space:  See  above.    Bones/joints:  No acute bone abnormality.    Soft tissues:  Small fat-containing umbilical hernia.    Vasculature:  Unremarkable as visualized.  No abdominal aortic aneurysm.    Lymph nodes:  No significant lymphadenopathy.      Impression:         Diffusely increasing inflammation/phlegmon medial to the descending colon with   extraluminal air and several small areas of likely early fluid/abscess   formation although none are walled off and definitely localized.    Diffuse nonspecific colitis distal transverse and descending colon with the   overall extent of the inflammation decreasing slightly since 1/9/2018.    No evidence of extravasation of oral contrast diagnostic for fistula.    Mild amount of LEFT perinephric fluid likely reactive as discussed above.  No   abnormality in the LEFT kidney demonstrated.    Possible trace bilateral pleural effusions and increasing adjacent atelectasis   and/or infiltrates, all new since 01/09/2018.    Additional nonacute findings as noted, radiographically stable.    THIS DOCUMENT HAS BEEN ELECTRONICALLY SIGNED BY VICTORINO LAUREN MD    MRI Abdomen Pelvis Enterography [[id number]] Collected:  03/19/18 1134     Updated:  03/19/18 1610    Narrative:       EXAMINATION: MRI ABDOMEN AND PELVIS W/WO CONTRAST ENTEROGRAPHY-      INDICATION: Crohn disease suspected, severe abdominal pain  (fever/vomiting/leukocytosis).     TECHNIQUE: Routine MR imaging was obtained of the abdomen and pelvis pre  and post administration of oral and intravenous contrast according to  the enterography protocol.        COMPARISON: 01/09/2018.     FINDINGS: There is no significant change seen in the appearance of the  distal transverse and descending colon when compared to the prior study  of 01/09/2018. There is abnormal wall thickening identified of the colon  with pericolonic stranding. There is edema identified throughout the  mesentery surrounding the descending colon. There are several  areas  concerning for extraluminal air and microperforation in the inflammatory  changes within the left flank. There is surrounding phlegmon and soft  tissue. Findings are concerning for multiloculated abscess with some  small pockets of fluid identified. Consider CT scan for further  evaluation of the changes within the left flank if clinically indicated.  The remainder of the bowel reveals no other gross abnormality. The liver  is homogeneous in appearance. No stones seen in the gallbladder. There  is distention of the stomach. Pancreas is homogeneous. Spleen is  unremarkable. The kidneys and adrenal glands within normal limits. No  abdominal or retroperitoneal lymphadenopathy.     PELVIS: The pelvic organs are unremarkable. The pelvic portions of the  gastrointestinal tract are within normal limits. No pelvic adenopathy.  No free fluid or free air. Bony structures are unremarkable. No abnormal  mass or fluid collection is identified.           Impression:       There is abnormal wall thickening again seen of the distal  transverse colon and descending colon with findings concerning for  multiloculated abscess with extraluminal air seen surrounding the  phlegmon and extensive inflammatory changes seen within the mesenteric  fat in the left flank. Consider CT scan for further evaluation     D:  03/19/2018  E:  03/19/2018        This report was finalized on 3/19/2018 4:16 PM by Dr. Erika Hoover MD.               Impression:   --Acute sepsis with high fever/tachycardia and presumed intra-abdominal source of infection but also abnormal urinalysis/urine symptoms and possible UTI in addition to potential evolving respiratory source of infection.  Otherwise monitor for specific pathogen.  Empiric broad-spectrum antimicrobials with these concerns in mind.  Workup ongoing.  Resuscitative measures per internal medicine.     --Acute worsening of abdominal pain/enterocolitis/peritonitis, subacute pain in general  associated with constitutional symptoms above and abnormal imaging with evidence for colitis/pericolonic inflammation and phlegmon, potentially evolving abscess including extraluminal air.  Complex process with colorectal surgery following to help define timing/options/threshold for surgical intervention.  No discrete fistula described so far.  Broad intra-abdominal coverage with Zyvox/Zosyn;  S/p diflucan.  Taper or adjust depending on clinical course/study results and response to therapy.  She understands antibiotics are not a guarantee for cure.  High risk for surgical intervention and other serious sequela.     --Acute diarrhea.  CDT negative,  stool studies ordered including culture, O&P.  May have related to oral contrast administration.     --Acute anemia, likely blood loss associated with Crohn's disease/GI tract abnormalities as above     --Acute UTI with uncomfortable urination/bacteriuria and IC host     --Acute cough; abnormal imaging at the lower lobes may represent atelectasis associated with intra-abdominal process.  However could also represent early pneumonia.  Empiric antibiotics ongoing, try to collect sputum and send urine Legionella/strep antigens.  Flu PCR negative.     --Chronic Crohn's disease.  Immunocompromised host    PLAN:   --IV Zyvox/Zosyn, doxycycline     --CRS and GI workup ongoing     --Check/review labs cultures and scans     --History per nursing staff as well     --Discussed with microbiology     --Colorectal surgery colonoscopy 3.21 with colonic stricture and anal fistula     --Highly complex set of issues with high risk for further serious morbidity and other serious sequela    Anuel Dunbar MD  3/22/2018

## 2018-03-22 NOTE — PROGRESS NOTES
"Colon and Rectal [[facility]]    HD 3    /75 (BP Location: Left arm, Patient Position: Lying)   Pulse 83   Temp 99.1 °F (37.3 °C) (Oral)   Resp 18   Ht 172.7 cm (68\")   Wt 73.9 kg (163 lb)   LMP 03/01/2018 (Exact Date)   SpO2 98%   BMI 24.78 kg/m²     Lab Results (last 24 hours)     Procedure Component Value Units Date/Time    Basic Metabolic Panel [[id number]]  (Abnormal) Collected:  03/22/18 1627    Specimen:  Blood Updated:  03/22/18 1801     Glucose 130 (H) mg/dL      BUN <5 (L) mg/dL      Creatinine 0.60 mg/dL      Sodium 139 mmol/L      Potassium 4.2 mmol/L      Chloride 106 mmol/L      CO2 22.0 mmol/L      Calcium 8.0 (L) mg/dL      eGFR Non African Amer 112 mL/min/1.73      BUN/Creatinine Ratio --     Comment: Unable to calculate Bun/Crea Ratio.        Anion Gap 11.0 mmol/L     Narrative:       National [employer] Guidelines    Stage     Description        GFR  1         Normal or High     90+  2         Mild decrease      60-89  3         Moderate decrease  30-59  4         Severe decrease    15-29  5         Kidney failure     <15    Legionella Antigen, Urine - Urine, Urine, Clean Catch [[id number]] Collected:  03/20/18 1645    Specimen:  Urine from Urine, Clean Catch Updated:  03/22/18 1516     L. pneumophila Serogp 1 Ur Ag Negative     Comment: Presumptive negative for L. pneumophila serogroup 1 antigen in urine,  suggesting no recent or current infection. Legionnaires' disease  cannot be ruled out since other serogroups and species may also cause  disease.       Narrative:       Performed at:  [address] [place] [address]  [id number]  : Juan El MD, Phone:  [phone]    S. Pneumo Ag Urine or CSF - Urine, Urine, Clean Catch [[id number]] Collected:  03/20/18 1645    Specimen:  Urine from Urine, Clean Catch Updated:  03/22/18 1516     Specimen Source Urine     STREP PNEUMONIAE ANTIGEN Negative     Body Fluid Culture, Sterile Not Indicated     " Organism ID Not indicated.     Please note Comment     Comment: College [employer] standards require a culture to be  performed on CSF specimens submitted for bacterial antigen testing.  (CAP [id number]) Urine specimens will not be cultured.       Narrative:       Performed at:  [address]  [id number]  : Juan El MD, Phone:  [phone]    Ova & Parasite Examination - Stool, Per Rectum [[id number]] Collected:  03/20/18 1644    Specimen:  Stool from Per Rectum Updated:  03/22/18 1304     Ova + Parasite Exam No ova or parasites seen on concentrated smear     Trichrome Stain No ova or parasites seen on trichrome stain    Blood Culture - Blood, [[id number]]  (Normal) Collected:  03/19/18 1133    Specimen:  Blood from Arm, Right Updated:  03/22/18 1216     Blood Culture No growth at 3 days    Blood Culture - Blood, [[id number]]  (Normal) Collected:  03/19/18 1134    Specimen:  Blood from Arm, Left Updated:  03/22/18 1216     Blood Culture No growth at 3 days    Stool Culture - Stool, Per Rectum [[id number]] Collected:  03/20/18 1644    Specimen:  Stool from Per Rectum Updated:  03/22/18 1144     Stool Culture No Salmonella, Shigella, Campylobacter, E. coli O157, or Vibrio isolated    Narrative:         Not cultured for Yersinia.    Basic Metabolic Panel [[id number]]  (Abnormal) Collected:  03/22/18 0750    Specimen:  Blood Updated:  03/22/18 0921     Glucose 88 mg/dL      BUN <5 (L) mg/dL      Creatinine 0.70 mg/dL      Sodium 136 mmol/L      Potassium 3.6 mmol/L      Chloride 103 mmol/L      CO2 24.0 mmol/L      Calcium 8.3 (L) mg/dL      eGFR Non African Amer 94 mL/min/1.73      BUN/Creatinine Ratio --     Comment: Unable to calculate Bun/Crea Ratio.        Anion Gap 9.0 mmol/L     Narrative:       National Kidney Foundation Guidelines    Stage     Description        GFR  1         Normal or High     90+  2         Mild decrease      60-89  3          Moderate decrease  30-59  4         Severe decrease    15-29  5         Kidney failure     <15    CBC (No Diff) [[id number]]  (Abnormal) Collected:  03/22/18 0750    Specimen:  Blood Updated:  03/22/18 0828     WBC 9.65 10*3/mm3      RBC 3.75 (L) 10*6/mm3      Hemoglobin 8.4 (L) g/dL      Hematocrit 28.1 (L) %      MCV 74.9 (L) fL      MCH 22.4 (L) pg      MCHC 29.9 (L) g/dL      RDW 23.4 (H) %      RDW-SD 62.7 (H) fl      MPV 7.9 fL      Platelets 808 (H) 10*3/mm3     Tissue Pathology Exam [[id number]] Collected:  03/21/18 1535    Specimen:  Tissue from Large Intestine, Transverse Colon; Tissue from Large Intestine, Rectum Updated:  03/22/18 0818          I/O this shift:  In: 2270 [P.O.:2160; IV Piggyback:110]  Out: 900 [Urine:900]    Alert and oriented.  No nausea or vomiting.  Good pain control  Good UO. Labs stable  I had ordered her colon x-ray on Wednesday but I must of erred in my computer skills.  Unfortunately we have to wait until tomorrow to look at her length of stricture and extent of disease.  Stable post op course.  Pathology pending.    Order Name Source Comment Collection Info Order Time   TISSUE PATHOLOGY EXAM Large Intestine, Transverse Colon   Inflammatory tissue   Collected By: Mahin Abebe MD 3/21/2018  3:36 PM   .    Mahin Abebe MD  03/22/18  6:19 PM

## 2018-03-22 NOTE — PROGRESS NOTES
[facility] Medicine Services  PROGRESS NOTE    Patient Name: Sultana Siegel  : 1980  MRN: [id number]    Date of Admission: 3/19/2018  Length of Stay: 3  Primary Care Physician: Lori Mcmahon MD    Subjective   Subjective     CC:  crohns flare    HPI:  Pt sitting up in bed, feels better, had cscopy yesterday.     Review of Systems  abd pain , fever, chills.     Otherwise ROS is negative except as mentioned in the HPI.    Objective   Objective     Vital Signs:   Temp:  [98.1 °F (36.7 °C)-99.1 °F (37.3 °C)] 99.1 °F (37.3 °C)  Heart Rate:  [67-83] 83  Resp:  [16-18] 18  BP: (117-123)/(75-85) 117/75        Physical Exam:  Constitutional: ill appearing but still better today , awake, alert  Eyes: PERRLA, sclerae anicteric, no conjunctival injection  HENT: NCAT, mucous membranes dry   Neck: Supple, no thyromegaly, no lymphadenopathy, trachea midline  Respiratory: Clear to auscultation bilaterally, nonlabored respirations   Cardiovascular: RRR, no murmurs, rubs, or gallops, palpable pedal pulses bilaterally  Gastrointestinal:decreased bowel sounds, soft,marked less tender.  Musculoskeletal: No bilateral ankle edema, no clubbing or cyanosis to extremities  Psychiatric: Appropriate affect, cooperative  Neurologic: Oriented x 3, strength symmetric in all extremities, Cranial Nerves grossly intact to confrontation, speech clear  Skin: No rashes    Results Reviewed:  I have personally reviewed current lab, radiology, and data and agree.      Results from last 7 days  Lab Units 18  0750 18  1326 18  0725   WBC 10*3/mm3 9.65 9.86 7.44   HEMOGLOBIN g/dL 8.4* 7.9* 8.0*   HEMATOCRIT % 28.1* 26.6* 26.7*   PLATELETS 10*3/mm3 808* 821* 805*       Results from last 7 days  Lab Units 18  0750 18  1326 18  0659 18  0517   SODIUM mmol/L 136 138 141 137   POTASSIUM mmol/L 3.6 4.2 3.6 3.2*   CHLORIDE mmol/L 103 108 107 103   CO2 mmol/L 24.0 22.0 23.0 20.0   BUN  mg/dL <5* <5* <5* 8*   CREATININE mg/dL 0.70 0.70 0.70 0.70   GLUCOSE mg/dL 88 81 85 86   CALCIUM mg/dL 8.3* 8.0* 8.5* 8.6*   ALT (SGPT) U/L  --  10 14 16   AST (SGOT) U/L  --  7 11 19     Estimated Creatinine Clearance: 127.1 mL/min (by C-G formula based on SCr of 0.7 mg/dL).  No results found for: BNP  No results found for: PHART    Microbiology Results Abnormal     Procedure Component Value - Date/Time    Legionella Antigen, Urine - Urine, Urine, Clean Catch [[id number]] Collected:  03/20/18 1645    Lab Status:  Final result Specimen:  Urine from Urine, Clean Catch Updated:  03/22/18 1516     L. pneumophila Serogp 1 Ur Ag Negative     Comment: Presumptive negative for L. pneumophila serogroup 1 antigen in urine,  suggesting no recent or current infection. Legionnaires' disease  cannot be ruled out since other serogroups and species may also cause  disease.       Narrative:       Performed at:  [address]  [id number]  : Juan El MD, Phone:  [phone]    S. Pneumo Ag Urine or CSF - Urine, Urine, Clean Catch [[id number]] Collected:  03/20/18 1645    Lab Status:  Final result Specimen:  Urine from Urine, Clean Catch Updated:  03/22/18 1516     Specimen Source Urine     STREP PNEUMONIAE ANTIGEN Negative     Body Fluid Culture, Sterile Not Indicated     Organism ID Not indicated.     Please note Comment     Comment: [employer] standards require a culture to be  performed on CSF specimens submitted for bacterial antigen testing.  (CAP [id number]) Urine specimens will not be cultured.       Narrative:       Performed at:  [address]  [id number]  : Juan El MD, Phone:  [phone]    Ova & Parasite Examination - Stool, Per Rectum [[id number]] Collected:  03/20/18 1644    Lab Status:  Final result Specimen:  Stool from Per Rectum Updated:  03/22/18 1304     Ova + Parasite  Exam No ova or parasites seen on concentrated smear     Trichrome Stain No ova or parasites seen on trichrome stain    Blood Culture - Blood, [[id number]]  (Normal) Collected:  03/19/18 1133    Lab Status:  Preliminary result Specimen:  Blood from Arm, Right Updated:  03/22/18 1216     Blood Culture No growth at 3 days    Blood Culture - Blood, [[id number]]  (Normal) Collected:  03/19/18 1134    Lab Status:  Preliminary result Specimen:  Blood from Arm, Left Updated:  03/22/18 1216     Blood Culture No growth at 3 days    Stool Culture - Stool, Per Rectum [[id number]] Collected:  03/20/18 1644    Lab Status:  Final result Specimen:  Stool from Per Rectum Updated:  03/22/18 1144     Stool Culture No Salmonella, Shigella, Campylobacter, E. coli O157, or Vibrio isolated    Narrative:         Not cultured for Yersinia.    Urine Culture - Urine, Urine, Clean Catch [[id number]]  (Abnormal)  (Susceptibility) Collected:  03/19/18 0738    Lab Status:  Final result Specimen:  Urine from Urine, Clean Catch Updated:  03/21/18 1216     Urine Culture --      <10,000 CFU/mL Klebsiella pneumoniae (A)      20,000-30,000 CFU/mL Normal Urogenital Shante    Susceptibility      Klebsiella pneumoniae     JAMES     Ampicillin >16 ug/ml Resistant     Ampicillin + Sulbactam <=8/4 ug/ml Susceptible     Aztreonam <=8 ug/ml Susceptible     Cefepime <=8 ug/ml Susceptible     Cefotaxime <=2 ug/ml Susceptible     Ceftriaxone <=8 ug/ml Susceptible     Cefuroxime sodium <=4 ug/ml Susceptible     Cephalothin <=8 ug/ml Susceptible     Ertapenem <=1 ug/ml Susceptible     Gentamicin <=4 ug/ml Susceptible     Levofloxacin <=2 ug/ml Susceptible     Meropenem <=1 ug/ml Susceptible     Nitrofurantoin 64 ug/ml Intermediate     Piperacillin + Tazobactam <=16 ug/ml Susceptible     Tetracycline <=4 ug/ml Susceptible     Tobramycin <=4 ug/ml Susceptible     Trimethoprim + Sulfamethoxazole <=2/38 ug/ml Susceptible                    Clostridium Difficile Toxin - Stool,  Per Rectum [[id number]] Collected:  03/20/18 1644    Lab Status:  Final result Specimen:  Stool from Per Rectum Updated:  03/20/18 1839    Narrative:       The following orders were created for panel order Clostridium Difficile Toxin - Stool, Per Rectum.  Procedure                               Abnormality         Status                     ---------                               -----------         ------                     Clostridium Difficile To...[[id number]]  Normal              Final result                 Please view results for these tests on the individual orders.    Clostridium Difficile Toxin, PCR - Stool, Per Rectum [[id number]]  (Normal) Collected:  03/20/18 1644    Lab Status:  Final result Specimen:  Stool from Per Rectum Updated:  03/20/18 1839     C. Difficile Toxins by PCR Not Detected    Narrative:         Performance characteristics of test not established for patients <2 years of age.  Negative for Toxigenic C. Difficile    Influenza A & B, RT PCR - Swab, Nasopharynx [[id number]]  (Normal) Collected:  03/19/18 1732    Lab Status:  Final result Specimen:  Swab from Nasopharynx Updated:  03/19/18 1946     Influenza A PCR Not Detected     Influenza B PCR Not Detected          Imaging Results (last 24 hours)     ** No results found for the last 24 hours. **             I have reviewed the medications.    Assessment/Plan   Assessment / Plan     Hospital Problem List     Exacerbation of Crohn's disease of large intestine    Abdominal abscess    Exacerbation of Crohn's disease with abscess             Brief Hospital Course to date:  Sultana Siegel is a 37 y.o. female who is admitted with abdominal pain and fever.  Patient has  known history of Crohn's disease for over 20 ys.  She was hospitalized in January. At that time She was started on a steroids.  She improved.  After tapering her steroids she became worse. She continued to have a fever and her abdominal began  became worse therefore so she came to  emergency room.. MRI today shows wall thickening in the distal transverse and descending colon with multiloculated abscess with extraluminal air seen surrounding phlegmon and extensive inflammatory changes seen in the mesentery fat the left flank. .       Assessment & Plan:  Crohn's Enterocolitis with abscess and phlegmon -  Continue IV Zosyn  per ID, will change Zyvox and Doxy to oral when she can take oral.  Hopefully she can be sent home on orals.  I have DW DR Sellers.  Blood cx nega x 2days..  ID / CRS/GI following,   Hold Humira and other immunocompromising meds while infection. To be restarted as infection clears.   --MRI and CT abd reviewed.     --s/p Cscopy by  Dr. Abebe, tolerated procedure,     Abd pain   --dilauidid, zofran prn     Anemia  --s/p transfuse 2 units PRBC's 3/20, pt denies any blood in stool that she knows of.    --continue Serial HH  --peripheral smear positive for hemochromic microcytic anemia   --IV Fe added per GI     Hypokalemia  --replace per protocol, follow Mag as well.     Elevated Sed and CRP  --IBD    Flu negative    DVT Prophylaxis: teds/scds      CODE STATUS: Full Code    Disposition: I expect the patient to be discharged home in tbd       Electronically signed by Jenn Simpson DO, 03/22/18, 4:06 PM.

## 2018-03-22 NOTE — CONSULTS
Adult Nutrition  Assessment/PES    Patient Name:  Sultana Siegel  YOB: 1980  MRN: [id number]  Admit Date:  3/19/2018    Assessment Date:  3/22/2018      Time: 45 minutes  Reason for Visit: Physician consult; NPO/Cl protocol    Pertinent Diagnosis:  Active Problems:    Exacerbation of Crohn's disease of large intestine    Abdominal abscess    Exacerbation of Crohn's disease with abscess    Per GI MD note (3/22): Crohn's colitis with stricture. Await gastrograffin enema.  Begin liquid nurtritional support after BE.Study has shown that Lyly/tEnsure as sole source of nutrition is as effective as steroids Fecal calprotectin suggest Crohn's is still active and not just stricture    Reported/Observed: Patient reports she is currently tolerating clear liquids, drinks 1/2 carton of 1 Ensure Clear daily - tastes too sweet for her. Inquired about Crohn's disease diet education.    Anthropometrics:  Height: 68 in    Weight: 165 lbs (standing wt per nsg documentation)    Labs/Procedures:  Reviewed     Pertinent Medications: Reviewed    Current Nutrition Rx :   Diet Clear Liquid    Active Supplement Orders      Dietary Nutrition Supplements Clear Liquid Supplement  Patient isn't on Tube Feeding    Intake/Delivery:  PO Evaluation    Number of Days PO Intake Evaluated:   Number of Meals: 3   % of PO Intake: 83      Problem/Interventions:          Problem 3     Row Name 03/22/18 1402       Nutrition Diagnoses Problem 3    Problem 3 Knowledge Deficit    Etiology (related to) Medical Diagnosis    Gastrointestinal Crohn's disease    Signs/Symptoms (evidenced by) Reported Information Deficit              Problem 4     Row Name 03/22/18 1402       Nutrition Diagnoses Problem 4    Problem 4 Inadequate Intake/Infusion    Inadequate Intake Type Oral    Etiology (related to) --   clinical condition, current diet order    Signs/Symptoms (evidenced by) Clear Liquid Diet   with consumption of only 1/2 of Ensure Clear carton  daily (per pt)              Intervention Goal     Row Name 03/22/18 1403       Intervention Goal    General Nutrition support treatment;Provide information regarding MNT for treatment/condition    PO Increase intake;Advance diet   as medically appropriate            Nutrition Intervention     Row Name 03/22/18 1404       Nutrition Intervention    RD/Tech Action Care plan reviewd;Follow Tx progress;Encourage intake;Interview for preference              Education/Evaluation     Row Name 03/22/18 1404       Education    Education Provided education regarding;Education topics   RD provided verbal edu/written materials to pt regarding Crohn's disease. Pt verbalized understanding and asked appropriate QS.    Provided education regarding Diet rationale    Education Topics GI disease       Monitor/Evaluation    Monitor Per protocol;PO intake;Supplement intake;Weight        Electronically signed by:  Erika Morrow RD  03/22/18 2:10 PM

## 2018-03-22 NOTE — PLAN OF CARE
Problem: Patient Care Overview  Goal: Plan of Care Review  Outcome: Ongoing (interventions implemented as appropriate)   03/22/18 8264   Coping/Psychosocial   Plan of Care Reviewed With patient   OTHER   Outcome Summary Patient rested well last night and states she is feeling some better   Plan of Care Review   Progress improving       Problem: Fluid Volume Deficit (Adult)  Goal: Identify Related Risk Factors and Signs and Symptoms  Outcome: Ongoing (interventions implemented as appropriate)

## 2018-03-23 ENCOUNTER — APPOINTMENT (OUTPATIENT)
Dept: GENERAL RADIOLOGY | Facility: HOSPITAL | Age: 38
End: 2018-03-23
Attending: COLON & RECTAL SURGERY

## 2018-03-23 VITALS
TEMPERATURE: 98.1 F | OXYGEN SATURATION: 97 % | HEART RATE: 72 BPM | SYSTOLIC BLOOD PRESSURE: 110 MMHG | WEIGHT: 163 LBS | BODY MASS INDEX: 24.71 KG/M2 | RESPIRATION RATE: 18 BRPM | DIASTOLIC BLOOD PRESSURE: 75 MMHG | HEIGHT: 68 IN

## 2018-03-23 LAB
25(OH)D3 SERPL-MCNC: 26.2 NG/ML
CYTO UR: NORMAL
DEPRECATED RDW RBC AUTO: 64.7 FL (ref 37–54)
ERYTHROCYTE [DISTWIDTH] IN BLOOD BY AUTOMATED COUNT: 24.3 % (ref 11.3–14.5)
HCT VFR BLD AUTO: 29.5 % (ref 34.5–44)
HGB BLD-MCNC: 8.6 G/DL (ref 11.5–15.5)
LAB AP CASE REPORT: NORMAL
LAB AP CLINICAL INFORMATION: NORMAL
Lab: NORMAL
MCH RBC QN AUTO: 22.2 PG (ref 27–31)
MCHC RBC AUTO-ENTMCNC: 29.2 G/DL (ref 32–36)
MCV RBC AUTO: 76.2 FL (ref 80–99)
METHYLMALONATE SERPL-SCNC: 57 NMOL/L (ref 0–378)
PATH REPORT.FINAL DX SPEC: NORMAL
PATH REPORT.GROSS SPEC: NORMAL
PLATELET # BLD AUTO: 847 10*3/MM3 (ref 150–450)
PMV BLD AUTO: 8.3 FL (ref 6–12)
RBC # BLD AUTO: 3.87 10*6/MM3 (ref 3.89–5.14)
WBC NRBC COR # BLD: 7.97 10*3/MM3 (ref 3.5–10.8)
ZINC SERPL-MCNC: 57 UG/DL (ref 56–134)

## 2018-03-23 PROCEDURE — 0 DIATRIZOATE MEGLUMINE & SODIUM PER 1 ML: Performed by: INTERNAL MEDICINE

## 2018-03-23 PROCEDURE — 85027 COMPLETE CBC AUTOMATED: CPT | Performed by: FAMILY MEDICINE

## 2018-03-23 PROCEDURE — 25010000002 ERTAPENEM: Performed by: INTERNAL MEDICINE

## 2018-03-23 PROCEDURE — 99239 HOSP IP/OBS DSCHRG MGMT >30: CPT | Performed by: INTERNAL MEDICINE

## 2018-03-23 PROCEDURE — 25010000002 NA FERRIC GLUC CPLX PER 12.5 MG: Performed by: NURSE PRACTITIONER

## 2018-03-23 PROCEDURE — 25010000002 HYDROMORPHONE PER 4 MG: Performed by: FAMILY MEDICINE

## 2018-03-23 PROCEDURE — 74270 X-RAY XM COLON 1CNTRST STD: CPT

## 2018-03-23 RX ORDER — DOXYCYCLINE 100 MG/1
100 CAPSULE ORAL EVERY 12 HOURS SCHEDULED
Qty: 14 CAPSULE | Refills: 0 | Status: SHIPPED | OUTPATIENT
Start: 2018-03-23 | End: 2018-03-31 | Stop reason: HOSPADM

## 2018-03-23 RX ADMIN — ERTAPENEM SODIUM 1 G: 1 INJECTION, POWDER, LYOPHILIZED, FOR SOLUTION INTRAMUSCULAR; INTRAVENOUS at 17:02

## 2018-03-23 RX ADMIN — DOXYCYCLINE 100 MG: 100 CAPSULE ORAL at 08:57

## 2018-03-23 RX ADMIN — HYDROMORPHONE HYDROCHLORIDE 0.5 MG: 10 INJECTION INTRAMUSCULAR; INTRAVENOUS; SUBCUTANEOUS at 11:26

## 2018-03-23 RX ADMIN — HYDROMORPHONE HYDROCHLORIDE 0.5 MG: 10 INJECTION INTRAMUSCULAR; INTRAVENOUS; SUBCUTANEOUS at 17:02

## 2018-03-23 RX ADMIN — SODIUM CHLORIDE 125 MG: 9 INJECTION, SOLUTION INTRAVENOUS at 08:57

## 2018-03-23 RX ADMIN — DIATRIZOATE MEGLUMINE AND DIATRIZOATE SODIUM 480 ML: 660; 100 LIQUID ORAL; RECTAL at 10:41

## 2018-03-23 NOTE — PLAN OF CARE
Problem: Patient Care Overview  Goal: Plan of Care Review  Outcome: Ongoing (interventions implemented as appropriate)   03/23/18 0642   Coping/Psychosocial   Plan of Care Reviewed With patient   OTHER   Outcome Summary Patient has rested well tonight and had no complaints of any kind. The patient is on room air and her vital signs are stable.    Plan of Care Review   Progress improving       Problem: Fluid Volume Deficit (Adult)  Goal: Identify Related Risk Factors and Signs and Symptoms  Outcome: Ongoing (interventions implemented as appropriate)    Goal: Optimal Fluid Balance  Outcome: Ongoing (interventions implemented as appropriate)

## 2018-03-23 NOTE — PROGRESS NOTES
[facility] Progress Note        Antibiotics:  Anti-Infectives     Ordered     Dose/Rate Route Frequency Start Stop    03/22/18 1751  piperacillin-tazobactam (ZOSYN) 3.375 g in iso-osmotic dextrose 50 ml (premix)     Ordering Provider:  Oscar Alfaro MD    3.375 g  over 4 Hours Intravenous Every 8 Hours 03/23/18 2000 03/27/18 1959 03/22/18 1617  doxycycline (MONODOX) capsule 100 mg     Ordering Provider:  Jenn Simpson DO    100 mg Oral Every 12 Hours Scheduled 03/22/18 2100 03/29/18 2059 03/19/18 1816  fluconazole (DIFLUCAN) IVPB 400 mg     Ordering Provider:  Mahin Abebe MD    400 mg  over 60 Minutes Intravenous Daily 03/19/18 2000 03/21/18 1035    03/19/18 1043  piperacillin-tazobactam (ZOSYN) 4.5 g/100 mL 0.9% NS IVPB (mbp)     Ordering Provider:  Paulo Blackman MD    4.5 g Intravenous Once 03/19/18 1045 03/19/18 1327    03/19/18 1043  vancomycin IVPB 1500 mg in 0.9% NaCl (Premix) 500 mL     Ordering Provider:  Paulo Blackman MD    20 mg/kg × 72.6 kg Intravenous Once 03/19/18 1045 03/19/18 1325          CC: abd pain    HPI:  Patient is a 37 y.o.  Yr old female with history of Crohn's disease, follows with gastroenterology previously on Remicade and more recently on Humira, immunocompromise host with 2-3 weeks of persistent abdominal pain, fevers/sweats/chills and admission on March 19, 2018; multiple bowel movements over 24 hours after admission, has diarrhea with nausea but no vomiting.  Fever to 103.  No prior surgery, past anal fissure treated by Dr. Skaggs; colorectal surgery team following; MRI of the abdomen on March 19 showed abnormal wall thickening of the distal transverse colon/descending colon with concern for multiloculated abscesses/extraluminal air/phlegmon and extensive inflammatory changes seen within the mesenteric fat at the left flank.  CT scan showed diffusely increasing inflammation/phlegmon near the descending colon with extraluminal air and concern for possible early  fluid/abscess formation although none walled off or definitely localized per radiology.  Diffuse nonspecific colitis of the distal transverse/descending colon noted; no extravasation or fistula formation.  Mild left perinephric fluid noted.  In addition she has primarily dry cough with lower lung changes on CT scan suggesting atelectasis versus infiltrates     3/23/18 Abdominal pain less in general since admit, 2-3 out of 10, worse with movement and pressure, better with pain meds and nonradiating, more so on the left lower quadrant than otherwise. No diarrhea and less nausea and no vomiting.  No overt bleeding at present.  No jaundice and no chronic liver disease that she knows of.  Urinating less uncomfortable and she denies overt hematuria or pyuria.  No new hesitancy urgency or flank pain     No headache photophobia or neck stiffness.  No hemoptysis.  No skin rash.     ROS:      3/23/18    Constitutional-- Appetite diminished with generalized malaise and fatigue  Heent-- No new vision, hearing or throat complaints.  No epistaxis or oral sores.  Denies odynophagia or dysphagia.  No flashers, floaters or eye pain. No odynophagia or dysphagia. No headache, photophobia or neck stiffness.  CV-- No chest pain, palpitation or syncope  Resp-- No SOB/Hemoptysis  GI- No hematochezia, melena, or hematemesis. Denies jaundice or chronic liver disease.  -- No hematuria, or flank pain.  Denies hesitancy, urgency or flank pain.  Lymph- no swollen lymph nodes in neck/axilla or groin.   Heme- No active bruising or bleeding; no Hx of DVT or PE.  MS-- no swelling or pain in the bones or joints of arms/legs.  No new back pain.  Neuro-- No acute focal weakness or numbness in the arms or legs.  No seizures.     Full 12 point review of systems reviewed and negative otherwise for acute complaints, except for above      PE:   /75 (BP Location: Right arm, Patient Position: Lying)   Pulse 72   Temp 98.1 °F (36.7 °C) (Oral)    "Resp 18   Ht 172.7 cm (68\")   Wt 73.9 kg (163 lb)   LMP 03/01/2018 (Exact Date)   SpO2 97%   BMI 24.78 kg/m²     GENERAL: Awake and alert, in no acute distress.   HEENT: Normocephalic, atraumatic.  PERRL. EOMI. No conjunctival injection. No icterus. Oropharynx clear without evidence of thrush or exudate. No evidence of peridontal disease.    NECK: Supple without nuchal rigidity. No mass.  LYMPH: No cervical, axillary or inguinal lymphadenopathy.  HEART: RRR; No murmur, rubs, gallops.   LUNGS: Diminished at bases with scattered rhonchi. Normal respiratory effort. Nonlabored. No dullness.  ABDOMEN: Soft, tender and slightly distended. Positive bowel sounds. No rebound or guarding. NO mass or HSM.  EXT:  No cyanosis, clubbing or edema. No cord.  MSK: FROM without joint effusions noted arms/legs.    SKIN: Warm and dry without cutaneous eruptions on Inspection/palpation.    NEURO: Oriented to PPT. No focal deficits on motor/sensory exam at arms/legs.  PSYCHIATRIC: Normal insight and judgement. Cooperative with PE     No peripheral stigmata/phenomena of endocarditis    Laboratory Data      Results from last 7 days  Lab Units 03/23/18  0501 03/22/18  0750 03/21/18  1326   WBC 10*3/mm3 7.97 9.65 9.86   HEMOGLOBIN g/dL 8.6* 8.4* 7.9*   HEMATOCRIT % 29.5* 28.1* 26.6*   PLATELETS 10*3/mm3 847* 808* 821*       Results from last 7 days  Lab Units 03/22/18  1627   SODIUM mmol/L 139   POTASSIUM mmol/L 4.2   CHLORIDE mmol/L 106   CO2 mmol/L 22.0   BUN mg/dL <5*   CREATININE mg/dL 0.60   GLUCOSE mg/dL 130*   CALCIUM mg/dL 8.0*       Results from last 7 days  Lab Units 03/21/18  1326   ALK PHOS U/L 132*   BILIRUBIN mg/dL 0.3   ALT (SGPT) U/L 10   AST (SGOT) U/L 7       Results from last 7 days  Lab Units 03/21/18  0725   SED RATE mm/hr 90*       Results from last 7 days  Lab Units 03/21/18  0725   CRP mg/dL 19.86*       Estimated Creatinine Clearance: 148.3 mL/min (by C-G formula based on SCr of 0.6 " mg/dL).      Microbiology:      Radiology:  Imaging Results (last 72 hours)     Procedure Component Value Units Date/Time    CT Abdomen Pelvis With & Without Contrast [[id number]] Collected:  03/19/18 1602     Updated:  03/20/18 0048    Narrative:       EXAM:    CT Abdomen and Pelvis Without and With Intravenous Contrast    EXAM DATE/TIME:    3/19/2018 4:02 PM    CLINICAL HISTORY:    37 years old, female; Crohn's disease, unspecified, with abscess; Pain;   Abdominal pain; Flank; Left; Additional info: Abn findings on diagnostic   imaging of body structures    TECHNIQUE:    Axial computed tomography images of the abdomen and pelvis without and with   intravenous contrast.  Delayed images through the abdomen and pelvis were   performed.    Oral contrast was administered.    Coronal reformatted images were created and reviewed.    All CT scans at this facility use one or more dose reduction techniques,   viz.: automated exposure control; ma/kV adjustment per patient size (including   targeted exams where dose is matched to indication; i.e. head); or iterative   reconstruction technique.    CONTRAST:    99 mL of isovue 300 administered intravenously.    COMPARISON:    CT ABDOMEN PELVIS W CONTRAST 2018-01-09 14:26    FINDINGS:    Lower thorax:  Mild dependent atelectasis posterior lungs, increased.    Possible trace pleural effusions, slightly greater on the LEFT, new.     ABDOMEN:    Liver:  Unremarkable as visualized.  No mass.    Gallbladder and bile ducts:  Gallbladder unremarkable.  No calcified stones.    No ductal dilation.    Pancreas:  Pancreas unremarkable as visualized.  No ductal dilation.    Spleen:  Unremarkable as visualized.  No splenomegaly.    Adrenals:  Adrenals unremarkable.    Kidneys and ureters:  No hydronephrosis or hydroureter.  No definite renal   cortical solid lesion.  Mild LEFT perinephric fluid greatest inferiorly, likely   reactive secondary to the adjacent mesenteric  inflammation/phlegmon.    Stomach and bowel:  No evidence of intestinal obstruction.  Oral contrast   demonstrated throughout the small bowel and throughout the colon.  No evidence   of small bowel wall thickening.    Diffuse wall thickening distal transverse colon and sigmoid colon with the   extent of the wall thickening decreased slightly since previous study with   slightly less of the transverse colon and descending colon affected.    However, there is increasing phlegmon including several small foci of   extraluminal air medial to the descending colon, overall measuring   approximately at least 6 cm in AP extent, 4 cm in greatest transverse extent,   and extending for at least 9.5 cm in craniocaudal direction.  There are several   areas which suggest early fluid collection although there is no walled off   fluid collection with enhancing rim on post contrast or delayed images.  The   largest possible fluid collection measures approximately 3 cm in greatest   diameter and is at the inferior aspect of the inflammation/phlegmon.    Oral contrast is demonstrated throughout the abnormal colon with no   extravasation of oral contrast demonstrated.  There was an area of likely   extraluminal abscess formation medial to the mid descending colon on the prior   study but the overall extent has increased significantly.    The remainder of the colon appears normal with no other area of wall   thickening.    Appendix:  A normal appendix is identified.  There is no evidence of   distention or periappendiceal inflammation to suggest appendicitis.     PELVIS:    Bladder:  Urinary bladder unremarkable with no calcification or wall   thickening.      Reproductive:  Likely physiologic ovarian cysts/follicles.     ABDOMEN and PELVIS:    Intraperitoneal space:  See above.    Bones/joints:  No acute bone abnormality.    Soft tissues:  Small fat-containing umbilical hernia.    Vasculature:  Unremarkable as visualized.  No abdominal  aortic aneurysm.    Lymph nodes:  No significant lymphadenopathy.      Impression:         Diffusely increasing inflammation/phlegmon medial to the descending colon with   extraluminal air and several small areas of likely early fluid/abscess   formation although none are walled off and definitely localized.    Diffuse nonspecific colitis distal transverse and descending colon with the   overall extent of the inflammation decreasing slightly since 1/9/2018.    No evidence of extravasation of oral contrast diagnostic for fistula.    Mild amount of LEFT perinephric fluid likely reactive as discussed above.  No   abnormality in the LEFT kidney demonstrated.    Possible trace bilateral pleural effusions and increasing adjacent atelectasis   and/or infiltrates, all new since 01/09/2018.    Additional nonacute findings as noted, radiographically stable.    THIS DOCUMENT HAS BEEN ELECTRONICALLY SIGNED BY VICTORINO LAUREN MD    MRI Abdomen Pelvis Enterography [[id number]] Collected:  03/19/18 1134     Updated:  03/19/18 1619    Narrative:       EXAMINATION: MRI ABDOMEN AND PELVIS W/WO CONTRAST ENTEROGRAPHY-      INDICATION: Crohn disease suspected, severe abdominal pain  (fever/vomiting/leukocytosis).     TECHNIQUE: Routine MR imaging was obtained of the abdomen and pelvis pre  and post administration of oral and intravenous contrast according to  the enterography protocol.        COMPARISON: 01/09/2018.     FINDINGS: There is no significant change seen in the appearance of the  distal transverse and descending colon when compared to the prior study  of 01/09/2018. There is abnormal wall thickening identified of the colon  with pericolonic stranding. There is edema identified throughout the  mesentery surrounding the descending colon. There are several areas  concerning for extraluminal air and microperforation in the inflammatory  changes within the left flank. There is surrounding phlegmon and soft  tissue. Findings are  concerning for multiloculated abscess with some  small pockets of fluid identified. Consider CT scan for further  evaluation of the changes within the left flank if clinically indicated.  The remainder of the bowel reveals no other gross abnormality. The liver  is homogeneous in appearance. No stones seen in the gallbladder. There  is distention of the stomach. Pancreas is homogeneous. Spleen is  unremarkable. The kidneys and adrenal glands within normal limits. No  abdominal or retroperitoneal lymphadenopathy.     PELVIS: The pelvic organs are unremarkable. The pelvic portions of the  gastrointestinal tract are within normal limits. No pelvic adenopathy.  No free fluid or free air. Bony structures are unremarkable. No abnormal  mass or fluid collection is identified.           Impression:       There is abnormal wall thickening again seen of the distal  transverse colon and descending colon with findings concerning for  multiloculated abscess with extraluminal air seen surrounding the  phlegmon and extensive inflammatory changes seen within the mesenteric  fat in the left flank. Consider CT scan for further evaluation     D:  03/19/2018  E:  03/19/2018        This report was finalized on 3/19/2018 4:16 PM by Dr. Erika Hoover MD.               Impression:   --Acute sepsis with high fever/tachycardia and presumed intra-abdominal source of infection but also abnormal urinalysis/urine symptoms and possible UTI in addition to potential evolving respiratory source of infection.  Otherwise monitor for specific pathogen.  Empiric broad-spectrum antimicrobials with these concerns in mind.  Workup ongoing.  Resuscitative measures per internal medicine.Sepsis improved     --Acute worsening of abdominal pain/enterocolitis/peritonitis, subacute pain in general associated with constitutional symptoms above and abnormal imaging with evidence for colitis/pericolonic inflammation and phlegmon, potentially evolving abscess  including extraluminal air.  Complex process with colorectal surgery following to help define timing/options/threshold for surgical intervention.  No discrete fistula described so far.   no MDR GPC in culture, Zyvox stopped.  Broad intra-abdominal coverage with Zosyn;  S/p diflucan.  Taper or adjust depending on clinical course/study results and response to therapy.  She understands antibiotics are not a guarantee for cure.  High risk for surgical intervention and other serious sequela.     --Acute diarrhea.  CDT negative,  stool studies ordered including culture, O&P.  May have related to oral contrast administration.     --Acute anemia, likely blood loss associated with Crohn's disease/GI tract abnormalities as above     --Acute UTI with uncomfortable urination/bacteriuria and IC host     --Acute cough; abnormal imaging at the lower lobes may represent atelectasis associated with intra-abdominal process.  However could also represent early pneumonia.  Empiric antibiotics ongoing, try to collect sputum and send urine Legionella/strep antigens.  Flu PCR negative.     --Chronic Crohn's disease.  Immunocompromised host    PLAN:   --IV Zosyn, doxycycline     --CRS and GI workup ongoing     --Check/review labs cultures and scans     --History per nursing staff as well     --Discussed with microbiology     --Colorectal surgery colonoscopy 3/21 with colonic stricture and anal fistula     --Highly complex set of issues with high risk for further serious morbidity and other serious sequela    --Discussed with Dr. Andrae Dunbar MD  3/23/2018

## 2018-03-23 NOTE — PROGRESS NOTES
"    [facility] Medicine Services  PROGRESS NOTE    Patient Name: Sultana Siegel  : 1980  MRN: [id number]    Date of Admission: 3/19/2018  Length of Stay: 4  Primary Care Physician: Lori Mcmahon MD    Subjective   Subjective     CC:  crohns flare    HPI:  Pt sitting up on couch at bedside, attempting to eat clears for breakfast.  Denies any issues overnight, some flatus but not much due to \"I haven't had much to eat.\"  Pt states that she got nauseated after getting oral abx yesterday (? Doxy).    Review of Systems  Gen- No fevers, chills  CV- No chest pain, palpitations  Resp- No cough, dyspnea  GI- No N/V/D, abd pain    Otherwise ROS is negative except as mentioned in the HPI.    Objective   Objective     Vital Signs:   Temp:  [98.1 °F (36.7 °C)-99.2 °F (37.3 °C)] 98.1 °F (36.7 °C)  Heart Rate:  [72] 72  Resp:  [18] 18  BP: (110-119)/(75-78) 110/75        Physical Exam:  Constitutional: ill appearing, awake, alert  Eyes: PERRLA, sclerae anicteric, no conjunctival injection  HENT: NCAT, mucous membranes dry   Neck: Supple, no thyromegaly, no lymphadenopathy, trachea midline  Respiratory: Clear to auscultation bilaterally, nonlabored respirations   Cardiovascular: RRR, no murmurs, rubs, or gallops, palpable pedal pulses bilaterally  Gastrointestinal:decreased bowel sounds, soft, Nt, ND  Musculoskeletal: No bilateral ankle edema, no clubbing or cyanosis to extremities  Psychiatric: Appropriate affect, cooperative  Neurologic: Oriented x 3, strength symmetric in all extremities, Cranial Nerves grossly intact to confrontation, speech clear  Skin: No rashes    Results Reviewed:  I have personally reviewed current lab, radiology, and data and agree.      Results from last 7 days  Lab Units 18  0501 18  0750 18  1326   WBC 10*3/mm3 7.97 9.65 9.86   HEMOGLOBIN g/dL 8.6* 8.4* 7.9*   HEMATOCRIT % 29.5* 28.1* 26.6*   PLATELETS 10*3/mm3 847* 808* 821*       Results from last " 7 days  Lab Units 03/22/18  1627 03/22/18  0750 03/21/18  1326 03/21/18  0659 03/20/18  0517   SODIUM mmol/L 139 136 138 141 137   POTASSIUM mmol/L 4.2 3.6 4.2 3.6 3.2*   CHLORIDE mmol/L 106 103 108 107 103   CO2 mmol/L 22.0 24.0 22.0 23.0 20.0   BUN mg/dL <5* <5* <5* <5* 8*   CREATININE mg/dL 0.60 0.70 0.70 0.70 0.70   GLUCOSE mg/dL 130* 88 81 85 86   CALCIUM mg/dL 8.0* 8.3* 8.0* 8.5* 8.6*   ALT (SGPT) U/L  --   --  10 14 16   AST (SGOT) U/L  --   --  7 11 19     Estimated Creatinine Clearance: 148.3 mL/min (by C-G formula based on SCr of 0.6 mg/dL).  No results found for: BNP  No results found for: PHART    Microbiology Results Abnormal     Procedure Component Value - Date/Time    Legionella Antigen, Urine - Urine, Urine, Clean Catch [[id number]] Collected:  03/20/18 1645    Lab Status:  Final result Specimen:  Urine from Urine, Clean Catch Updated:  03/22/18 1516     L. pneumophila Serogp 1 Ur Ag Negative     Comment: Presumptive negative for L. pneumophila serogroup 1 antigen in urine,  suggesting no recent or current infection. Legionnaires' disease  cannot be ruled out since other serogroups and species may also cause  disease.       Narrative:       Performed at:  [address] [place] [address]  [id number]  : Juan El MD, Phone:  [phone]    S. Pneumo Ag Urine or CSF - Urine, Urine, Clean Catch [[id number]] Collected:  03/20/18 1645    Lab Status:  Final result Specimen:  Urine from Urine, Clean Catch Updated:  03/22/18 1516     Specimen Source Urine     STREP PNEUMONIAE ANTIGEN Negative     Body Fluid Culture, Sterile Not Indicated     Organism ID Not indicated.     Please note Comment     Comment: College of [employer] Pathologists standards require a culture to be  performed on CSF specimens submitted for bacterial antigen testing.  (CAP [id number]) Urine specimens will not be cultured.       Narrative:       Performed at:  [address] [place] [address]  [place]  [id number]  : Juan El MD, Phone:  [phone]    Ova & Parasite Examination - Stool, Per Rectum [[id number]] Collected:  03/20/18 1644    Lab Status:  Final result Specimen:  Stool from Per Rectum Updated:  03/22/18 1304     Ova + Parasite Exam No ova or parasites seen on concentrated smear     Trichrome Stain No ova or parasites seen on trichrome stain    Blood Culture - Blood, [[id number]]  (Normal) Collected:  03/19/18 1133    Lab Status:  Preliminary result Specimen:  Blood from Arm, Right Updated:  03/22/18 1216     Blood Culture No growth at 3 days    Blood Culture - Blood, [[id number]]  (Normal) Collected:  03/19/18 1134    Lab Status:  Preliminary result Specimen:  Blood from Arm, Left Updated:  03/22/18 1216     Blood Culture No growth at 3 days    Stool Culture - Stool, Per Rectum [[id number]] Collected:  03/20/18 1644    Lab Status:  Final result Specimen:  Stool from Per Rectum Updated:  03/22/18 1144     Stool Culture No Salmonella, Shigella, Campylobacter, E. coli O157, or Vibrio isolated    Narrative:         Not cultured for Yersinia.    Urine Culture - Urine, Urine, Clean Catch [[id number]]  (Abnormal)  (Susceptibility) Collected:  03/19/18 0738    Lab Status:  Final result Specimen:  Urine from Urine, Clean Catch Updated:  03/21/18 1216     Urine Culture --      <10,000 CFU/mL Klebsiella pneumoniae (A)      20,000-30,000 CFU/mL Normal Urogenital Shante    Susceptibility      Klebsiella pneumoniae     JAMES     Ampicillin >16 ug/ml Resistant     Ampicillin + Sulbactam <=8/4 ug/ml Susceptible     Aztreonam <=8 ug/ml Susceptible     Cefepime <=8 ug/ml Susceptible     Cefotaxime <=2 ug/ml Susceptible     Ceftriaxone <=8 ug/ml Susceptible     Cefuroxime sodium <=4 ug/ml Susceptible     Cephalothin <=8 ug/ml Susceptible     Ertapenem <=1 ug/ml Susceptible     Gentamicin <=4 ug/ml Susceptible     Levofloxacin <=2 ug/ml Susceptible     Meropenem <=1 ug/ml Susceptible      Nitrofurantoin 64 ug/ml Intermediate     Piperacillin + Tazobactam <=16 ug/ml Susceptible     Tetracycline <=4 ug/ml Susceptible     Tobramycin <=4 ug/ml Susceptible     Trimethoprim + Sulfamethoxazole <=2/38 ug/ml Susceptible                    Clostridium Difficile Toxin - Stool, Per Rectum [[id number]] Collected:  03/20/18 1644    Lab Status:  Final result Specimen:  Stool from Per Rectum Updated:  03/20/18 1839    Narrative:       The following orders were created for panel order Clostridium Difficile Toxin - Stool, Per Rectum.  Procedure                               Abnormality         Status                     ---------                               -----------         ------                     Clostridium Difficile To...[[id number]]  Normal              Final result                 Please view results for these tests on the individual orders.    Clostridium Difficile Toxin, PCR - Stool, Per Rectum [[id number]]  (Normal) Collected:  03/20/18 1644    Lab Status:  Final result Specimen:  Stool from Per Rectum Updated:  03/20/18 1839     C. Difficile Toxins by PCR Not Detected    Narrative:         Performance characteristics of test not established for patients <2 years of age.  Negative for Toxigenic C. Difficile    Influenza A & B, RT PCR - Swab, Nasopharynx [[id number]]  (Normal) Collected:  03/19/18 1732    Lab Status:  Final result Specimen:  Swab from Nasopharynx Updated:  03/19/18 1946     Influenza A PCR Not Detected     Influenza B PCR Not Detected          Imaging Results (last 24 hours)     ** No results found for the last 24 hours. **             I have reviewed the medications.    Assessment/Plan   Assessment / Plan     Hospital Problem List     Exacerbation of Crohn's disease of large intestine    Abdominal abscess    Exacerbation of Crohn's disease with abscess             Brief Hospital Course to date:  Sultana Siegel is a 37 y.o. female who is admitted with abdominal pain and fever.  Patient has   known history of Crohn's disease for over 20 ys.  She was hospitalized in January. At that time,she was started on steroids.  She improved.  After tapering her steroids she became worse. She continued to have a fever and her abdominal became worse, therefore, she came to emergency room. MRI on admission showed wall thickening in the distal transverse and descending colon with multiloculated abscess with extraluminal air seen surrounding phlegmon and extensive inflammatory changes seen in the mesentery fat the left flank. .       Assessment & Plan:  Crohn's Enterocolitis with abscess and phlegmon -  Continue IV Zosyn  per ID, will change Zyvox and Doxy to oral when she can take oral (unable to tolerate oral ABX 3/22).  Hopefully she can be sent home on orals.  I have DW DR Dunbar.  Blood cx NGTD. ID / CRS/GI following,   Hold Humira and other immunocompromising meds with active infection. To be restarted as infection clears.   --MRI and CT abd reviewed.     --s/p Cscopy by  Dr. Abebe, tolerated procedure    Abd pain   --dilauiid, zofran prn     Anemia  --s/p transfuse 2 units PRBC's 3/20, pt denies any blood in stool that she knows of.    --continue Serial HH  --peripheral smear positive for hemochromic microcytic anemia   --IV Fe added per GI     Hypokalemia  --replace per protocol, follow Mag as well.     Elevated Sed and CRP  -- due to IBD      DVT Prophylaxis: teds/scds      CODE STATUS: Full Code    Disposition: I expect the patient to be discharged home in tbd, once tolerating oral intake.       Electronically signed by Sandrita Arciniega MD, 03/23/18, 10:40 AM.

## 2018-03-23 NOTE — PROGRESS NOTES
Continued Stay Note  [facility]     Patient Name: Sultana Siegel  MRN: [id number]  Today's Date: 3/23/2018    Admit Date: 3/19/2018          Discharge Plan     Row Name 03/23/18 1617       Plan    Plan Home w/ outpt IV abxs at [facility] this weekend.    Patient/Family in Agreement with Plan yes    Plan Comments CM met with pt at bedside.   Pt. is planning to go home today and f/u in a.m. at 9:30 at [facility] office to see the physician and receive her IV antibiotics.    Pt states she has transportation to her appt.  and confirms her surgery for Monday.    CM called [facility] office, #[phone], and confirmed pt does have an appt. at 9:30 Sat morning.    Pt denies further needs.      Final Discharge Disposition Code 70 - other health care institution not elsewhere defined              Discharge Codes    No documentation.       Expected Discharge Date and Time     Expected Discharge Date Expected Discharge Time    Mar 23, 2018             Catalina Betts RN

## 2018-03-23 NOTE — PROGRESS NOTES
"Colon and Rectal [CSGA]    HD 4    /75 (BP Location: Right arm, Patient Position: Lying)   Pulse 72   Temp 98.1 °F (36.7 °C) (Oral)   Resp 18   Ht 172.7 cm (68\")   Wt 73.9 kg (163 lb)   LMP 03/01/2018 (Exact Date)   SpO2 97%   BMI 24.78 kg/m²     Lab Results (last 24 hours)     Procedure Component Value Units Date/Time    Blood Culture - Blood, [[id number]]  (Normal) Collected:  03/19/18 1133    Specimen:  Blood from Arm, Right Updated:  03/23/18 1216     Blood Culture No growth at 4 days    Blood Culture - Blood, [[id number]]  (Normal) Collected:  03/19/18 1134    Specimen:  Blood from Arm, Left Updated:  03/23/18 1216     Blood Culture No growth at 4 days    Zinc [[id number]] Collected:  03/19/18 2211    Specimen:  Blood Updated:  03/23/18 0616     Zinc 57 ug/dL      Comment:                                 Detection Limit = 5       Narrative:       Performed at:  [address] [place] [address]  [id number]  : Juan El MD, Phone:  [phone]    CBC (No Diff) [[id number]]  (Abnormal) Collected:  03/23/18 0501    Specimen:  Blood Updated:  03/23/18 0533     WBC 7.97 10*3/mm3      RBC 3.87 (L) 10*6/mm3      Hemoglobin 8.6 (L) g/dL      Hematocrit 29.5 (L) %      MCV 76.2 (L) fL      MCH 22.2 (L) pg      MCHC 29.2 (L) g/dL      RDW 24.3 (H) %      RDW-SD 64.7 (H) fl      MPV 8.3 fL      Platelets 847 (H) 10*3/mm3     Vitamin B12 [[id number]]  (Abnormal) Collected:  03/20/18 0517    Specimen:  Blood Updated:  03/22/18 2251     Vitamin B-12 >2,000 (H) pg/mL     Folate [[id number]]  (Normal) Collected:  03/20/18 0517    Specimen:  Blood Updated:  03/22/18 2251     Folate 4.28 ng/mL      Comment: Results may be falsely increased if patient taking Biotin.       Narrative:         Folate Reference Ranges:    Deficient:            Less than 1.2 ng/mL  Indeterminant:        1.2-3.1 ng/mL  Normal:               3.2-20.0 ng/mL    Ferritin [[id number]]  (Normal) Collected:  " 03/20/18 0517    Specimen:  Blood Updated:  03/22/18 2251     Ferritin 229.00 ng/mL     Basic Metabolic Panel [[id number]]  (Abnormal) Collected:  03/22/18 1627    Specimen:  Blood Updated:  03/22/18 1801     Glucose 130 (H) mg/dL      BUN <5 (L) mg/dL      Creatinine 0.60 mg/dL      Sodium 139 mmol/L      Potassium 4.2 mmol/L      Chloride 106 mmol/L      CO2 22.0 mmol/L      Calcium 8.0 (L) mg/dL      eGFR Non African Amer 112 mL/min/1.73      BUN/Creatinine Ratio --     Comment: Unable to calculate Bun/Crea Ratio.        Anion Gap 11.0 mmol/L     Narrative:       [employer] Guidelines    Stage     Description        GFR  1         Normal or High     90+  2         Mild decrease      60-89  3         Moderate decrease  30-59  4         Severe decrease    15-29  5         Kidney failure     <15    Legionella Antigen, Urine - Urine, Urine, Clean Catch [[id number]] Collected:  03/20/18 1645    Specimen:  Urine from Urine, Clean Catch Updated:  03/22/18 1516     L. pneumophila Serogp 1 Ur Ag Negative     Comment: Presumptive negative for L. pneumophila serogroup 1 antigen in urine,  suggesting no recent or current infection. Legionnaires' disease  cannot be ruled out since other serogroups and species may also cause  disease.       Narrative:       Performed at:  [address] [place] [address]  [id number]  : Juan El MD, Phone:  [phone]    S. Pneumo Ag Urine or CSF - Urine, Urine, Clean Catch [[id number]] Collected:  03/20/18 1645    Specimen:  Urine from Urine, Clean Catch Updated:  03/22/18 1516     Specimen Source Urine     STREP PNEUMONIAE ANTIGEN Negative     Body Fluid Culture, Sterile Not Indicated     Organism ID Not indicated.     Please note Comment     Comment: College of [employer] Pathologists standards require a culture to be  performed on CSF specimens submitted for bacterial antigen testing.  (CAP [id number]) Urine specimens will not be  cultured.       Narrative:       Performed at:  [address] [facility], [place], [place]  [id number]  : Juan El MD, Phone:  [phone]          No intake/output data recorded.    Alert and oriented.  No nausea or vomiting.  Good pain control  Good UO. Labs stable  Colon x-ray shows a total of 30 cm of stricture starting in the proximal left colon consistent with the stricture I saw at about 55 cm.  The right colon was probably normal but impossible to tell.  The worst stricture is up to the splenic flexure.  As long as she does not eat solids, her symptoms are minimal.  I discussed this with GI and ID and the plan is to do a subtotal colectomy attaching the ileum to the normal left colon.  Conference with the patient and her parents was agreeable to this.  The patient would love to go home today and I don't see any reason why not.  There is no evidence of fistula or abscess on the colon x-ray today.  ID plans on sending her home on Invanz daily since she will not be able tolerate pills.  I went over careful diet with the patient using Slim fast for and sure on a frequent basis.  Dr. Alfaro indicated a recent study showing this was the equivalent of steroids and avoiding those preoperatively will aid in her recovery.  Tentative schedule is 4 2:00 Monday afternoon.    Order Name Source Comment Collection Info Order Time   TISSUE PATHOLOGY EXAM Large Intestine, Transverse Colon   Inflammatory tissue   Collected By: Mahin Abebe MD 3/21/2018  3:36 PM   .    Mahin Abebe MD  03/23/18  2:23 PM

## 2018-03-24 LAB
BACTERIA SPEC AEROBE CULT: NORMAL
BACTERIA SPEC AEROBE CULT: NORMAL

## 2018-03-24 NOTE — DISCHARGE SUMMARY
[facility] Medicine Services  DISCHARGE SUMMARY    Patient Name: Sultana Siegel  : 1980  MRN: [id number]    Date of Admission: 3/19/2018  Date of Discharge:  3/23/2018  Primary Care Physician: Lori Mcmahon MD    Consults     Date and Time Order Name Status Description    3/19/2018 1456 Inpatient Infectious Diseases Consult Completed     3/19/2018 1438 Inpatient Colorectal Surgery Consult Completed         Hospital Course     Presenting Problem:   Exacerbation of Crohn's disease with abscess [K50.914]    Active Hospital Problems (** Indicates Principal Problem)    Diagnosis Date Noted   • Abdominal abscess [K65.1] 2018   • Exacerbation of Crohn's disease with abscess [K50.914] 2018   • Exacerbation of Crohn's disease of large intestine [K50.10] 2016      Resolved Hospital Problems    Diagnosis Date Noted Date Resolved   No resolved problems to display.          Hospital Course:  Sultana Siegel is a 37 y.o. female with PMH of Crohn's disease who presented with abdominal pain and fever.  Pt had recently been on steroids but after tapering the dose, her symptoms recurred. She presented to the [facility], underwent MRI which showed wall thickening in the distal transverse and descending colon with multiloculated abscess. Pt was initiated on IV Zosyn and ID was consulted. Dr. Abebe and GI were also consulted.  Pt underwent a colonoscopy with Dr. 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  There is no associated abscess and no pus draining. Patient had a colon xray on day of discharge which showed a 30 cm stricture starting in the proximal left colon.  Dr. Abebe d/w patient and with ID and GI- plan was for subtotal colectomy on 3/26/18.  Pt went home with PO Doxycycline BID for seven days and will  get IV Invanz daily with Dr. Dunbar.      Procedure(s):  COLONOSCOPY       Day of Discharge     HPI:   Doing well on am of discharge but unable to tolerate much PO intake.   Review of Systems  Gen- No fevers, chills  CV- No chest pain, palpitations  Resp- No cough, dyspnea  GI- No N/V/D, abd pain    Otherwise ROS is negative except as mentioned in the HPI.    Vital Signs:       T= 98.1F  Hr=72  RR=18  Bp110/75    Physical Exam:  Constitutional: ill appearing, awake, alert  Eyes: PERRLA, sclerae anicteric, no conjunctival injection  HENT: NCAT, mucous membranes dry   Neck: Supple, no thyromegaly, no lymphadenopathy, trachea midline  Respiratory: Clear to auscultation bilaterally, nonlabored respirations   Cardiovascular: RRR, no murmurs, rubs, or gallops, palpable pedal pulses bilaterally  Gastrointestinal:decreased bowel sounds, soft, Nt, ND  Musculoskeletal: No bilateral ankle edema, no clubbing or cyanosis to extremities  Psychiatric: Appropriate affect, cooperative  Neurologic: Oriented x 3, strength symmetric in all extremities, Cranial Nerves grossly intact to confrontation, speech clear  Skin: No rashes       Pertinent  and/or Most Recent Results       Results from last 7 days  Lab Units 03/23/18  0501 03/22/18  1627 03/22/18  0750 03/21/18  1326 03/21/18  0725 03/21/18  0659 03/20/18  0517 03/19/18  0701   WBC 10*3/mm3 7.97  --  9.65 9.86 7.44  --  8.29 7.67   HEMOGLOBIN g/dL 8.6*  --  8.4* 7.9* 8.0*  --  6.6* 7.8*   HEMATOCRIT % 29.5*  --  28.1* 26.6* 26.7*  --  23.2* 27.0*   PLATELETS 10*3/mm3 847*  --  808* 821* 805*  --  948* 1,075*   SODIUM mmol/L  --  139 136 138  --  141 137 136   POTASSIUM mmol/L  --  4.2 3.6 4.2  --  3.6 3.2* 3.2*   CHLORIDE mmol/L  --  106 103 108  --  107 103 100   CO2 mmol/L  --  22.0 24.0 22.0  --  23.0 20.0 26.0   BUN mg/dL  --  <5* <5* <5*  --  <5* 8* 10   CREATININE mg/dL  --  0.60 0.70 0.70  --  0.70 0.70 0.80   GLUCOSE mg/dL  --  130* 88 81  --  85 86 120*   CALCIUM mg/dL   --  8.0* 8.3* 8.0*  --  8.5* 8.6* 9.2       Results from last 7 days  Lab Units 03/21/18  1326 03/21/18  0659 03/20/18  0517 03/19/18  0701   BILIRUBIN mg/dL 0.3 0.4 0.2* 0.2*   ALK PHOS U/L 132* 148* 167* 175*   ALT (SGPT) U/L 10 14 16 10   AST (SGOT) U/L 7 11 19 13           Invalid input(s): TG, LDLCALC, LDLREALC      Brief Urine Lab Results  (Last result in the past 365 days)      Color   Clarity   Blood   Leuk Est   Nitrite   Protein   CREAT   Urine HCG        03/19/18 0738               Negative     03/19/18 0738 Dark Yellow(A) Cloudy(A) Negative Small (1+)(A) Negative 30 mg/dL (1+)(A)               Microbiology Results Abnormal     Procedure Component Value - Date/Time    Blood Culture - Blood, [[id number]]  (Normal) Collected:  03/19/18 1133    Lab Status:  Final result Specimen:  Blood from Arm, Right Updated:  03/24/18 1216     Blood Culture No growth at 5 days    Blood Culture - Blood, [[id number]]  (Normal) Collected:  03/19/18 1134    Lab Status:  Final result Specimen:  Blood from Arm, Left Updated:  03/24/18 1216     Blood Culture No growth at 5 days    Legionella Antigen, Urine - Urine, Urine, Clean Catch [[id number]] Collected:  03/20/18 1645    Lab Status:  Final result Specimen:  Urine from Urine, Clean Catch Updated:  03/22/18 1516     L. pneumophila Serogp 1 Ur Ag Negative     Comment: Presumptive negative for L. pneumophila serogroup 1 antigen in urine,  suggesting no recent or current infection. Legionnaires' disease  cannot be ruled out since other serogroups and species may also cause  disease.       Narrative:       Performed at:  [address] [place] [address]  [id number]  : Juan El MD, Phone:  [phone]    S. Pneumo Ag Urine or CSF - Urine, Urine, Clean Catch [[id number]] Collected:  03/20/18 1645    Lab Status:  Final result Specimen:  Urine from Urine, Clean Catch Updated:  03/22/18 1516     Specimen Source Urine     STREP PNEUMONIAE ANTIGEN  Negative     Body Fluid Culture, Sterile Not Indicated     Organism ID Not indicated.     Please note Comment     Comment: [employer] standards require a culture to be  performed on CSF specimens submitted for bacterial antigen testing.  (CAP [id number]) Urine specimens will not be cultured.       Narrative:       Performed at:  [address]  [id number]  : Juan El MD, Phone:  [phone]    Ova & Parasite Examination - Stool, Per Rectum [[id number]] Collected:  03/20/18 1644    Lab Status:  Final result Specimen:  Stool from Per Rectum Updated:  03/22/18 1304     Ova + Parasite Exam No ova or parasites seen on concentrated smear     Trichrome Stain No ova or parasites seen on trichrome stain    Stool Culture - Stool, Per Rectum [[id number]] Collected:  03/20/18 1644    Lab Status:  Final result Specimen:  Stool from Per Rectum Updated:  03/22/18 1144     Stool Culture No Salmonella, Shigella, Campylobacter, E. coli O157, or Vibrio isolated    Narrative:         Not cultured for Yersinia.    Urine Culture - Urine, Urine, Clean Catch [[id number]]  (Abnormal)  (Susceptibility) Collected:  03/19/18 0738    Lab Status:  Final result Specimen:  Urine from Urine, Clean Catch Updated:  03/21/18 1216     Urine Culture --      <10,000 CFU/mL Klebsiella pneumoniae (A)      20,000-30,000 CFU/mL Normal Urogenital Shante    Susceptibility      Klebsiella pneumoniae     JAMES     Ampicillin >16 ug/ml Resistant     Ampicillin + Sulbactam <=8/4 ug/ml Susceptible     Aztreonam <=8 ug/ml Susceptible     Cefepime <=8 ug/ml Susceptible     Cefotaxime <=2 ug/ml Susceptible     Ceftriaxone <=8 ug/ml Susceptible     Cefuroxime sodium <=4 ug/ml Susceptible     Cephalothin <=8 ug/ml Susceptible     Ertapenem <=1 ug/ml Susceptible     Gentamicin <=4 ug/ml Susceptible     Levofloxacin <=2 ug/ml Susceptible     Meropenem <=1 ug/ml Susceptible     Nitrofurantoin 64  ug/ml Intermediate     Piperacillin + Tazobactam <=16 ug/ml Susceptible     Tetracycline <=4 ug/ml Susceptible     Tobramycin <=4 ug/ml Susceptible     Trimethoprim + Sulfamethoxazole <=2/38 ug/ml Susceptible                    Clostridium Difficile Toxin - Stool, Per Rectum [[id number]] Collected:  03/20/18 1644    Lab Status:  Final result Specimen:  Stool from Per Rectum Updated:  03/20/18 1839    Narrative:       The following orders were created for panel order Clostridium Difficile Toxin - Stool, Per Rectum.  Procedure                               Abnormality         Status                     ---------                               -----------         ------                     Clostridium Difficile To...[[id number]]  Normal              Final result                 Please view results for these tests on the individual orders.    Clostridium Difficile Toxin, PCR - Stool, Per Rectum [[id number]]  (Normal) Collected:  03/20/18 1644    Lab Status:  Final result Specimen:  Stool from Per Rectum Updated:  03/20/18 1839     C. Difficile Toxins by PCR Not Detected    Narrative:         Performance characteristics of test not established for patients <2 years of age.  Negative for Toxigenic C. Difficile    Influenza A & B, RT PCR - Swab, Nasopharynx [[id number]]  (Normal) Collected:  03/19/18 1732    Lab Status:  Final result Specimen:  Swab from Nasopharynx Updated:  03/19/18 1946     Influenza A PCR Not Detected     Influenza B PCR Not Detected          Imaging Results (all)     Procedure Component Value Units Date/Time    FL Barium Enema Water Soluble [[id number]] Collected:  03/23/18 1556     Updated:  03/23/18 1613    Narrative:       EXAMINATION: FL BARIUM ENEMA WATER SOLUBLE-     INDICATION: To evaluate the length of her Crohn's stricture of the left  and transverse colon.; K50.914-Crohn's disease, unspecified, with  abscess; G43.A1-Cyclical vomiting, intractable; D63.8-Anemia in other  chronic diseases classified  elsewhere; E61.1-Iron deficiency;  K50.90-Crohn's disease, unspecified, without complications     TECHNIQUE: 2 minutes and 20 seconds of fluoroscopic time was used for  this exam. 14 associated images were saved.  imaging reveals a  nonobstructive bowel gas pattern. The enema tip was carefully placed in  the rectum, and the balloon was inflated. A single contrast study using  water-soluble contrast was performed. The colon was filled in a  retrograde fashion.     COMPARISON: NONE     FINDINGS: Contrast was seen reaching the ascending colon. Due to air  lock phenomenon, the cecum was not opacified with contrast. The rectum,  and sigmoid colon appeared grossly normal. There is mucosal irregularity  with mild luminal narrowing of the proximal to mid descending colon, and  mid to distal transverse colon. The segment of mucosal irregularity of  the descending colon measures approximately 17 cm, and approximately 22  cm of the transverse colon measuring approximately 39 cm in total  length. These results were discussed and reviewed with Dr. Mahin Abebe. Normal evacuation of contrast was noted.          Impression:       Long segment of mucosal irregularity, with luminal narrowing  of the mid to distal transverse colon, and proximal to mid descending  colon, which likely represents active Crohn's disease. These results  were discussed with Dr. Mahin Abebe       This report was finalized on 3/23/2018 4:11 PM by Dr. Adebayo Sawyer.       CT Abdomen Pelvis With & Without Contrast [[id number]] Collected:  03/19/18 1602     Updated:  03/20/18 0048    Narrative:       EXAM:    CT Abdomen and Pelvis Without and With Intravenous Contrast    EXAM DATE/TIME:    3/19/2018 4:02 PM    CLINICAL HISTORY:    37 years old, female; Crohn's disease, unspecified, with abscess; Pain;   Abdominal pain; Flank; Left; Additional info: Abn findings on diagnostic   imaging of body structures    TECHNIQUE:    Axial computed tomography images  of the abdomen and pelvis without and with   intravenous contrast.  Delayed images through the abdomen and pelvis were   performed.    Oral contrast was administered.    Coronal reformatted images were created and reviewed.    All CT scans at this facility use one or more dose reduction techniques,   viz.: automated exposure control; ma/kV adjustment per patient size (including   targeted exams where dose is matched to indication; i.e. head); or iterative   reconstruction technique.    CONTRAST:    99 mL of isovue 300 administered intravenously.    COMPARISON:    CT ABDOMEN PELVIS W CONTRAST 2018-01-09 14:26    FINDINGS:    Lower thorax:  Mild dependent atelectasis posterior lungs, increased.    Possible trace pleural effusions, slightly greater on the LEFT, new.     ABDOMEN:    Liver:  Unremarkable as visualized.  No mass.    Gallbladder and bile ducts:  Gallbladder unremarkable.  No calcified stones.    No ductal dilation.    Pancreas:  Pancreas unremarkable as visualized.  No ductal dilation.    Spleen:  Unremarkable as visualized.  No splenomegaly.    Adrenals:  Adrenals unremarkable.    Kidneys and ureters:  No hydronephrosis or hydroureter.  No definite renal   cortical solid lesion.  Mild LEFT perinephric fluid greatest inferiorly, likely   reactive secondary to the adjacent mesenteric inflammation/phlegmon.    Stomach and bowel:  No evidence of intestinal obstruction.  Oral contrast   demonstrated throughout the small bowel and throughout the colon.  No evidence   of small bowel wall thickening.    Diffuse wall thickening distal transverse colon and sigmoid colon with the   extent of the wall thickening decreased slightly since previous study with   slightly less of the transverse colon and descending colon affected.    However, there is increasing phlegmon including several small foci of   extraluminal air medial to the descending colon, overall measuring   approximately at least 6 cm in AP extent, 4 cm in  greatest transverse extent,   and extending for at least 9.5 cm in craniocaudal direction.  There are several   areas which suggest early fluid collection although there is no walled off   fluid collection with enhancing rim on post contrast or delayed images.  The   largest possible fluid collection measures approximately 3 cm in greatest   diameter and is at the inferior aspect of the inflammation/phlegmon.    Oral contrast is demonstrated throughout the abnormal colon with no   extravasation of oral contrast demonstrated.  There was an area of likely   extraluminal abscess formation medial to the mid descending colon on the prior   study but the overall extent has increased significantly.    The remainder of the colon appears normal with no other area of wall   thickening.    Appendix:  A normal appendix is identified.  There is no evidence of   distention or periappendiceal inflammation to suggest appendicitis.     PELVIS:    Bladder:  Urinary bladder unremarkable with no calcification or wall   thickening.      Reproductive:  Likely physiologic ovarian cysts/follicles.     ABDOMEN and PELVIS:    Intraperitoneal space:  See above.    Bones/joints:  No acute bone abnormality.    Soft tissues:  Small fat-containing umbilical hernia.    Vasculature:  Unremarkable as visualized.  No abdominal aortic aneurysm.    Lymph nodes:  No significant lymphadenopathy.      Impression:         Diffusely increasing inflammation/phlegmon medial to the descending colon with   extraluminal air and several small areas of likely early fluid/abscess   formation although none are walled off and definitely localized.    Diffuse nonspecific colitis distal transverse and descending colon with the   overall extent of the inflammation decreasing slightly since 1/9/2018.    No evidence of extravasation of oral contrast diagnostic for fistula.    Mild amount of LEFT perinephric fluid likely reactive as discussed above.  No   abnormality in  the LEFT kidney demonstrated.    Possible trace bilateral pleural effusions and increasing adjacent atelectasis   and/or infiltrates, all new since 01/09/2018.    Additional nonacute findings as noted, radiographically stable.    THIS DOCUMENT HAS BEEN ELECTRONICALLY SIGNED BY VICTORINO LAUREN MD    MRI Abdomen Pelvis Enterography [[id number]] Collected:  03/19/18 1134     Updated:  03/19/18 9242    Narrative:       EXAMINATION: MRI ABDOMEN AND PELVIS W/WO CONTRAST ENTEROGRAPHY-      INDICATION: Crohn disease suspected, severe abdominal pain  (fever/vomiting/leukocytosis).     TECHNIQUE: Routine MR imaging was obtained of the abdomen and pelvis pre  and post administration of oral and intravenous contrast according to  the enterography protocol.        COMPARISON: 01/09/2018.     FINDINGS: There is no significant change seen in the appearance of the  distal transverse and descending colon when compared to the prior study  of 01/09/2018. There is abnormal wall thickening identified of the colon  with pericolonic stranding. There is edema identified throughout the  mesentery surrounding the descending colon. There are several areas  concerning for extraluminal air and microperforation in the inflammatory  changes within the left flank. There is surrounding phlegmon and soft  tissue. Findings are concerning for multiloculated abscess with some  small pockets of fluid identified. Consider CT scan for further  evaluation of the changes within the left flank if clinically indicated.  The remainder of the bowel reveals no other gross abnormality. The liver  is homogeneous in appearance. No stones seen in the gallbladder. There  is distention of the stomach. Pancreas is homogeneous. Spleen is  unremarkable. The kidneys and adrenal glands within normal limits. No  abdominal or retroperitoneal lymphadenopathy.     PELVIS: The pelvic organs are unremarkable. The pelvic portions of the  gastrointestinal tract are within normal  limits. No pelvic adenopathy.  No free fluid or free air. Bony structures are unremarkable. No abnormal  mass or fluid collection is identified.           Impression:       There is abnormal wall thickening again seen of the distal  transverse colon and descending colon with findings concerning for  multiloculated abscess with extraluminal air seen surrounding the  phlegmon and extensive inflammatory changes seen within the mesenteric  fat in the left flank. Consider CT scan for further evaluation     D:  03/19/2018  E:  03/19/2018        This report was finalized on 3/19/2018 4:16 PM by Dr. Erika Hoover MD.                              Discharge Details      Sultana Siegel   Home Medication Instructions AMOR:[id number]    Printed on:03/24/18 6564   Medication Information                      azaTHIOprine (IMURAN) 50 MG tablet  Take 2 tablets by mouth Daily.             Cyanocobalamin (B-12) 1000 MCG/ML kit  Inject 1,000 mcg as directed 1 (One) Time Per Week. On Tuesdays             doxycycline (MONODOX) 100 MG capsule  Take 1 capsule by mouth Every 12 (Twelve) Hours for 7 days.             ferrous sulfate 325 (65 FE) MG tablet  Take 325 mg by mouth Daily With Breakfast.             levocetirizine (XYZAL) 5 MG tablet  Take 5 mg by mouth As Needed.             mesalamine (LIALDA) 1.2 g EC tablet  Take 2 tablets by mouth Daily With Breakfast.                   Discharge Disposition:  Home or Self Care    Discharge Diet:      Discharge Activity:       Special Instructions:      No future appointments.    Additional Instructions for the Follow-ups that You Need to Schedule     Discharge Follow-up with PCP    As directed      Follow Up Details:  oneweek with PCP               Time Spent on Discharge: 35 minutes    Electronically signed by Sandrita Arciniega MD, 03/24/18, 1:38 PM.

## 2018-03-26 ENCOUNTER — HOSPITAL ENCOUNTER (INPATIENT)
Facility: HOSPITAL | Age: 38
LOS: 5 days | Discharge: HOME | End: 2018-03-31
Attending: COLON & RECTAL SURGERY | Admitting: COLON & RECTAL SURGERY

## 2018-03-26 ENCOUNTER — ANESTHESIA EVENT (OUTPATIENT)
Dept: PERIOP | Facility: HOSPITAL | Age: 38
End: 2018-03-26

## 2018-03-26 ENCOUNTER — APPOINTMENT (OUTPATIENT)
Dept: GENERAL RADIOLOGY | Facility: HOSPITAL | Age: 38
End: 2018-03-26

## 2018-03-26 ENCOUNTER — ANESTHESIA (OUTPATIENT)
Dept: PERIOP | Facility: HOSPITAL | Age: 38
End: 2018-03-26

## 2018-03-26 DIAGNOSIS — K63.1 COLON PERFORATION (HCC): ICD-10-CM

## 2018-03-26 LAB
ABO GROUP BLD: NORMAL
ANION GAP SERPL CALCULATED.3IONS-SCNC: 11 MMOL/L (ref 3–11)
B-HCG UR QL: NEGATIVE
BLD GP AB SCN SERPL QL: NEGATIVE
BUN BLD-MCNC: 6 MG/DL (ref 9–23)
BUN/CREAT SERPL: 7.5 (ref 7–25)
CALCIUM SPEC-SCNC: 9.6 MG/DL (ref 8.7–10.4)
CHLORIDE SERPL-SCNC: 105 MMOL/L (ref 99–109)
CO2 SERPL-SCNC: 26 MMOL/L (ref 20–31)
CREAT BLD-MCNC: 0.8 MG/DL (ref 0.6–1.3)
DEPRECATED RDW RBC AUTO: 73.3 FL (ref 37–54)
ERYTHROCYTE [DISTWIDTH] IN BLOOD BY AUTOMATED COUNT: 26.4 % (ref 11.3–14.5)
GFR SERPL CREATININE-BSD FRML MDRD: 80 ML/MIN/1.73
GLUCOSE BLD-MCNC: 87 MG/DL (ref 70–100)
HCT VFR BLD AUTO: 28.1 % (ref 34.5–44)
HCT VFR BLD AUTO: 33.4 % (ref 34.5–44)
HGB BLD-MCNC: 8.2 G/DL (ref 11.5–15.5)
HGB BLD-MCNC: 9.7 G/DL (ref 11.5–15.5)
INTERNAL NEGATIVE CONTROL: NORMAL
INTERNAL POSITIVE CONTROL: REACTIVE
Lab: NORMAL
MCH RBC QN AUTO: 22.8 PG (ref 27–31)
MCHC RBC AUTO-ENTMCNC: 29 G/DL (ref 32–36)
MCV RBC AUTO: 78.6 FL (ref 80–99)
PLATELET # BLD AUTO: 912 10*3/MM3 (ref 150–450)
PMV BLD AUTO: 8.4 FL (ref 6–12)
POTASSIUM BLD-SCNC: 3.9 MMOL/L (ref 3.5–5.5)
RBC # BLD AUTO: 4.25 10*6/MM3 (ref 3.89–5.14)
RH BLD: NEGATIVE
SODIUM BLD-SCNC: 142 MMOL/L (ref 132–146)
WBC NRBC COR # BLD: 8.18 10*3/MM3 (ref 3.5–10.8)

## 2018-03-26 PROCEDURE — 25010000002 FENTANYL CITRATE (PF) 100 MCG/2ML SOLUTION: Performed by: ANESTHESIOLOGY

## 2018-03-26 PROCEDURE — 87206 SMEAR FLUORESCENT/ACID STAI: CPT | Performed by: COLON & RECTAL SURGERY

## 2018-03-26 PROCEDURE — 86923 COMPATIBILITY TEST ELECTRIC: CPT

## 2018-03-26 PROCEDURE — 25010000002 ONDANSETRON PER 1 MG: Performed by: COLON & RECTAL SURGERY

## 2018-03-26 PROCEDURE — 88307 TISSUE EXAM BY PATHOLOGIST: CPT | Performed by: COLON & RECTAL SURGERY

## 2018-03-26 PROCEDURE — 86901 BLOOD TYPING SEROLOGIC RH(D): CPT | Performed by: ANESTHESIOLOGY

## 2018-03-26 PROCEDURE — 87205 SMEAR GRAM STAIN: CPT | Performed by: COLON & RECTAL SURGERY

## 2018-03-26 PROCEDURE — 0DBU0ZZ EXCISION OF OMENTUM, OPEN APPROACH: ICD-10-PCS | Performed by: COLON & RECTAL SURGERY

## 2018-03-26 PROCEDURE — 0DTF0ZZ RESECTION OF RIGHT LARGE INTESTINE, OPEN APPROACH: ICD-10-PCS | Performed by: COLON & RECTAL SURGERY

## 2018-03-26 PROCEDURE — 25010000002 NEOSTIGMINE 10 MG/10ML SOLUTION: Performed by: ANESTHESIOLOGY

## 2018-03-26 PROCEDURE — 86850 RBC ANTIBODY SCREEN: CPT | Performed by: ANESTHESIOLOGY

## 2018-03-26 PROCEDURE — 25010000002 PROPOFOL 1000 MG/ML EMULSION: Performed by: NURSE ANESTHETIST, CERTIFIED REGISTERED

## 2018-03-26 PROCEDURE — 87070 CULTURE OTHR SPECIMN AEROBIC: CPT | Performed by: COLON & RECTAL SURGERY

## 2018-03-26 PROCEDURE — 85018 HEMOGLOBIN: CPT | Performed by: ANESTHESIOLOGY

## 2018-03-26 PROCEDURE — 25010000002 HEPARIN (PORCINE) 5000 UNIT/0.5ML SOLUTION: Performed by: COLON & RECTAL SURGERY

## 2018-03-26 PROCEDURE — 25010000002 MORPHINE SULFATE (PF) 2 MG/ML SOLUTION: Performed by: COLON & RECTAL SURGERY

## 2018-03-26 PROCEDURE — 25010000002 PROPOFOL 10 MG/ML EMULSION: Performed by: NURSE ANESTHETIST, CERTIFIED REGISTERED

## 2018-03-26 PROCEDURE — P9041 ALBUMIN (HUMAN),5%, 50ML: HCPCS | Performed by: ANESTHESIOLOGY

## 2018-03-26 PROCEDURE — 87176 TISSUE HOMOGENIZATION CULTR: CPT | Performed by: COLON & RECTAL SURGERY

## 2018-03-26 PROCEDURE — 25010000002 BUPRENORPHINE PER 0.1 MG: Performed by: NURSE ANESTHETIST, CERTIFIED REGISTERED

## 2018-03-26 PROCEDURE — 25010000002 DEXAMETHASONE SODIUM PHOSPHATE 10 MG/ML SOLUTION 1 ML VIAL: Performed by: NURSE ANESTHETIST, CERTIFIED REGISTERED

## 2018-03-26 PROCEDURE — 87075 CULTR BACTERIA EXCEPT BLOOD: CPT | Performed by: COLON & RECTAL SURGERY

## 2018-03-26 PROCEDURE — 85014 HEMATOCRIT: CPT | Performed by: ANESTHESIOLOGY

## 2018-03-26 PROCEDURE — 87116 MYCOBACTERIA CULTURE: CPT | Performed by: COLON & RECTAL SURGERY

## 2018-03-26 PROCEDURE — 25010000002 DEXAMETHASONE PER 1 MG: Performed by: NURSE ANESTHETIST, CERTIFIED REGISTERED

## 2018-03-26 PROCEDURE — 80048 BASIC METABOLIC PNL TOTAL CA: CPT | Performed by: COLON & RECTAL SURGERY

## 2018-03-26 PROCEDURE — 25010000002 ERTAPENEM: Performed by: COLON & RECTAL SURGERY

## 2018-03-26 PROCEDURE — 25010000002 ALBUMIN HUMAN 5% PER 50 ML: Performed by: ANESTHESIOLOGY

## 2018-03-26 PROCEDURE — 86900 BLOOD TYPING SEROLOGIC ABO: CPT | Performed by: ANESTHESIOLOGY

## 2018-03-26 PROCEDURE — 25010000002 PROMETHAZINE PER 50 MG: Performed by: ANESTHESIOLOGY

## 2018-03-26 PROCEDURE — 25010000002 ONDANSETRON PER 1 MG: Performed by: ANESTHESIOLOGY

## 2018-03-26 PROCEDURE — 87102 FUNGUS ISOLATION CULTURE: CPT | Performed by: COLON & RECTAL SURGERY

## 2018-03-26 PROCEDURE — 25010000002 FENTANYL CITRATE (PF) 100 MCG/2ML SOLUTION: Performed by: NURSE ANESTHETIST, CERTIFIED REGISTERED

## 2018-03-26 PROCEDURE — 85027 COMPLETE CBC AUTOMATED: CPT | Performed by: COLON & RECTAL SURGERY

## 2018-03-26 RX ORDER — ATRACURIUM BESYLATE 10 MG/ML
INJECTION, SOLUTION INTRAVENOUS AS NEEDED
Status: DISCONTINUED | OUTPATIENT
Start: 2018-03-26 | End: 2018-03-26 | Stop reason: SURG

## 2018-03-26 RX ORDER — DIAZEPAM 5 MG/1
5 TABLET ORAL EVERY 6 HOURS PRN
Status: DISCONTINUED | OUTPATIENT
Start: 2018-03-26 | End: 2018-03-31 | Stop reason: HOSPADM

## 2018-03-26 RX ORDER — MAGNESIUM HYDROXIDE 1200 MG/15ML
LIQUID ORAL AS NEEDED
Status: DISCONTINUED | OUTPATIENT
Start: 2018-03-26 | End: 2018-03-26 | Stop reason: HOSPADM

## 2018-03-26 RX ORDER — ACETAMINOPHEN 500 MG
1000 TABLET ORAL ONCE
Status: COMPLETED | OUTPATIENT
Start: 2018-03-26 | End: 2018-03-26

## 2018-03-26 RX ORDER — ALBUMIN, HUMAN INJ 5% 5 %
SOLUTION INTRAVENOUS CONTINUOUS PRN
Status: DISCONTINUED | OUTPATIENT
Start: 2018-03-26 | End: 2018-03-26 | Stop reason: SURG

## 2018-03-26 RX ORDER — PROMETHAZINE HYDROCHLORIDE 25 MG/1
25 TABLET ORAL ONCE AS NEEDED
Status: COMPLETED | OUTPATIENT
Start: 2018-03-26 | End: 2018-03-26

## 2018-03-26 RX ORDER — PREGABALIN 75 MG/1
75 CAPSULE ORAL ONCE
Status: COMPLETED | OUTPATIENT
Start: 2018-03-26 | End: 2018-03-26

## 2018-03-26 RX ORDER — ONDANSETRON 2 MG/ML
4 INJECTION INTRAMUSCULAR; INTRAVENOUS EVERY 6 HOURS PRN
Status: DISCONTINUED | OUTPATIENT
Start: 2018-03-26 | End: 2018-03-31 | Stop reason: HOSPADM

## 2018-03-26 RX ORDER — FENTANYL CITRATE 50 UG/ML
50 INJECTION, SOLUTION INTRAMUSCULAR; INTRAVENOUS
Status: DISCONTINUED | OUTPATIENT
Start: 2018-03-26 | End: 2018-03-26 | Stop reason: HOSPADM

## 2018-03-26 RX ORDER — HEPARIN SODIUM 5000 [USP'U]/.5ML
5000 INJECTION, SOLUTION INTRAVENOUS; SUBCUTANEOUS EVERY 8 HOURS SCHEDULED
Status: DISCONTINUED | OUTPATIENT
Start: 2018-03-26 | End: 2018-03-26

## 2018-03-26 RX ORDER — SODIUM CHLORIDE, SODIUM LACTATE, POTASSIUM CHLORIDE, CALCIUM CHLORIDE 600; 310; 30; 20 MG/100ML; MG/100ML; MG/100ML; MG/100ML
9 INJECTION, SOLUTION INTRAVENOUS CONTINUOUS
Status: DISCONTINUED | OUTPATIENT
Start: 2018-03-26 | End: 2018-03-27

## 2018-03-26 RX ORDER — NALOXONE HCL 0.4 MG/ML
0.4 VIAL (ML) INJECTION AS NEEDED
Status: DISCONTINUED | OUTPATIENT
Start: 2018-03-26 | End: 2018-03-26 | Stop reason: HOSPADM

## 2018-03-26 RX ORDER — HYDROCODONE BITARTRATE AND ACETAMINOPHEN 7.5; 325 MG/1; MG/1
1 TABLET ORAL EVERY 4 HOURS PRN
Status: DISCONTINUED | OUTPATIENT
Start: 2018-03-26 | End: 2018-03-31 | Stop reason: HOSPADM

## 2018-03-26 RX ORDER — HYDROMORPHONE HYDROCHLORIDE 1 MG/ML
0.5 INJECTION, SOLUTION INTRAMUSCULAR; INTRAVENOUS; SUBCUTANEOUS
Status: DISCONTINUED | OUTPATIENT
Start: 2018-03-26 | End: 2018-03-26 | Stop reason: HOSPADM

## 2018-03-26 RX ORDER — PROMETHAZINE HYDROCHLORIDE 25 MG/1
25 SUPPOSITORY RECTAL ONCE AS NEEDED
Status: COMPLETED | OUTPATIENT
Start: 2018-03-26 | End: 2018-03-26

## 2018-03-26 RX ORDER — LIDOCAINE HYDROCHLORIDE 10 MG/ML
INJECTION, SOLUTION EPIDURAL; INFILTRATION; INTRACAUDAL; PERINEURAL AS NEEDED
Status: DISCONTINUED | OUTPATIENT
Start: 2018-03-26 | End: 2018-03-26 | Stop reason: SURG

## 2018-03-26 RX ORDER — ONDANSETRON 2 MG/ML
4 INJECTION INTRAMUSCULAR; INTRAVENOUS ONCE AS NEEDED
Status: DISCONTINUED | OUTPATIENT
Start: 2018-03-26 | End: 2018-03-26 | Stop reason: HOSPADM

## 2018-03-26 RX ORDER — HEPARIN SODIUM 5000 [USP'U]/.5ML
5000 INJECTION, SOLUTION INTRAVENOUS; SUBCUTANEOUS ONCE
Status: COMPLETED | OUTPATIENT
Start: 2018-03-26 | End: 2018-03-26

## 2018-03-26 RX ORDER — MEPERIDINE HYDROCHLORIDE 25 MG/ML
12.5 INJECTION INTRAMUSCULAR; INTRAVENOUS; SUBCUTANEOUS
Status: DISCONTINUED | OUTPATIENT
Start: 2018-03-26 | End: 2018-03-26 | Stop reason: HOSPADM

## 2018-03-26 RX ORDER — SODIUM CHLORIDE, SODIUM LACTATE, POTASSIUM CHLORIDE, CALCIUM CHLORIDE 600; 310; 30; 20 MG/100ML; MG/100ML; MG/100ML; MG/100ML
100 INJECTION, SOLUTION INTRAVENOUS CONTINUOUS
Status: DISCONTINUED | OUTPATIENT
Start: 2018-03-26 | End: 2018-03-27

## 2018-03-26 RX ORDER — NEOSTIGMINE METHYLSULFATE 1 MG/ML
INJECTION, SOLUTION INTRAVENOUS AS NEEDED
Status: DISCONTINUED | OUTPATIENT
Start: 2018-03-26 | End: 2018-03-26 | Stop reason: SURG

## 2018-03-26 RX ORDER — PROPOFOL 10 MG/ML
VIAL (ML) INTRAVENOUS AS NEEDED
Status: DISCONTINUED | OUTPATIENT
Start: 2018-03-26 | End: 2018-03-26 | Stop reason: SURG

## 2018-03-26 RX ORDER — SODIUM CHLORIDE 0.9 % (FLUSH) 0.9 %
1-10 SYRINGE (ML) INJECTION AS NEEDED
Status: DISCONTINUED | OUTPATIENT
Start: 2018-03-26 | End: 2018-03-26 | Stop reason: HOSPADM

## 2018-03-26 RX ORDER — NALOXONE HCL 0.4 MG/ML
0.4 VIAL (ML) INJECTION
Status: DISCONTINUED | OUTPATIENT
Start: 2018-03-26 | End: 2018-03-31 | Stop reason: HOSPADM

## 2018-03-26 RX ORDER — FENTANYL CITRATE 50 UG/ML
INJECTION, SOLUTION INTRAMUSCULAR; INTRAVENOUS AS NEEDED
Status: DISCONTINUED | OUTPATIENT
Start: 2018-03-26 | End: 2018-03-26 | Stop reason: SURG

## 2018-03-26 RX ORDER — PROMETHAZINE HYDROCHLORIDE 25 MG/ML
6.25 INJECTION, SOLUTION INTRAMUSCULAR; INTRAVENOUS ONCE AS NEEDED
Status: COMPLETED | OUTPATIENT
Start: 2018-03-26 | End: 2018-03-26

## 2018-03-26 RX ORDER — GLYCOPYRROLATE 0.2 MG/ML
INJECTION INTRAMUSCULAR; INTRAVENOUS AS NEEDED
Status: DISCONTINUED | OUTPATIENT
Start: 2018-03-26 | End: 2018-03-26 | Stop reason: SURG

## 2018-03-26 RX ORDER — LABETALOL HYDROCHLORIDE 5 MG/ML
5 INJECTION, SOLUTION INTRAVENOUS
Status: DISCONTINUED | OUTPATIENT
Start: 2018-03-26 | End: 2018-03-26 | Stop reason: HOSPADM

## 2018-03-26 RX ORDER — SODIUM CHLORIDE 9 MG/ML
100 INJECTION, SOLUTION INTRAVENOUS CONTINUOUS
Status: DISCONTINUED | OUTPATIENT
Start: 2018-03-26 | End: 2018-03-30

## 2018-03-26 RX ORDER — OXYCODONE HYDROCHLORIDE AND ACETAMINOPHEN 5; 325 MG/1; MG/1
1 TABLET ORAL ONCE AS NEEDED
Status: DISCONTINUED | OUTPATIENT
Start: 2018-03-26 | End: 2018-03-26 | Stop reason: HOSPADM

## 2018-03-26 RX ORDER — DEXAMETHASONE SODIUM PHOSPHATE 4 MG/ML
INJECTION, SOLUTION INTRA-ARTICULAR; INTRALESIONAL; INTRAMUSCULAR; INTRAVENOUS; SOFT TISSUE AS NEEDED
Status: DISCONTINUED | OUTPATIENT
Start: 2018-03-26 | End: 2018-03-26 | Stop reason: SURG

## 2018-03-26 RX ORDER — MORPHINE SULFATE 2 MG/ML
2 INJECTION, SOLUTION INTRAMUSCULAR; INTRAVENOUS
Status: DISCONTINUED | OUTPATIENT
Start: 2018-03-26 | End: 2018-03-27

## 2018-03-26 RX ORDER — FAMOTIDINE 20 MG/1
20 TABLET, FILM COATED ORAL ONCE
Status: COMPLETED | OUTPATIENT
Start: 2018-03-26 | End: 2018-03-26

## 2018-03-26 RX ORDER — EPHEDRINE SULFATE 50 MG/ML
5 INJECTION, SOLUTION INTRAVENOUS ONCE AS NEEDED
Status: DISCONTINUED | OUTPATIENT
Start: 2018-03-26 | End: 2018-03-26 | Stop reason: HOSPADM

## 2018-03-26 RX ORDER — ONDANSETRON 2 MG/ML
INJECTION INTRAMUSCULAR; INTRAVENOUS AS NEEDED
Status: DISCONTINUED | OUTPATIENT
Start: 2018-03-26 | End: 2018-03-26 | Stop reason: SURG

## 2018-03-26 RX ORDER — LIDOCAINE HYDROCHLORIDE 10 MG/ML
0.5 INJECTION, SOLUTION EPIDURAL; INFILTRATION; INTRACAUDAL; PERINEURAL ONCE AS NEEDED
Status: COMPLETED | OUTPATIENT
Start: 2018-03-26 | End: 2018-03-26

## 2018-03-26 RX ORDER — ACETAMINOPHEN 500 MG
1000 TABLET ORAL 3 TIMES DAILY
Status: DISCONTINUED | OUTPATIENT
Start: 2018-03-26 | End: 2018-03-31 | Stop reason: HOSPADM

## 2018-03-26 RX ADMIN — MORPHINE SULFATE 2 MG: 2 INJECTION, SOLUTION INTRAMUSCULAR; INTRAVENOUS at 21:32

## 2018-03-26 RX ADMIN — FENTANYL CITRATE 50 MCG: 50 INJECTION, SOLUTION INTRAMUSCULAR; INTRAVENOUS at 20:23

## 2018-03-26 RX ADMIN — DEXAMETHASONE SODIUM PHOSPHATE 6 MG: 4 INJECTION, SOLUTION INTRAMUSCULAR; INTRAVENOUS at 16:43

## 2018-03-26 RX ADMIN — ALBUMIN HUMAN: 0.05 INJECTION, SOLUTION INTRAVENOUS at 18:40

## 2018-03-26 RX ADMIN — DEXAMETHASONE SODIUM PHOSPHATE 61.4 ML: 10 INJECTION, SOLUTION INTRAMUSCULAR; INTRAVENOUS at 16:44

## 2018-03-26 RX ADMIN — PROPOFOL 150 MG: 10 INJECTION, EMULSION INTRAVENOUS at 16:43

## 2018-03-26 RX ADMIN — ATRACURIUM BESYLATE 10 MG: 10 INJECTION, SOLUTION INTRAVENOUS at 18:16

## 2018-03-26 RX ADMIN — NEOSTIGMINE METHYLSULFATE 3 MG: 1 INJECTION, SOLUTION INTRAVENOUS at 19:22

## 2018-03-26 RX ADMIN — PREGABALIN 75 MG: 75 CAPSULE ORAL at 13:23

## 2018-03-26 RX ADMIN — HEPARIN SODIUM 5000 UNITS: 10000 INJECTION, SOLUTION INTRAVENOUS; SUBCUTANEOUS at 13:25

## 2018-03-26 RX ADMIN — MORPHINE SULFATE 2 MG: 2 INJECTION, SOLUTION INTRAMUSCULAR; INTRAVENOUS at 23:17

## 2018-03-26 RX ADMIN — SODIUM CHLORIDE, POTASSIUM CHLORIDE, SODIUM LACTATE AND CALCIUM CHLORIDE 9 ML/HR: 600; 310; 30; 20 INJECTION, SOLUTION INTRAVENOUS at 13:13

## 2018-03-26 RX ADMIN — ACETAMINOPHEN 1000 MG: 500 TABLET ORAL at 13:23

## 2018-03-26 RX ADMIN — SODIUM CHLORIDE, POTASSIUM CHLORIDE, SODIUM LACTATE AND CALCIUM CHLORIDE 100 ML/HR: 600; 310; 30; 20 INJECTION, SOLUTION INTRAVENOUS at 21:32

## 2018-03-26 RX ADMIN — FAMOTIDINE 20 MG: 20 TABLET, FILM COATED ORAL at 13:13

## 2018-03-26 RX ADMIN — LIDOCAINE HYDROCHLORIDE 0.5 ML: 10 INJECTION, SOLUTION EPIDURAL; INFILTRATION; INTRACAUDAL; PERINEURAL at 13:13

## 2018-03-26 RX ADMIN — SODIUM CHLORIDE 100 ML/HR: 9 INJECTION, SOLUTION INTRAVENOUS at 22:43

## 2018-03-26 RX ADMIN — ONDANSETRON 4 MG: 2 INJECTION INTRAMUSCULAR; INTRAVENOUS at 19:22

## 2018-03-26 RX ADMIN — ONDANSETRON 4 MG: 2 INJECTION INTRAMUSCULAR; INTRAVENOUS at 22:42

## 2018-03-26 RX ADMIN — GLYCOPYRROLATE 0.4 MG: 0.2 INJECTION INTRAMUSCULAR; INTRAVENOUS at 19:22

## 2018-03-26 RX ADMIN — ERTAPENEM SODIUM 1 G: 1 INJECTION, POWDER, LYOPHILIZED, FOR SOLUTION INTRAMUSCULAR; INTRAVENOUS at 16:40

## 2018-03-26 RX ADMIN — LIDOCAINE HYDROCHLORIDE 30 MG: 10 INJECTION, SOLUTION EPIDURAL; INFILTRATION; INTRACAUDAL; PERINEURAL at 16:43

## 2018-03-26 RX ADMIN — PROPOFOL 25 MCG/KG/MIN: 10 INJECTION, EMULSION INTRAVENOUS at 16:48

## 2018-03-26 RX ADMIN — PROMETHAZINE HYDROCHLORIDE 6.25 MG: 25 INJECTION INTRAMUSCULAR; INTRAVENOUS at 20:19

## 2018-03-26 RX ADMIN — FENTANYL CITRATE 100 MCG: 50 INJECTION, SOLUTION INTRAMUSCULAR; INTRAVENOUS at 16:43

## 2018-03-26 RX ADMIN — ATRACURIUM BESYLATE 40 MG: 10 INJECTION, SOLUTION INTRAVENOUS at 16:43

## 2018-03-26 RX ADMIN — SODIUM CHLORIDE 1000 ML: 9 INJECTION, SOLUTION INTRAVENOUS at 15:10

## 2018-03-26 NOTE — ANESTHESIA PROCEDURE NOTES
Airway  Urgency: elective    Airway not difficult    General Information and Staff    Patient location during procedure: OR  Anesthesiologist: JANIA DELGADO  CRNA: LILA HOLT    Indications and Patient Condition  Indications for airway management: airway protection    Preoxygenated: yes  MILS not maintained throughout  Mask difficulty assessment: 1 - vent by mask    Final Airway Details  Final airway type: endotracheal airway      Successful airway: ETT  Cuffed: yes   Successful intubation technique: direct laryngoscopy  Endotracheal tube insertion site: oral  Blade: Ana María  Blade size: #3  ETT size: 7.0 mm  Cormack-Lehane Classification: grade I - full view of glottis  Placement verified by: chest auscultation and capnometry   Measured from: lips  ETT to lips (cm): 20  Number of attempts at approach: 1    Additional Comments  Negative epigastric sounds, Breath sound equal bilaterally with symmetric chest rise and fall

## 2018-03-26 NOTE — ANESTHESIA PROCEDURE NOTES
Peripheral Block    Patient location during procedure: OR  Reason for block: at surgeon's request and post-op pain management  Performed by  Anesthesiologist: JANIA DELGADO  CRNA: LILA HOLT  Preanesthetic Checklist  Completed: patient identified, site marked, surgical consent, pre-op evaluation, timeout performed, IV checked, risks and benefits discussed and monitors and equipment checked  Prep:  Pt Position: supine  Sterile barriers:cap, gloves, sterile barriers and mask  Prep: ChloraPrep  Patient monitoring: blood pressure monitoring, continuous pulse oximetry and EKG  Procedure  Sedation:yes  Performed under: general  Guidance:ultrasound guided  Images:still images obtained    Laterality:Bilateral  Block Type:TAP  Injection Technique:single-shot  Needle Type:short-bevel and echogenic  Needle Gauge:20 G    Medications  Comment:Block Injection:  LA dose divided between Right and Left block       Adjuncts:  Decadron 4mg PSF, Buprenex 0.3mg (Per total volume of LA)  Local Injected:bupivacaine 0.25% Local Amount Injected:60mL  Post Assessment  Injection Assessment: negative aspiration for heme, incremental injection and no paresthesia on injection  Patient Tolerance:comfortable throughout block  Complications:no  Additional Notes      Under Ultrasound guidance, a BBraun 4inch 360 degree needle was advanced with Normal Saline hydro dissection of tissue.  The Internal Oblique and Transversus Abdominus muscles where visualized.  At or before the aponeurosis of Internal Oblique, local anesthetic spread was visualized in the Transversus Abdominus Plane. Injection was made incrementally with aspiration every 5 mls.  There was no  intravascular injection,  injection pressure was normal, there was no neural injection, and the procedure was completed without difficulty.  Thank You.

## 2018-03-26 NOTE — ANESTHESIA PREPROCEDURE EVALUATION
Anesthesia Evaluation     Patient summary reviewed and Nursing notes reviewed   NPO Solid Status: > 8 hours  NPO Liquid Status: > 8 hours           Airway   Mallampati: I  TM distance: >3 FB  Neck ROM: full  No difficulty expected  Dental - normal exam     Pulmonary    Cardiovascular         Neuro/Psych  GI/Hepatic/Renal/Endo      ROS Comment: Crohn's disease    Musculoskeletal     Abdominal    Substance History      OB/GYN    (-)  Pregnant        Other                        Anesthesia Plan    ASA 1     general   (TAPs)  intravenous induction   Anesthetic plan and risks discussed with patient.    Plan discussed with CRNA.

## 2018-03-27 ENCOUNTER — APPOINTMENT (OUTPATIENT)
Dept: MRI IMAGING | Facility: HOSPITAL | Age: 38
End: 2018-03-27
Attending: INTERNAL MEDICINE

## 2018-03-27 LAB
ANION GAP SERPL CALCULATED.3IONS-SCNC: 9 MMOL/L (ref 3–11)
BASOPHILS # BLD AUTO: 0 10*3/MM3 (ref 0–0.2)
BASOPHILS NFR BLD AUTO: 0 % (ref 0–1)
BUN BLD-MCNC: 7 MG/DL (ref 9–23)
BUN/CREAT SERPL: 11.7 (ref 7–25)
CALCIUM SPEC-SCNC: 8.7 MG/DL (ref 8.7–10.4)
CHLORIDE SERPL-SCNC: 110 MMOL/L (ref 99–109)
CO2 SERPL-SCNC: 21 MMOL/L (ref 20–31)
CREAT BLD-MCNC: 0.6 MG/DL (ref 0.6–1.3)
DEPRECATED RDW RBC AUTO: 74.1 FL (ref 37–54)
EOSINOPHIL # BLD AUTO: 0 10*3/MM3 (ref 0–0.3)
EOSINOPHIL NFR BLD AUTO: 0 % (ref 0–3)
ERYTHROCYTE [DISTWIDTH] IN BLOOD BY AUTOMATED COUNT: 26.4 % (ref 11.3–14.5)
GFR SERPL CREATININE-BSD FRML MDRD: 112 ML/MIN/1.73
GLUCOSE BLD-MCNC: 148 MG/DL (ref 70–100)
HCT VFR BLD AUTO: 27.9 % (ref 34.5–44)
HGB BLD-MCNC: 8 G/DL (ref 11.5–15.5)
IMM GRANULOCYTES # BLD: 0.04 10*3/MM3 (ref 0–0.03)
IMM GRANULOCYTES NFR BLD: 0.4 % (ref 0–0.6)
LYMPHOCYTES # BLD AUTO: 0.51 10*3/MM3 (ref 0.6–4.8)
LYMPHOCYTES NFR BLD AUTO: 5 % (ref 24–44)
MAGNESIUM SERPL-MCNC: 1.9 MG/DL (ref 1.3–2.7)
MCH RBC QN AUTO: 22.7 PG (ref 27–31)
MCHC RBC AUTO-ENTMCNC: 28.7 G/DL (ref 32–36)
MCV RBC AUTO: 79 FL (ref 80–99)
MONOCYTES # BLD AUTO: 0.31 10*3/MM3 (ref 0–1)
MONOCYTES NFR BLD AUTO: 3 % (ref 0–12)
NEUTROPHILS # BLD AUTO: 9.41 10*3/MM3 (ref 1.5–8.3)
NEUTROPHILS NFR BLD AUTO: 91.6 % (ref 41–71)
PLATELET # BLD AUTO: 847 10*3/MM3 (ref 150–450)
PMV BLD AUTO: 8.5 FL (ref 6–12)
POTASSIUM BLD-SCNC: 4.3 MMOL/L (ref 3.5–5.5)
RBC # BLD AUTO: 3.53 10*6/MM3 (ref 3.89–5.14)
RETICS/RBC NFR AUTO: 1.55 % (ref 0.5–1.5)
SODIUM BLD-SCNC: 140 MMOL/L (ref 132–146)
WBC NRBC COR # BLD: 10.27 10*3/MM3 (ref 3.5–10.8)

## 2018-03-27 PROCEDURE — 25010000002 ENOXAPARIN PER 10 MG: Performed by: COLON & RECTAL SURGERY

## 2018-03-27 PROCEDURE — 83735 ASSAY OF MAGNESIUM: CPT | Performed by: COLON & RECTAL SURGERY

## 2018-03-27 PROCEDURE — 80048 BASIC METABOLIC PNL TOTAL CA: CPT | Performed by: COLON & RECTAL SURGERY

## 2018-03-27 PROCEDURE — 25010000002 MORPHINE SULFATE (PF) 2 MG/ML SOLUTION: Performed by: COLON & RECTAL SURGERY

## 2018-03-27 PROCEDURE — 85025 COMPLETE CBC W/AUTO DIFF WBC: CPT | Performed by: COLON & RECTAL SURGERY

## 2018-03-27 PROCEDURE — 25010000002 ERTAPENEM: Performed by: INTERNAL MEDICINE

## 2018-03-27 PROCEDURE — 25010000002 HYDROMORPHONE PER 4 MG: Performed by: COLON & RECTAL SURGERY

## 2018-03-27 PROCEDURE — 85045 AUTOMATED RETICULOCYTE COUNT: CPT | Performed by: INTERNAL MEDICINE

## 2018-03-27 PROCEDURE — 25010000002 DAPTOMYCIN PER 1 MG: Performed by: INTERNAL MEDICINE

## 2018-03-27 PROCEDURE — 99253 IP/OBS CNSLTJ NEW/EST LOW 45: CPT | Performed by: INTERNAL MEDICINE

## 2018-03-27 PROCEDURE — 25010000002 ONDANSETRON PER 1 MG: Performed by: COLON & RECTAL SURGERY

## 2018-03-27 PROCEDURE — 25010000002 KETOROLAC TROMETHAMINE PER 15 MG: Performed by: COLON & RECTAL SURGERY

## 2018-03-27 RX ORDER — HYDROMORPHONE HYDROCHLORIDE 1 MG/ML
1 INJECTION, SOLUTION INTRAMUSCULAR; INTRAVENOUS; SUBCUTANEOUS
Status: DISCONTINUED | OUTPATIENT
Start: 2018-03-27 | End: 2018-03-31 | Stop reason: HOSPADM

## 2018-03-27 RX ORDER — KETOROLAC TROMETHAMINE 30 MG/ML
30 INJECTION, SOLUTION INTRAMUSCULAR; INTRAVENOUS EVERY 8 HOURS
Status: DISCONTINUED | OUTPATIENT
Start: 2018-03-27 | End: 2018-03-31 | Stop reason: HOSPADM

## 2018-03-27 RX ADMIN — MORPHINE SULFATE 2 MG: 2 INJECTION, SOLUTION INTRAMUSCULAR; INTRAVENOUS at 04:00

## 2018-03-27 RX ADMIN — HYDROCODONE BITARTRATE AND ACETAMINOPHEN 1 TABLET: 7.5; 325 TABLET ORAL at 11:22

## 2018-03-27 RX ADMIN — MORPHINE SULFATE 2 MG: 2 INJECTION, SOLUTION INTRAMUSCULAR; INTRAVENOUS at 01:30

## 2018-03-27 RX ADMIN — ONDANSETRON 4 MG: 2 INJECTION INTRAMUSCULAR; INTRAVENOUS at 11:38

## 2018-03-27 RX ADMIN — SODIUM CHLORIDE 100 ML/HR: 9 INJECTION, SOLUTION INTRAVENOUS at 08:59

## 2018-03-27 RX ADMIN — ENOXAPARIN SODIUM 40 MG: 40 INJECTION SUBCUTANEOUS at 08:31

## 2018-03-27 RX ADMIN — MICAFUNGIN SODIUM 100 MG: 20 INJECTION, POWDER, LYOPHILIZED, FOR SOLUTION INTRAVENOUS at 11:12

## 2018-03-27 RX ADMIN — KETOROLAC TROMETHAMINE 30 MG: 30 INJECTION, SOLUTION INTRAMUSCULAR at 08:59

## 2018-03-27 RX ADMIN — MORPHINE SULFATE 2 MG: 2 INJECTION, SOLUTION INTRAMUSCULAR; INTRAVENOUS at 15:12

## 2018-03-27 RX ADMIN — ACETAMINOPHEN 1000 MG: 500 TABLET ORAL at 15:12

## 2018-03-27 RX ADMIN — ERTAPENEM SODIUM 1 G: 1 INJECTION, POWDER, LYOPHILIZED, FOR SOLUTION INTRAMUSCULAR; INTRAVENOUS at 15:12

## 2018-03-27 RX ADMIN — DIAZEPAM 5 MG: 5 TABLET ORAL at 08:59

## 2018-03-27 RX ADMIN — MORPHINE SULFATE 2 MG: 2 INJECTION, SOLUTION INTRAMUSCULAR; INTRAVENOUS at 08:31

## 2018-03-27 RX ADMIN — DIAZEPAM 5 MG: 5 TABLET ORAL at 15:12

## 2018-03-27 RX ADMIN — DIAZEPAM 5 MG: 5 TABLET ORAL at 23:12

## 2018-03-27 RX ADMIN — DAPTOMYCIN 450 MG: 500 INJECTION, POWDER, LYOPHILIZED, FOR SOLUTION INTRAVENOUS at 11:12

## 2018-03-27 RX ADMIN — ACETAMINOPHEN 1000 MG: 500 TABLET ORAL at 08:32

## 2018-03-27 RX ADMIN — ACETAMINOPHEN 1000 MG: 500 TABLET ORAL at 20:28

## 2018-03-27 RX ADMIN — ONDANSETRON 4 MG: 2 INJECTION INTRAMUSCULAR; INTRAVENOUS at 01:30

## 2018-03-27 RX ADMIN — KETOROLAC TROMETHAMINE 30 MG: 30 INJECTION, SOLUTION INTRAMUSCULAR at 17:50

## 2018-03-27 RX ADMIN — MORPHINE SULFATE 2 MG: 2 INJECTION, SOLUTION INTRAMUSCULAR; INTRAVENOUS at 13:25

## 2018-03-27 RX ADMIN — HYDROMORPHONE HYDROCHLORIDE 1 MG: 10 INJECTION INTRAMUSCULAR; INTRAVENOUS; SUBCUTANEOUS at 18:35

## 2018-03-27 RX ADMIN — SODIUM CHLORIDE 100 ML/HR: 9 INJECTION, SOLUTION INTRAVENOUS at 20:28

## 2018-03-27 RX ADMIN — MORPHINE SULFATE 2 MG: 2 INJECTION, SOLUTION INTRAMUSCULAR; INTRAVENOUS at 06:12

## 2018-03-27 RX ADMIN — HYDROMORPHONE HYDROCHLORIDE 1 MG: 10 INJECTION INTRAMUSCULAR; INTRAVENOUS; SUBCUTANEOUS at 23:12

## 2018-03-27 NOTE — ANESTHESIA POSTPROCEDURE EVALUATION
Patient: Sultana Siegel    Procedure Summary     Date:  03/26/18 Room / Location:   [facility] /  [facility]    Anesthesia Start:  1640 Anesthesia Stop:  2002    Procedure:  SUBTOTAL COLECTOMY WITH DEBRIEDMENT OF RETROPERITONEAL ABCESS, OMENTECTOMY (N/A Abdomen) Diagnosis:      Surgeon:  Mahin Abebe MD Provider:  Rogers Leach MD    Anesthesia Type:  general ASA Status:  1          Anesthesia Type: general  Last vitals  BP   127/88 (03/26/18 1624)   Temp   97.4 °F (36.3 °C) (03/26/18 1306)   Pulse   64 (03/26/18 1624)   Resp   18 (03/26/18 1624)     SpO2   96 % (03/26/18 1624)     Post Anesthesia Care and Evaluation    Patient location during evaluation: PACU  Patient participation: complete - patient participated  Level of consciousness: awake and alert  Pain score: 0  Pain management: adequate  Airway patency: patent  Anesthetic complications: No anesthetic complications  PONV Status: none  Cardiovascular status: hemodynamically stable and acceptable  Respiratory status: nonlabored ventilation, acceptable and nasal cannula  Hydration status: acceptable

## 2018-03-28 LAB
ALBUMIN SERPL-MCNC: 2.8 G/DL (ref 3.2–4.8)
ALBUMIN/GLOB SERPL: 1.1 G/DL (ref 1.5–2.5)
ALP SERPL-CCNC: 71 U/L (ref 25–100)
ALT SERPL W P-5'-P-CCNC: 12 U/L (ref 7–40)
ANION GAP SERPL CALCULATED.3IONS-SCNC: 6 MMOL/L (ref 3–11)
AST SERPL-CCNC: 16 U/L (ref 0–33)
BILIRUB SERPL-MCNC: 0.1 MG/DL (ref 0.3–1.2)
BUN BLD-MCNC: 7 MG/DL (ref 9–23)
BUN/CREAT SERPL: 10 (ref 7–25)
CALCIUM SPEC-SCNC: 8.5 MG/DL (ref 8.7–10.4)
CHLORIDE SERPL-SCNC: 108 MMOL/L (ref 99–109)
CO2 SERPL-SCNC: 26 MMOL/L (ref 20–31)
CREAT BLD-MCNC: 0.7 MG/DL (ref 0.6–1.3)
CYTO UR: NORMAL
DEPRECATED RDW RBC AUTO: 74.7 FL (ref 37–54)
ERYTHROCYTE [DISTWIDTH] IN BLOOD BY AUTOMATED COUNT: 26.2 % (ref 11.3–14.5)
GFR SERPL CREATININE-BSD FRML MDRD: 94 ML/MIN/1.73
GLOBULIN UR ELPH-MCNC: 2.6 GM/DL
GLUCOSE BLD-MCNC: 84 MG/DL (ref 70–100)
HCT VFR BLD AUTO: 22.9 % (ref 34.5–44)
HGB BLD-MCNC: 6.6 G/DL (ref 11.5–15.5)
LAB AP CASE REPORT: NORMAL
LAB AP CLINICAL INFORMATION: NORMAL
Lab: NORMAL
MCH RBC QN AUTO: 22.8 PG (ref 27–31)
MCHC RBC AUTO-ENTMCNC: 28.8 G/DL (ref 32–36)
MCV RBC AUTO: 79 FL (ref 80–99)
PATH REPORT.FINAL DX SPEC: NORMAL
PATH REPORT.GROSS SPEC: NORMAL
PLATELET # BLD AUTO: 696 10*3/MM3 (ref 150–450)
PMV BLD AUTO: 8.2 FL (ref 6–12)
POTASSIUM BLD-SCNC: 4.5 MMOL/L (ref 3.5–5.5)
PROT SERPL-MCNC: 5.4 G/DL (ref 5.7–8.2)
RBC # BLD AUTO: 2.9 10*6/MM3 (ref 3.89–5.14)
SODIUM BLD-SCNC: 140 MMOL/L (ref 132–146)
WBC NRBC COR # BLD: 5.18 10*3/MM3 (ref 3.5–10.8)

## 2018-03-28 PROCEDURE — P9016 RBC LEUKOCYTES REDUCED: HCPCS

## 2018-03-28 PROCEDURE — 86900 BLOOD TYPING SEROLOGIC ABO: CPT

## 2018-03-28 PROCEDURE — 36430 TRANSFUSION BLD/BLD COMPNT: CPT

## 2018-03-28 PROCEDURE — 25010000002 KETOROLAC TROMETHAMINE PER 15 MG: Performed by: COLON & RECTAL SURGERY

## 2018-03-28 PROCEDURE — 25010000002 ENOXAPARIN PER 10 MG: Performed by: COLON & RECTAL SURGERY

## 2018-03-28 PROCEDURE — 25010000002 ONDANSETRON PER 1 MG: Performed by: COLON & RECTAL SURGERY

## 2018-03-28 PROCEDURE — 25010000002 ERTAPENEM: Performed by: INTERNAL MEDICINE

## 2018-03-28 PROCEDURE — 25010000002 HYDROMORPHONE PER 4 MG: Performed by: COLON & RECTAL SURGERY

## 2018-03-28 PROCEDURE — 85027 COMPLETE CBC AUTOMATED: CPT | Performed by: INTERNAL MEDICINE

## 2018-03-28 PROCEDURE — 80053 COMPREHEN METABOLIC PANEL: CPT | Performed by: INTERNAL MEDICINE

## 2018-03-28 PROCEDURE — 99233 SBSQ HOSP IP/OBS HIGH 50: CPT | Performed by: INTERNAL MEDICINE

## 2018-03-28 RX ADMIN — SODIUM CHLORIDE 100 ML/HR: 9 INJECTION, SOLUTION INTRAVENOUS at 07:11

## 2018-03-28 RX ADMIN — MICAFUNGIN SODIUM 100 MG: 20 INJECTION, POWDER, LYOPHILIZED, FOR SOLUTION INTRAVENOUS at 13:14

## 2018-03-28 RX ADMIN — HYDROCODONE BITARTRATE AND ACETAMINOPHEN 1 TABLET: 7.5; 325 TABLET ORAL at 13:25

## 2018-03-28 RX ADMIN — DIAZEPAM 5 MG: 5 TABLET ORAL at 21:54

## 2018-03-28 RX ADMIN — KETOROLAC TROMETHAMINE 30 MG: 30 INJECTION, SOLUTION INTRAMUSCULAR at 01:43

## 2018-03-28 RX ADMIN — HYDROMORPHONE HYDROCHLORIDE 1 MG: 10 INJECTION INTRAMUSCULAR; INTRAVENOUS; SUBCUTANEOUS at 22:01

## 2018-03-28 RX ADMIN — KETOROLAC TROMETHAMINE 30 MG: 30 INJECTION, SOLUTION INTRAMUSCULAR at 10:39

## 2018-03-28 RX ADMIN — SODIUM CHLORIDE 100 ML/HR: 9 INJECTION, SOLUTION INTRAVENOUS at 20:44

## 2018-03-28 RX ADMIN — ONDANSETRON 4 MG: 2 INJECTION INTRAMUSCULAR; INTRAVENOUS at 13:29

## 2018-03-28 RX ADMIN — HYDROMORPHONE HYDROCHLORIDE 1 MG: 10 INJECTION INTRAMUSCULAR; INTRAVENOUS; SUBCUTANEOUS at 16:44

## 2018-03-28 RX ADMIN — KETOROLAC TROMETHAMINE 30 MG: 30 INJECTION, SOLUTION INTRAMUSCULAR at 19:53

## 2018-03-28 RX ADMIN — HYDROMORPHONE HYDROCHLORIDE 1 MG: 10 INJECTION INTRAMUSCULAR; INTRAVENOUS; SUBCUTANEOUS at 07:10

## 2018-03-28 RX ADMIN — ACETAMINOPHEN 1000 MG: 500 TABLET ORAL at 18:32

## 2018-03-28 RX ADMIN — DIAZEPAM 5 MG: 5 TABLET ORAL at 07:10

## 2018-03-28 RX ADMIN — ERTAPENEM SODIUM 1 G: 1 INJECTION, POWDER, LYOPHILIZED, FOR SOLUTION INTRAMUSCULAR; INTRAVENOUS at 20:43

## 2018-03-28 RX ADMIN — ENOXAPARIN SODIUM 40 MG: 40 INJECTION SUBCUTANEOUS at 09:44

## 2018-03-28 RX ADMIN — ACETAMINOPHEN 1000 MG: 500 TABLET ORAL at 09:44

## 2018-03-29 PROBLEM — D50.9 IRON DEFICIENCY ANEMIA: Status: ACTIVE | Noted: 2018-03-29

## 2018-03-29 LAB
ABO + RH BLD: NORMAL
ANION GAP SERPL CALCULATED.3IONS-SCNC: 5 MMOL/L (ref 3–11)
BASOPHILS # BLD AUTO: 0.03 10*3/MM3 (ref 0–0.2)
BASOPHILS NFR BLD AUTO: 0.6 % (ref 0–1)
BH BB BLOOD EXPIRATION DATE: NORMAL
BH BB BLOOD TYPE BARCODE: 600
BH BB DISPENSE STATUS: NORMAL
BH BB PRODUCT CODE: NORMAL
BH BB UNIT NUMBER: NORMAL
BUN BLD-MCNC: 6 MG/DL (ref 9–23)
BUN/CREAT SERPL: 8.6 (ref 7–25)
CALCIUM SPEC-SCNC: 8.4 MG/DL (ref 8.7–10.4)
CHLORIDE SERPL-SCNC: 110 MMOL/L (ref 99–109)
CO2 SERPL-SCNC: 28 MMOL/L (ref 20–31)
CREAT BLD-MCNC: 0.7 MG/DL (ref 0.6–1.3)
DEPRECATED RDW RBC AUTO: 71 FL (ref 37–54)
EOSINOPHIL # BLD AUTO: 0.44 10*3/MM3 (ref 0–0.3)
EOSINOPHIL NFR BLD AUTO: 8.2 % (ref 0–3)
ERYTHROCYTE [DISTWIDTH] IN BLOOD BY AUTOMATED COUNT: 24.8 % (ref 11.3–14.5)
GFR SERPL CREATININE-BSD FRML MDRD: 94 ML/MIN/1.73
GLUCOSE BLD-MCNC: 78 MG/DL (ref 70–100)
HCT VFR BLD AUTO: 26.7 % (ref 34.5–44)
HGB BLD-MCNC: 7.8 G/DL (ref 11.5–15.5)
IMM GRANULOCYTES # BLD: 0.01 10*3/MM3 (ref 0–0.03)
IMM GRANULOCYTES NFR BLD: 0.2 % (ref 0–0.6)
LYMPHOCYTES # BLD AUTO: 0.92 10*3/MM3 (ref 0.6–4.8)
LYMPHOCYTES NFR BLD AUTO: 17.2 % (ref 24–44)
MCH RBC QN AUTO: 23 PG (ref 27–31)
MCHC RBC AUTO-ENTMCNC: 29.2 G/DL (ref 32–36)
MCV RBC AUTO: 78.8 FL (ref 80–99)
MONOCYTES # BLD AUTO: 0.43 10*3/MM3 (ref 0–1)
MONOCYTES NFR BLD AUTO: 8.1 % (ref 0–12)
NEUTROPHILS # BLD AUTO: 3.51 10*3/MM3 (ref 1.5–8.3)
NEUTROPHILS NFR BLD AUTO: 65.7 % (ref 41–71)
PLATELET # BLD AUTO: 701 10*3/MM3 (ref 150–450)
PMV BLD AUTO: 8.1 FL (ref 6–12)
POTASSIUM BLD-SCNC: 3.8 MMOL/L (ref 3.5–5.5)
RBC # BLD AUTO: 3.39 10*6/MM3 (ref 3.89–5.14)
SODIUM BLD-SCNC: 143 MMOL/L (ref 132–146)
UNIT  ABO: NORMAL
UNIT  RH: NORMAL
WBC NRBC COR # BLD: 5.34 10*3/MM3 (ref 3.5–10.8)

## 2018-03-29 PROCEDURE — 25010000002 ONDANSETRON PER 1 MG: Performed by: COLON & RECTAL SURGERY

## 2018-03-29 PROCEDURE — 25010000002 ERTAPENEM: Performed by: INTERNAL MEDICINE

## 2018-03-29 PROCEDURE — 25010000002 KETOROLAC TROMETHAMINE PER 15 MG: Performed by: COLON & RECTAL SURGERY

## 2018-03-29 PROCEDURE — 85025 COMPLETE CBC W/AUTO DIFF WBC: CPT | Performed by: INTERNAL MEDICINE

## 2018-03-29 PROCEDURE — 25010000002 HYDROMORPHONE PER 4 MG: Performed by: COLON & RECTAL SURGERY

## 2018-03-29 PROCEDURE — 99232 SBSQ HOSP IP/OBS MODERATE 35: CPT | Performed by: PHYSICIAN ASSISTANT

## 2018-03-29 PROCEDURE — 25010000002 ENOXAPARIN PER 10 MG: Performed by: COLON & RECTAL SURGERY

## 2018-03-29 PROCEDURE — 80048 BASIC METABOLIC PNL TOTAL CA: CPT | Performed by: INTERNAL MEDICINE

## 2018-03-29 RX ADMIN — HYDROCODONE BITARTRATE AND ACETAMINOPHEN 1 TABLET: 7.5; 325 TABLET ORAL at 13:24

## 2018-03-29 RX ADMIN — ENOXAPARIN SODIUM 40 MG: 40 INJECTION SUBCUTANEOUS at 08:26

## 2018-03-29 RX ADMIN — HYDROMORPHONE HYDROCHLORIDE 1 MG: 10 INJECTION INTRAMUSCULAR; INTRAVENOUS; SUBCUTANEOUS at 20:11

## 2018-03-29 RX ADMIN — ERTAPENEM SODIUM 1 G: 1 INJECTION, POWDER, LYOPHILIZED, FOR SOLUTION INTRAMUSCULAR; INTRAVENOUS at 17:03

## 2018-03-29 RX ADMIN — KETOROLAC TROMETHAMINE 30 MG: 30 INJECTION, SOLUTION INTRAMUSCULAR at 10:35

## 2018-03-29 RX ADMIN — KETOROLAC TROMETHAMINE 30 MG: 30 INJECTION, SOLUTION INTRAMUSCULAR at 17:03

## 2018-03-29 RX ADMIN — HYDROMORPHONE HYDROCHLORIDE 1 MG: 10 INJECTION INTRAMUSCULAR; INTRAVENOUS; SUBCUTANEOUS at 11:17

## 2018-03-29 RX ADMIN — HYDROCODONE BITARTRATE AND ACETAMINOPHEN 1 TABLET: 7.5; 325 TABLET ORAL at 08:26

## 2018-03-29 RX ADMIN — HYDROMORPHONE HYDROCHLORIDE 1 MG: 10 INJECTION INTRAMUSCULAR; INTRAVENOUS; SUBCUTANEOUS at 06:22

## 2018-03-29 RX ADMIN — ACETAMINOPHEN 1000 MG: 500 TABLET ORAL at 08:26

## 2018-03-29 RX ADMIN — ACETAMINOPHEN 1000 MG: 500 TABLET ORAL at 17:03

## 2018-03-29 RX ADMIN — SODIUM CHLORIDE 100 ML/HR: 9 INJECTION, SOLUTION INTRAVENOUS at 04:38

## 2018-03-29 RX ADMIN — ACETAMINOPHEN 1000 MG: 500 TABLET ORAL at 21:09

## 2018-03-29 RX ADMIN — MICAFUNGIN SODIUM 100 MG: 20 INJECTION, POWDER, LYOPHILIZED, FOR SOLUTION INTRAVENOUS at 13:12

## 2018-03-29 RX ADMIN — ONDANSETRON 4 MG: 2 INJECTION INTRAMUSCULAR; INTRAVENOUS at 13:24

## 2018-03-29 RX ADMIN — KETOROLAC TROMETHAMINE 30 MG: 30 INJECTION, SOLUTION INTRAMUSCULAR at 02:05

## 2018-03-29 RX ADMIN — DIAZEPAM 5 MG: 5 TABLET ORAL at 21:09

## 2018-03-30 LAB
ABO + RH BLD: NORMAL
ABO + RH BLD: NORMAL
ANION GAP SERPL CALCULATED.3IONS-SCNC: 7 MMOL/L (ref 3–11)
BACTERIA SPEC AEROBE CULT: NORMAL
BH BB BLOOD EXPIRATION DATE: NORMAL
BH BB BLOOD EXPIRATION DATE: NORMAL
BH BB BLOOD TYPE BARCODE: 600
BH BB BLOOD TYPE BARCODE: 600
BH BB DISPENSE STATUS: NORMAL
BH BB DISPENSE STATUS: NORMAL
BH BB PRODUCT CODE: NORMAL
BH BB PRODUCT CODE: NORMAL
BH BB UNIT NUMBER: NORMAL
BH BB UNIT NUMBER: NORMAL
BUN BLD-MCNC: <5 MG/DL (ref 9–23)
BUN/CREAT SERPL: ABNORMAL (ref 7–25)
CALCIUM SPEC-SCNC: 8.7 MG/DL (ref 8.7–10.4)
CHLORIDE SERPL-SCNC: 105 MMOL/L (ref 99–109)
CO2 SERPL-SCNC: 28 MMOL/L (ref 20–31)
CREAT BLD-MCNC: 0.6 MG/DL (ref 0.6–1.3)
DEPRECATED RDW RBC AUTO: 69.7 FL (ref 37–54)
ERYTHROCYTE [DISTWIDTH] IN BLOOD BY AUTOMATED COUNT: 24.8 % (ref 11.3–14.5)
GFR SERPL CREATININE-BSD FRML MDRD: 112 ML/MIN/1.73
GLUCOSE BLD-MCNC: 91 MG/DL (ref 70–100)
GRAM STN SPEC: NORMAL
GRAM STN SPEC: NORMAL
HCT VFR BLD AUTO: 27.3 % (ref 34.5–44)
HGB BLD-MCNC: 8.1 G/DL (ref 11.5–15.5)
MCH RBC QN AUTO: 23 PG (ref 27–31)
MCHC RBC AUTO-ENTMCNC: 29.7 G/DL (ref 32–36)
MCV RBC AUTO: 77.6 FL (ref 80–99)
PLATELET # BLD AUTO: 736 10*3/MM3 (ref 150–450)
PMV BLD AUTO: 8.1 FL (ref 6–12)
POTASSIUM BLD-SCNC: 3.2 MMOL/L (ref 3.5–5.5)
RBC # BLD AUTO: 3.52 10*6/MM3 (ref 3.89–5.14)
SODIUM BLD-SCNC: 140 MMOL/L (ref 132–146)
UNIT  ABO: NORMAL
UNIT  ABO: NORMAL
UNIT  RH: NORMAL
UNIT  RH: NORMAL
WBC NRBC COR # BLD: 5.13 10*3/MM3 (ref 3.5–10.8)

## 2018-03-30 PROCEDURE — 25010000002 KETOROLAC TROMETHAMINE PER 15 MG: Performed by: COLON & RECTAL SURGERY

## 2018-03-30 PROCEDURE — 25010000002 ENOXAPARIN PER 10 MG: Performed by: COLON & RECTAL SURGERY

## 2018-03-30 PROCEDURE — 85027 COMPLETE CBC AUTOMATED: CPT | Performed by: PHYSICIAN ASSISTANT

## 2018-03-30 PROCEDURE — 80048 BASIC METABOLIC PNL TOTAL CA: CPT | Performed by: PHYSICIAN ASSISTANT

## 2018-03-30 PROCEDURE — 25010000002 ONDANSETRON PER 1 MG: Performed by: COLON & RECTAL SURGERY

## 2018-03-30 PROCEDURE — 99232 SBSQ HOSP IP/OBS MODERATE 35: CPT | Performed by: NURSE PRACTITIONER

## 2018-03-30 PROCEDURE — 25810000003 SODIUM CHLORIDE 0.9 % WITH KCL 20 MEQ 20-0.9 MEQ/L-% SOLUTION: Performed by: NURSE PRACTITIONER

## 2018-03-30 PROCEDURE — 25010000002 HYDROMORPHONE PER 4 MG: Performed by: COLON & RECTAL SURGERY

## 2018-03-30 PROCEDURE — 25010000002 ERTAPENEM: Performed by: INTERNAL MEDICINE

## 2018-03-30 RX ORDER — PYRIDOSTIGMINE BROMIDE 60 MG/1
30 TABLET ORAL EVERY 8 HOURS SCHEDULED
Status: DISCONTINUED | OUTPATIENT
Start: 2018-03-30 | End: 2018-03-31 | Stop reason: HOSPADM

## 2018-03-30 RX ORDER — SODIUM CHLORIDE AND POTASSIUM CHLORIDE 150; 900 MG/100ML; MG/100ML
75 INJECTION, SOLUTION INTRAVENOUS CONTINUOUS
Status: DISCONTINUED | OUTPATIENT
Start: 2018-03-30 | End: 2018-03-30

## 2018-03-30 RX ADMIN — ENOXAPARIN SODIUM 40 MG: 40 INJECTION SUBCUTANEOUS at 08:33

## 2018-03-30 RX ADMIN — ONDANSETRON 4 MG: 2 INJECTION INTRAMUSCULAR; INTRAVENOUS at 08:37

## 2018-03-30 RX ADMIN — HYDROMORPHONE HYDROCHLORIDE 1 MG: 10 INJECTION INTRAMUSCULAR; INTRAVENOUS; SUBCUTANEOUS at 17:11

## 2018-03-30 RX ADMIN — ACETAMINOPHEN 1000 MG: 500 TABLET ORAL at 08:33

## 2018-03-30 RX ADMIN — KETOROLAC TROMETHAMINE 30 MG: 30 INJECTION, SOLUTION INTRAMUSCULAR at 02:12

## 2018-03-30 RX ADMIN — MICAFUNGIN SODIUM 100 MG: 20 INJECTION, POWDER, LYOPHILIZED, FOR SOLUTION INTRAVENOUS at 12:29

## 2018-03-30 RX ADMIN — HYDROMORPHONE HYDROCHLORIDE 1 MG: 10 INJECTION INTRAMUSCULAR; INTRAVENOUS; SUBCUTANEOUS at 10:05

## 2018-03-30 RX ADMIN — PYRIDOSTIGMINE BROMIDE 30 MG: 60 TABLET ORAL at 22:11

## 2018-03-30 RX ADMIN — HYDROCODONE BITARTRATE AND ACETAMINOPHEN 1 TABLET: 7.5; 325 TABLET ORAL at 21:00

## 2018-03-30 RX ADMIN — HYDROCODONE BITARTRATE AND ACETAMINOPHEN 1 TABLET: 7.5; 325 TABLET ORAL at 08:36

## 2018-03-30 RX ADMIN — ACETAMINOPHEN 1000 MG: 500 TABLET ORAL at 20:04

## 2018-03-30 RX ADMIN — KETOROLAC TROMETHAMINE 30 MG: 30 INJECTION, SOLUTION INTRAMUSCULAR at 20:04

## 2018-03-30 RX ADMIN — HYDROMORPHONE HYDROCHLORIDE 1 MG: 10 INJECTION INTRAMUSCULAR; INTRAVENOUS; SUBCUTANEOUS at 23:15

## 2018-03-30 RX ADMIN — HYDROMORPHONE HYDROCHLORIDE 1 MG: 10 INJECTION INTRAMUSCULAR; INTRAVENOUS; SUBCUTANEOUS at 02:52

## 2018-03-30 RX ADMIN — POTASSIUM CHLORIDE AND SODIUM CHLORIDE 75 ML/HR: 900; 150 INJECTION, SOLUTION INTRAVENOUS at 16:30

## 2018-03-30 RX ADMIN — ERTAPENEM SODIUM 1 G: 1 INJECTION, POWDER, LYOPHILIZED, FOR SOLUTION INTRAMUSCULAR; INTRAVENOUS at 16:29

## 2018-03-30 RX ADMIN — KETOROLAC TROMETHAMINE 30 MG: 30 INJECTION, SOLUTION INTRAMUSCULAR at 12:29

## 2018-03-30 RX ADMIN — DIAZEPAM 5 MG: 5 TABLET ORAL at 20:04

## 2018-03-31 VITALS
WEIGHT: 163 LBS | HEART RATE: 64 BPM | DIASTOLIC BLOOD PRESSURE: 77 MMHG | HEIGHT: 68 IN | SYSTOLIC BLOOD PRESSURE: 115 MMHG | OXYGEN SATURATION: 91 % | BODY MASS INDEX: 24.71 KG/M2 | RESPIRATION RATE: 18 BRPM | TEMPERATURE: 97.8 F

## 2018-03-31 PROBLEM — K63.1 COLON PERFORATION (HCC): Status: RESOLVED | Noted: 2018-03-26 | Resolved: 2018-03-31

## 2018-03-31 PROBLEM — D50.9 IRON DEFICIENCY ANEMIA: Status: RESOLVED | Noted: 2018-03-29 | Resolved: 2018-03-31

## 2018-03-31 PROBLEM — K50.914: Status: RESOLVED | Noted: 2018-03-19 | Resolved: 2018-03-31

## 2018-03-31 PROBLEM — IMO0002 ABDOMINAL ABSCESS: Status: RESOLVED | Noted: 2018-03-19 | Resolved: 2018-03-31

## 2018-03-31 LAB
MAGNESIUM SERPL-MCNC: 1.8 MG/DL (ref 1.3–2.7)
POTASSIUM BLD-SCNC: 3.6 MMOL/L (ref 3.5–5.5)

## 2018-03-31 PROCEDURE — 25010000002 ENOXAPARIN PER 10 MG: Performed by: COLON & RECTAL SURGERY

## 2018-03-31 PROCEDURE — 25010000002 HYDROMORPHONE PER 4 MG: Performed by: COLON & RECTAL SURGERY

## 2018-03-31 PROCEDURE — 25010000002 KETOROLAC TROMETHAMINE PER 15 MG: Performed by: COLON & RECTAL SURGERY

## 2018-03-31 PROCEDURE — 84132 ASSAY OF SERUM POTASSIUM: CPT | Performed by: COLON & RECTAL SURGERY

## 2018-03-31 PROCEDURE — 83735 ASSAY OF MAGNESIUM: CPT | Performed by: COLON & RECTAL SURGERY

## 2018-03-31 PROCEDURE — 99239 HOSP IP/OBS DSCHRG MGMT >30: CPT | Performed by: INTERNAL MEDICINE

## 2018-03-31 RX ORDER — HYDROCODONE BITARTRATE AND ACETAMINOPHEN 7.5; 325 MG/1; MG/1
1 TABLET ORAL EVERY 4 HOURS PRN
Qty: 30 TABLET | Refills: 0 | Status: SHIPPED | OUTPATIENT
Start: 2018-03-31 | End: 2018-04-05

## 2018-03-31 RX ORDER — ONDANSETRON 4 MG/1
4 TABLET, FILM COATED ORAL DAILY PRN
Qty: 30 TABLET | Refills: 1 | Status: SHIPPED | OUTPATIENT
Start: 2018-03-31 | End: 2019-03-31

## 2018-03-31 RX ORDER — POTASSIUM CHLORIDE 750 MG/1
40 CAPSULE, EXTENDED RELEASE ORAL AS NEEDED
Status: DISCONTINUED | OUTPATIENT
Start: 2018-03-31 | End: 2018-03-31 | Stop reason: HOSPADM

## 2018-03-31 RX ORDER — POTASSIUM CHLORIDE 7.45 MG/ML
10 INJECTION INTRAVENOUS
Status: DISCONTINUED | OUTPATIENT
Start: 2018-03-31 | End: 2018-03-31 | Stop reason: HOSPADM

## 2018-03-31 RX ORDER — MAGNESIUM SULFATE HEPTAHYDRATE 40 MG/ML
4 INJECTION, SOLUTION INTRAVENOUS AS NEEDED
Status: DISCONTINUED | OUTPATIENT
Start: 2018-03-31 | End: 2018-03-31 | Stop reason: HOSPADM

## 2018-03-31 RX ORDER — AMOXICILLIN AND CLAVULANATE POTASSIUM 875; 125 MG/1; MG/1
1 TABLET, FILM COATED ORAL 2 TIMES DAILY
Qty: 14 TABLET | Refills: 0 | Status: ON HOLD | OUTPATIENT
Start: 2018-03-31 | End: 2019-09-18

## 2018-03-31 RX ORDER — MAGNESIUM SULFATE HEPTAHYDRATE 40 MG/ML
2 INJECTION, SOLUTION INTRAVENOUS AS NEEDED
Status: DISCONTINUED | OUTPATIENT
Start: 2018-03-31 | End: 2018-03-31 | Stop reason: HOSPADM

## 2018-03-31 RX ORDER — AMOXICILLIN AND CLAVULANATE POTASSIUM 875; 125 MG/1; MG/1
1 TABLET, FILM COATED ORAL EVERY 12 HOURS SCHEDULED
Qty: 14 TABLET | Refills: 0 | Status: SHIPPED | OUTPATIENT
Start: 2018-04-01 | End: 2018-03-31 | Stop reason: SDUPTHER

## 2018-03-31 RX ORDER — POTASSIUM CHLORIDE 1.5 G/1.77G
40 POWDER, FOR SOLUTION ORAL AS NEEDED
Status: DISCONTINUED | OUTPATIENT
Start: 2018-03-31 | End: 2018-03-31 | Stop reason: HOSPADM

## 2018-03-31 RX ORDER — AMOXICILLIN AND CLAVULANATE POTASSIUM 875; 125 MG/1; MG/1
1 TABLET, FILM COATED ORAL EVERY 12 HOURS SCHEDULED
Status: DISCONTINUED | OUTPATIENT
Start: 2018-04-01 | End: 2018-03-31 | Stop reason: HOSPADM

## 2018-03-31 RX ADMIN — PYRIDOSTIGMINE BROMIDE 30 MG: 60 TABLET ORAL at 08:52

## 2018-03-31 RX ADMIN — ACETAMINOPHEN 1000 MG: 500 TABLET ORAL at 08:52

## 2018-03-31 RX ADMIN — HYDROMORPHONE HYDROCHLORIDE 1 MG: 10 INJECTION INTRAMUSCULAR; INTRAVENOUS; SUBCUTANEOUS at 05:55

## 2018-03-31 RX ADMIN — PYRIDOSTIGMINE BROMIDE 30 MG: 60 TABLET ORAL at 14:29

## 2018-03-31 RX ADMIN — POTASSIUM CHLORIDE 40 MEQ: 750 CAPSULE, EXTENDED RELEASE ORAL at 14:29

## 2018-03-31 RX ADMIN — PYRIDOSTIGMINE BROMIDE 30 MG: 60 TABLET ORAL at 05:56

## 2018-03-31 RX ADMIN — DIAZEPAM 5 MG: 5 TABLET ORAL at 05:55

## 2018-03-31 RX ADMIN — MAGNESIUM SULFATE IN WATER 4 G: 40 INJECTION, SOLUTION INTRAVENOUS at 14:29

## 2018-03-31 RX ADMIN — KETOROLAC TROMETHAMINE 30 MG: 30 INJECTION, SOLUTION INTRAMUSCULAR at 12:43

## 2018-03-31 RX ADMIN — HYDROMORPHONE HYDROCHLORIDE 1 MG: 10 INJECTION INTRAMUSCULAR; INTRAVENOUS; SUBCUTANEOUS at 08:52

## 2018-03-31 RX ADMIN — ENOXAPARIN SODIUM 40 MG: 40 INJECTION SUBCUTANEOUS at 08:52

## 2018-04-02 LAB
BACTERIA SPEC ANAEROBE CULT: ABNORMAL
BACTERIA SPEC ANAEROBE CULT: ABNORMAL

## 2018-05-07 ENCOUNTER — TRANSCRIBE ORDERS (OUTPATIENT)
Dept: ADMINISTRATIVE | Facility: HOSPITAL | Age: 38
End: 2018-05-07

## 2018-05-07 DIAGNOSIS — K50.10: Primary | ICD-10-CM

## 2018-05-07 DIAGNOSIS — K50.10 CROHN'S DISEASE OF COLON WITHOUT COMPLICATION (HCC): Primary | ICD-10-CM

## 2018-05-07 LAB
FUNGUS WND CULT: NORMAL
MYCOBACTERIUM SPEC CULT: NORMAL
NIGHT BLUE STAIN TISS: NORMAL

## 2018-06-12 ENCOUNTER — HOSPITAL ENCOUNTER (OUTPATIENT)
Dept: BONE DENSITY | Facility: HOSPITAL | Age: 38
Discharge: HOME | End: 2018-06-12
Attending: COLON & RECTAL SURGERY | Admitting: COLON & RECTAL SURGERY

## 2018-06-12 DIAGNOSIS — K50.10 CROHN'S DISEASE OF COLON WITHOUT COMPLICATION (HCC): ICD-10-CM

## 2018-06-12 PROCEDURE — 77080 DXA BONE DENSITY AXIAL: CPT

## 2019-09-18 ENCOUNTER — APPOINTMENT (OUTPATIENT)
Dept: GENERAL RADIOLOGY | Facility: HOSPITAL | Age: 39
End: 2019-09-18

## 2019-09-18 ENCOUNTER — APPOINTMENT (OUTPATIENT)
Dept: CT IMAGING | Facility: HOSPITAL | Age: 39
End: 2019-09-18

## 2019-09-18 ENCOUNTER — HOSPITAL ENCOUNTER (INPATIENT)
Facility: HOSPITAL | Age: 39
LOS: 2 days | Discharge: HOME | End: 2019-09-20
Attending: INTERNAL MEDICINE | Admitting: HOSPITALIST

## 2019-09-18 PROBLEM — K50.90 EXACERBATION OF CROHN'S DISEASE (HCC): Status: ACTIVE | Noted: 2019-09-18

## 2019-09-18 PROBLEM — R63.4 WEIGHT LOSS: Status: ACTIVE | Noted: 2019-09-18

## 2019-09-18 PROBLEM — R10.9 ABDOMINAL PAIN: Status: ACTIVE | Noted: 2019-09-18

## 2019-09-18 PROBLEM — R50.9 FEVER: Status: ACTIVE | Noted: 2019-09-18

## 2019-09-18 PROBLEM — R19.7 DIARRHEA: Status: ACTIVE | Noted: 2019-09-18

## 2019-09-18 LAB
ABO GROUP BLD: NORMAL
ALBUMIN SERPL-MCNC: 3.3 G/DL (ref 3.5–5.2)
ALBUMIN/GLOB SERPL: 0.9 G/DL
ALP SERPL-CCNC: 74 U/L (ref 39–117)
ALT SERPL W P-5'-P-CCNC: 5 U/L (ref 1–33)
ANION GAP SERPL CALCULATED.3IONS-SCNC: 13 MMOL/L (ref 5–15)
AST SERPL-CCNC: 7 U/L (ref 1–32)
BACTERIA UR QL AUTO: ABNORMAL /HPF
BASOPHILS # BLD MANUAL: 0 10*3/MM3 (ref 0–0.2)
BASOPHILS NFR BLD AUTO: 0 % (ref 0–1.5)
BILIRUB SERPL-MCNC: 0.4 MG/DL (ref 0.2–1.2)
BILIRUB UR QL STRIP: NEGATIVE
BLD GP AB SCN SERPL QL: NEGATIVE
BUN BLD-MCNC: 6 MG/DL (ref 6–20)
BUN/CREAT SERPL: 8 (ref 7–25)
CALCIUM SPEC-SCNC: 8.6 MG/DL (ref 8.6–10.5)
CHLORIDE SERPL-SCNC: 101 MMOL/L (ref 98–107)
CLARITY UR: CLEAR
CO2 SERPL-SCNC: 23 MMOL/L (ref 22–29)
COLOR UR: YELLOW
CREAT BLD-MCNC: 0.75 MG/DL (ref 0.57–1)
D-LACTATE SERPL-SCNC: 0.6 MMOL/L (ref 0.5–2)
DEPRECATED RDW RBC AUTO: 43.7 FL (ref 37–54)
EOSINOPHIL # BLD MANUAL: 0.06 10*3/MM3 (ref 0–0.4)
EOSINOPHIL NFR BLD MANUAL: 1 % (ref 0.3–6.2)
ERYTHROCYTE [DISTWIDTH] IN BLOOD BY AUTOMATED COUNT: 13.2 % (ref 12.3–15.4)
GFR SERPL CREATININE-BSD FRML MDRD: 86 ML/MIN/1.73
GLOBULIN UR ELPH-MCNC: 3.5 GM/DL
GLUCOSE BLD-MCNC: 102 MG/DL (ref 65–99)
GLUCOSE UR STRIP-MCNC: NEGATIVE MG/DL
HCT VFR BLD AUTO: 35 % (ref 34–46.6)
HGB BLD-MCNC: 11 G/DL (ref 12–15.9)
HGB UR QL STRIP.AUTO: ABNORMAL
HYALINE CASTS UR QL AUTO: ABNORMAL /LPF
KETONES UR QL STRIP: ABNORMAL
LEUKOCYTE ESTERASE UR QL STRIP.AUTO: NEGATIVE
LIPASE SERPL-CCNC: 8 U/L (ref 13–60)
LYMPHOCYTES # BLD MANUAL: 0.77 10*3/MM3 (ref 0.7–3.1)
LYMPHOCYTES NFR BLD MANUAL: 12 % (ref 19.6–45.3)
LYMPHOCYTES NFR BLD MANUAL: 16 % (ref 5–12)
MCH RBC QN AUTO: 28.3 PG (ref 26.6–33)
MCHC RBC AUTO-ENTMCNC: 31.4 G/DL (ref 31.5–35.7)
MCV RBC AUTO: 90 FL (ref 79–97)
MONOCYTES # BLD AUTO: 1.03 10*3/MM3 (ref 0.1–0.9)
NEUTROPHILS # BLD AUTO: 4.57 10*3/MM3 (ref 1.7–7)
NEUTROPHILS NFR BLD MANUAL: 65 % (ref 42.7–76)
NEUTS BAND NFR BLD MANUAL: 6 % (ref 0–5)
NITRITE UR QL STRIP: NEGATIVE
PH UR STRIP.AUTO: 5.5 [PH] (ref 5–8)
PLAT MORPH BLD: NORMAL
PLATELET # BLD AUTO: 500 10*3/MM3 (ref 140–450)
PMV BLD AUTO: 8.9 FL (ref 6–12)
POTASSIUM BLD-SCNC: 4 MMOL/L (ref 3.5–5.2)
PROCALCITONIN SERPL-MCNC: 0.13 NG/ML (ref 0.1–0.25)
PROT SERPL-MCNC: 6.8 G/DL (ref 6–8.5)
PROT UR QL STRIP: NEGATIVE
RBC # BLD AUTO: 3.89 10*6/MM3 (ref 3.77–5.28)
RBC # UR: ABNORMAL /HPF
RBC MORPH BLD: NORMAL
REF LAB TEST METHOD: ABNORMAL
RH BLD: NEGATIVE
SODIUM BLD-SCNC: 137 MMOL/L (ref 136–145)
SP GR UR STRIP: >=1.099 (ref 1–1.03)
SQUAMOUS #/AREA URNS HPF: ABNORMAL /HPF
T&S EXPIRATION DATE: NORMAL
UROBILINOGEN UR QL STRIP: ABNORMAL
WBC MORPH BLD: NORMAL
WBC NRBC COR # BLD: 6.43 10*3/MM3 (ref 3.4–10.8)
WBC UR QL AUTO: ABNORMAL /HPF

## 2019-09-18 PROCEDURE — 84145 PROCALCITONIN (PCT): CPT | Performed by: HOSPITALIST

## 2019-09-18 PROCEDURE — 99223 1ST HOSP IP/OBS HIGH 75: CPT | Performed by: HOSPITALIST

## 2019-09-18 PROCEDURE — 81001 URINALYSIS AUTO W/SCOPE: CPT | Performed by: HOSPITALIST

## 2019-09-18 PROCEDURE — 87040 BLOOD CULTURE FOR BACTERIA: CPT | Performed by: HOSPITALIST

## 2019-09-18 PROCEDURE — 74178 CT ABD&PLV WO CNTR FLWD CNTR: CPT

## 2019-09-18 PROCEDURE — 71045 X-RAY EXAM CHEST 1 VIEW: CPT

## 2019-09-18 PROCEDURE — 25010000002 MORPHINE PER 10 MG: Performed by: HOSPITALIST

## 2019-09-18 PROCEDURE — 83605 ASSAY OF LACTIC ACID: CPT | Performed by: HOSPITALIST

## 2019-09-18 PROCEDURE — 86850 RBC ANTIBODY SCREEN: CPT | Performed by: HOSPITALIST

## 2019-09-18 PROCEDURE — 85007 BL SMEAR W/DIFF WBC COUNT: CPT | Performed by: HOSPITALIST

## 2019-09-18 PROCEDURE — 86900 BLOOD TYPING SEROLOGIC ABO: CPT | Performed by: HOSPITALIST

## 2019-09-18 PROCEDURE — 25010000002 METHYLPREDNISOLONE PER 125 MG: Performed by: HOSPITALIST

## 2019-09-18 PROCEDURE — 80053 COMPREHEN METABOLIC PANEL: CPT | Performed by: HOSPITALIST

## 2019-09-18 PROCEDURE — 83690 ASSAY OF LIPASE: CPT | Performed by: HOSPITALIST

## 2019-09-18 PROCEDURE — 86901 BLOOD TYPING SEROLOGIC RH(D): CPT | Performed by: HOSPITALIST

## 2019-09-18 PROCEDURE — 25010000002 IOPAMIDOL 61 % SOLUTION: Performed by: HOSPITALIST

## 2019-09-18 PROCEDURE — 85025 COMPLETE CBC W/AUTO DIFF WBC: CPT | Performed by: HOSPITALIST

## 2019-09-18 PROCEDURE — 0097U HC BIOFIRE FILMARRAY GI PANEL: CPT | Performed by: HOSPITALIST

## 2019-09-18 RX ORDER — ALUMINA, MAGNESIA, AND SIMETHICONE 2400; 2400; 240 MG/30ML; MG/30ML; MG/30ML
15 SUSPENSION ORAL EVERY 6 HOURS PRN
Status: DISCONTINUED | OUTPATIENT
Start: 2019-09-18 | End: 2019-09-20 | Stop reason: HOSPADM

## 2019-09-18 RX ORDER — NALOXONE HCL 0.4 MG/ML
0.4 VIAL (ML) INJECTION
Status: DISCONTINUED | OUTPATIENT
Start: 2019-09-18 | End: 2019-09-20 | Stop reason: HOSPADM

## 2019-09-18 RX ORDER — AZATHIOPRINE 50 MG/1
50 TABLET ORAL DAILY
Status: ON HOLD | COMMUNITY
End: 2019-09-20 | Stop reason: SDUPTHER

## 2019-09-18 RX ORDER — DIAZEPAM 5 MG/1
5 TABLET ORAL NIGHTLY PRN
Status: DISCONTINUED | OUTPATIENT
Start: 2019-09-18 | End: 2019-09-20 | Stop reason: HOSPADM

## 2019-09-18 RX ORDER — FERROUS SULFATE TAB EC 324 MG (65 MG FE EQUIVALENT) 324 (65 FE) MG
324 TABLET DELAYED RESPONSE ORAL
Status: ON HOLD | COMMUNITY
End: 2019-09-23

## 2019-09-18 RX ORDER — FERROUS SULFATE 325(65) MG
325 TABLET ORAL
Status: DISCONTINUED | OUTPATIENT
Start: 2019-09-18 | End: 2019-09-18

## 2019-09-18 RX ORDER — ACETAMINOPHEN 325 MG/1
650 TABLET ORAL EVERY 4 HOURS PRN
Status: DISCONTINUED | OUTPATIENT
Start: 2019-09-18 | End: 2019-09-20 | Stop reason: HOSPADM

## 2019-09-18 RX ORDER — ONDANSETRON 4 MG/1
4 TABLET, FILM COATED ORAL EVERY 6 HOURS PRN
Status: DISCONTINUED | OUTPATIENT
Start: 2019-09-18 | End: 2019-09-20 | Stop reason: HOSPADM

## 2019-09-18 RX ORDER — DIAZEPAM 5 MG/1
2.5 TABLET ORAL NIGHTLY PRN
Status: ON HOLD | COMMUNITY
End: 2019-09-23

## 2019-09-18 RX ORDER — HYDROCODONE BITARTRATE AND ACETAMINOPHEN 5; 325 MG/1; MG/1
1 TABLET ORAL EVERY 4 HOURS PRN
Status: DISCONTINUED | OUTPATIENT
Start: 2019-09-18 | End: 2019-09-20

## 2019-09-18 RX ORDER — DIAZEPAM 5 MG/1
2.5 TABLET ORAL NIGHTLY PRN
Status: DISCONTINUED | OUTPATIENT
Start: 2019-09-18 | End: 2019-09-18

## 2019-09-18 RX ORDER — ONDANSETRON 2 MG/ML
4 INJECTION INTRAMUSCULAR; INTRAVENOUS EVERY 6 HOURS PRN
Status: DISCONTINUED | OUTPATIENT
Start: 2019-09-18 | End: 2019-09-20 | Stop reason: HOSPADM

## 2019-09-18 RX ORDER — ACETAMINOPHEN 650 MG/1
650 SUPPOSITORY RECTAL EVERY 4 HOURS PRN
Status: DISCONTINUED | OUTPATIENT
Start: 2019-09-18 | End: 2019-09-20 | Stop reason: HOSPADM

## 2019-09-18 RX ORDER — SODIUM CHLORIDE 9 MG/ML
100 INJECTION, SOLUTION INTRAVENOUS CONTINUOUS
Status: DISCONTINUED | OUTPATIENT
Start: 2019-09-18 | End: 2019-09-20 | Stop reason: HOSPADM

## 2019-09-18 RX ORDER — MORPHINE SULFATE 2 MG/ML
2 INJECTION, SOLUTION INTRAMUSCULAR; INTRAVENOUS EVERY 4 HOURS PRN
Status: DISCONTINUED | OUTPATIENT
Start: 2019-09-18 | End: 2019-09-20 | Stop reason: HOSPADM

## 2019-09-18 RX ORDER — SODIUM CHLORIDE 0.9 % (FLUSH) 0.9 %
10 SYRINGE (ML) INJECTION EVERY 12 HOURS SCHEDULED
Status: DISCONTINUED | OUTPATIENT
Start: 2019-09-18 | End: 2019-09-20 | Stop reason: HOSPADM

## 2019-09-18 RX ORDER — SODIUM CHLORIDE 0.9 % (FLUSH) 0.9 %
10 SYRINGE (ML) INJECTION AS NEEDED
Status: DISCONTINUED | OUTPATIENT
Start: 2019-09-18 | End: 2019-09-20 | Stop reason: HOSPADM

## 2019-09-18 RX ORDER — MESALAMINE 1.2 G/1
1200 TABLET, DELAYED RELEASE ORAL
Status: ON HOLD | COMMUNITY
End: 2019-09-23

## 2019-09-18 RX ORDER — PREDNISONE 1 MG/1
5 TABLET ORAL DAILY
Status: ON HOLD | COMMUNITY
End: 2019-09-20 | Stop reason: SDUPTHER

## 2019-09-18 RX ORDER — METHYLPREDNISOLONE SODIUM SUCCINATE 125 MG/2ML
60 INJECTION, POWDER, LYOPHILIZED, FOR SOLUTION INTRAMUSCULAR; INTRAVENOUS EVERY 24 HOURS
Status: DISCONTINUED | OUTPATIENT
Start: 2019-09-18 | End: 2019-09-20

## 2019-09-18 RX ORDER — ACETAMINOPHEN 160 MG/5ML
650 SOLUTION ORAL EVERY 4 HOURS PRN
Status: DISCONTINUED | OUTPATIENT
Start: 2019-09-18 | End: 2019-09-20 | Stop reason: HOSPADM

## 2019-09-18 RX ADMIN — HYDROCODONE BITARTRATE AND ACETAMINOPHEN 1 TABLET: 5; 325 TABLET ORAL at 11:07

## 2019-09-18 RX ADMIN — MORPHINE SULFATE 2 MG: 2 INJECTION, SOLUTION INTRAMUSCULAR; INTRAVENOUS at 15:37

## 2019-09-18 RX ADMIN — MORPHINE SULFATE 2 MG: 2 INJECTION, SOLUTION INTRAMUSCULAR; INTRAVENOUS at 19:46

## 2019-09-18 RX ADMIN — HYDROCODONE BITARTRATE AND ACETAMINOPHEN 1 TABLET: 5; 325 TABLET ORAL at 23:25

## 2019-09-18 RX ADMIN — SODIUM CHLORIDE 100 ML/HR: 9 INJECTION, SOLUTION INTRAVENOUS at 21:43

## 2019-09-18 RX ADMIN — SODIUM CHLORIDE, PRESERVATIVE FREE 10 ML: 5 INJECTION INTRAVENOUS at 19:47

## 2019-09-18 RX ADMIN — SODIUM CHLORIDE 100 ML/HR: 9 INJECTION, SOLUTION INTRAVENOUS at 12:16

## 2019-09-18 RX ADMIN — IOPAMIDOL 95 ML: 612 INJECTION, SOLUTION INTRAVENOUS at 12:30

## 2019-09-18 RX ADMIN — SODIUM CHLORIDE, PRESERVATIVE FREE 10 ML: 5 INJECTION INTRAVENOUS at 12:17

## 2019-09-18 RX ADMIN — METHYLPREDNISOLONE SODIUM SUCCINATE 60 MG: 125 INJECTION, POWDER, FOR SOLUTION INTRAMUSCULAR; INTRAVENOUS at 18:14

## 2019-09-18 NOTE — PLAN OF CARE
Problem: Patient Care Overview  Goal: Plan of Care Review  Outcome: Ongoing (interventions implemented as appropriate)    Goal: Individualization and Mutuality  Outcome: Ongoing (interventions implemented as appropriate)    Goal: Discharge Needs Assessment  Outcome: Ongoing (interventions implemented as appropriate)    Goal: Interprofessional Rounds/Family Conf  Outcome: Ongoing (interventions implemented as appropriate)      Problem: Pain, Chronic (Adult)  Goal: Identify Related Risk Factors and Signs and Symptoms  Outcome: Ongoing (interventions implemented as appropriate)    Goal: Acceptable Pain/Comfort Level and Functional Ability  Outcome: Ongoing (interventions implemented as appropriate)      Problem: Bowel Disease, Inflammatory (Adult)  Goal: Signs and Symptoms of Listed Potential Problems Will be Absent, Minimized or Managed (Bowel Disease, Inflammatory)  Outcome: Ongoing (interventions implemented as appropriate)

## 2019-09-18 NOTE — CONSULTS
Colon and Rectal [[facility]]    Patient Care Team:  Lori Mcmahon MD as PCP - General (Family Medicine)    Chief complaint: Colitis flare    Subjective     HPI: 39-year-old white female who was diagnosed at about age 19 with her colitis.  She did well on Remicade for a decade and then she stopped the medication.  About 2 years ago she had toxic colitis and we ended up peeling the transverse colon off the retroperitoneum but we are able to hook her together with a ileal sigmoid anastomosis.  A postop colonoscopy in March 2018 showed normal anatomy and no colitis and then one earlier this year showed marked inflammation of the rectum.  She is been on Entocort and steroids and most recently Entyvio.  She is been having 20 stools a day been getting weak with nausea and decreased urine output.  She has had fevers 201 so she has been admitted to make sure she is not septic.  Considerations for an ileostomy and certainly a change in medications.    Review of Systems: Mainly fatigue and weakness will otherwise 10 point review is unremarkable.     History  Past Medical History:   Diagnosis Date   • Crohn disease (CMS/HCC)      Past Surgical History:   Procedure Laterality Date   • COLON RESECTION N/A 3/26/2018    Procedure: SUBTOTAL COLECTOMY WITH DEBRIEDMENT OF RETROPERITONEAL ABCESS, OMENTECTOMY;  Surgeon: Mahin Abeeb MD;  Location:  [facility];  Service: General   • COLONOSCOPY N/A 3/21/2018    Procedure: COLONOSCOPY;  Surgeon: Mahin Abebe MD;  Location:  [facility];  Service: General     Family History   Problem Relation Age of Onset   • No Known Problems Mother    • No Known Problems Father    • GI problems Brother    • No Known Problems Maternal Grandmother    • No Known Problems Maternal Grandfather    • No Known Problems Paternal Grandmother    • No Known Problems Paternal Grandfather      Social History     Tobacco Use   • Smoking status: Never Smoker   • Smokeless tobacco: Never Used   Substance Use  "Topics   • Alcohol use: No   • Drug use: No     Medications Prior to Admission   Medication Sig Dispense Refill Last Dose   • azaTHIOprine (IMURAN) 50 MG tablet Take 50 mg by mouth Daily.      • diazePAM (VALIUM) 5 MG tablet Take 2.5 mg by mouth At Night As Needed for Anxiety (for sleep).      • ferrous sulfate 324 (65 Fe) MG tablet delayed-release EC tablet Take 324 mg by mouth Daily With Breakfast.      • mesalamine (LIALDA) 1.2 g EC tablet Take 1,200 mg by mouth Daily With Breakfast. 1/2 tablet twice daily.      • predniSONE (DELTASONE) 5 MG tablet Take 5 mg by mouth Daily.      • Vedolizumab (ENTYVIO IV) Infuse  into a venous catheter. Given every 8 week      • Cyanocobalamin (B-12) 1000 MCG/ML kit Inject 1,000 mcg as directed 1 (One) Time Per Week. On Tuesdays   Past Week at Unknown time   • levocetirizine (XYZAL) 5 MG tablet Take 5 mg by mouth As Needed.   Past Week at Unknown time     Allergies:  Patient has no known allergies.    Objective     Vital Signs  Blood pressure 112/68, pulse 60, temperature 98 °F (36.7 °C), temperature source Oral, resp. rate 16, height 172.7 cm (68\"), weight 68.9 kg (151 lb 12.8 oz), last menstrual period 08/27/2019, SpO2 96 %.    Physical Exam: Supine and surprisingly not in any acute distress.  HEENT normal  Neck supple  Lungs clear  Heart regular without murmur  Abdomen with a well-healed lower midline scar.  A small umbilical hernia.  Mild diffuse tenderness mainly left lower abdomen no peritoneal signs or masses but then she really did have a colon  Anal exam deferred since I just saw her in the office  Extremities warm good pulses no edema  Musculoskeletal neurologic grossly within normal limits     Results Review:  Lab Results (last 24 hours)     Procedure Component Value Units Date/Time    Comprehensive Metabolic Panel [[id number]] Collected:  09/18/19 1342    Specimen:  Blood Updated:  09/18/19 1454    Lipase [[id number]] Collected:  09/18/19 1342    Specimen:  Blood " Updated:  09/18/19 1454    Procalcitonin [[id number]] Collected:  09/18/19 1342    Specimen:  Blood Updated:  09/18/19 1454    CBC Auto Differential [[id number]] Collected:  09/18/19 1342    Specimen:  Blood Updated:  09/18/19 1454    Lactic Acid, Plasma [[id number]] Collected:  09/18/19 1342    Specimen:  Blood Updated:  09/18/19 1454         Imaging Results (last 24 hours)     Procedure Component Value Units Date/Time    CT Abdomen Pelvis With & Without Contrast [[id number]] Collected:  09/18/19 1217     Updated:  09/18/19 1228    Narrative:       EXAMINATION: CT ABDOMEN PELVIS W WO CONTRAST-      INDICATION: fever, abd pain, diarrhea in Crohn's patient s/p subtotal  colectomy     TECHNIQUE: 5 mm unenhanced images and subsequent post-IV contrast portal  venous phase and delayed venous phase 5 mm through the abdomen and  pelvis     The radiation dose reduction device was turned on for each scan per the  ALARA (As Low as Reasonably Achievable) protocol.     COMPARISON: 03/19/2018 and post CT scan     FINDINGS: History indicates previous history of Crohn's disease,  currently with fever abdominal pain and diarrhea. History of previous  subtotal colectomy.     Included lung bases appear clear. No significant abnormalities are seen  of the liver, spleen, pancreas, gallbladder, general glands, kidneys. No  upper abdominal free air or ascites and aftercare inflammatory change is  seen.     Regarding the lower abdomen and pelvis, a small knuckle of  normal-appearing bowel,     The small bowel is seen in a paraumbilical hernia with no evidence of  stranding of a short obstruction. There is an anastomotic site in the  mid pelvis, presumably a small bowel descending colon anastomosis. All  of the foot appears to be the remaining colon appears significantly  thick walled, with mild surrounding inflammatory change, as can be  appreciated from axial image #63 through 77. There is no evidence of  pneumatosis, no evidence of abscess  or free fluid. There is a slightly  heterogeneous appearance of the central uterus of questionable  significance, possibly just due to phase of menstrual cycle.. Left ovary  is relatively high in position and appears to contain an enhancing 2 cm  cyst. Right ovary is tentatively identified and grossly normal.             Impression:       1. Patient appears to have a small bowel-distal colon anastomosis. There  is diffuse inflammation of all of what appears to be the remaining  distal colon.  2. No visible small bowel inflammation.  3. Small bowel containing umbilical hernia with no evidence of  strangulation or obstruction  4. 2 cm enhancing left ovarian cyst.  5. Heterogeneous appearance of the uterus, specifically the endometrium,  possibly just due to phase of patient's menstrual cycle.          XR Chest 1 View [[id number]] Collected:  09/18/19 1103     Updated:  09/18/19 1107    Narrative:          EXAMINATION: XR CHEST 1 VW-      INDICATION: fever      COMPARISON: NONE     FINDINGS: History indicates fever for a couple of days, no current chest  complaints. The heart mediastinum and pulmonary vasculature appear  within normal limits. Lungs appear well-expanded and grossly clear.  There is a suggestion of mild peribronchial thickening which could be  either acute or chronic. No effusion or pneumothorax is seen.           Impression:       Mild peribronchial thickening which may be either acute or  chronic. With history of fever, consider mild bronchitis. No other  evidence of active chest disease.     This report was finalized on 9/18/2019 11:04 AM by DR. Toribio Adam MD.              Assessment/Plan       Exacerbation of Crohn's disease (CMS/HCC)    Immunocompromised (CMS/HCC)    Abdominal pain    Fever    Diarrhea    Weight loss  History of toxic colitis 2017 requiring a subtotal colectomy.    Recommendation: Make sure she is not septic and hydrate her well.  Steroids.  Entyvio does not seem to be working.  She  has a history of Remicade working but insurances are difficult to get this approved.  I will have our office work on that.  Discussed with hospitalist.    I discussed the patients findings and my recommendations with patient and family.     Mahin Abebe MD  09/18/19  2:55 PM

## 2019-09-18 NOTE — H&P
[facility] Medicine Services  HISTORY AND PHYSICAL    Patient Name: Sultana Siegel  : 1980  MRN: [id number]  Primary Care Physician: Lori Mcmahon MD  Date of admission: 2019      Subjective   Subjective     Chief Complaint:  Fever, abdominal pain, diarrhea    HPI:  Sultana Siegel is a 39 y.o. female with hx of Crohn's diagnosed at age 19, s/p subtotal colectomy and debridement of intraabdominal abscess in 2018, and anemia presents from home due to progressively worsening symptoms of diffuse abdominal pain, diarrhea (20+ BM's per day, some with blood), fevers up to 101, and nausea without vomiting. Patient states she had done well for about 9 months following her surgery last spring, however over the past 6 months she has had recurrent/persistent flare-ups. She has had 4 rounds of Entyvio and also takes Imuran. She was started on a prednisone taper in late 2019 and is currently on 5 mg prednisone daily. Patient also reports about 1 month ago she was treated with Flagyl and Xifaxan, with no relief. She has lost about 8 lbs in the past 2 months. Due to failure of outpatient treatment and concern for infection given her immunocompromised status, Dr. Abebe arranged for patient to be directly admitted to the hospital today.    Review of Systems   Gen-+fevers, no chills  CV-no chest pain, no palpitations  Resp-no cough, no dyspnea  GI-+nausea, +diarrhea, no vomiting, +abd pain    All other systems reviewed and negative except any additional pertinent positives and negatives as discussed in HPI.    Personal History     Past Medical History:   Diagnosis Date   • Crohn disease (CMS/HCC)        Past Surgical History:   Procedure Laterality Date   • COLON RESECTION N/A 3/26/2018    Procedure: SUBTOTAL COLECTOMY WITH DEBRIEDMENT OF RETROPERITONEAL ABCESS, OMENTECTOMY;  Surgeon: Mahin Abebe MD;  Location: [facility];  Service: General   • COLONOSCOPY N/A 3/21/2018     Procedure: COLONOSCOPY;  Surgeon: Mahin Abebe MD;  Location: [facility];  Service: General       Family History: family history includes GI problems in her brother; No Known Problems in her father, maternal grandfather, maternal grandmother, mother, paternal grandfather, and paternal grandmother. Otherwise pertinent FHx was reviewed and unremarkable.     Social History:  reports that she has never smoked. She has never used smokeless tobacco. She reports that she does not drink alcohol or use drugs.  Social History     Social History Narrative   • Not on file       Medications:    Available home medication information reviewed.  Medications Prior to Admission   Medication Sig Dispense Refill Last Dose   • azaTHIOprine (IMURAN) 50 MG tablet Take 50 mg by mouth Daily.      • diazePAM (VALIUM) 5 MG tablet Take 2.5 mg by mouth At Night As Needed for Anxiety (for sleep).      • ferrous sulfate 324 (65 Fe) MG tablet delayed-release EC tablet Take 324 mg by mouth Daily With Breakfast.      • mesalamine (LIALDA) 1.2 g EC tablet Take 1,200 mg by mouth Daily With Breakfast. 1/2 tablet twice daily.      • predniSONE (DELTASONE) 5 MG tablet Take 5 mg by mouth Daily.      • Vedolizumab (ENTYVIO IV) Infuse  into a venous catheter. Given every 8 week      • Cyanocobalamin (B-12) 1000 MCG/ML kit Inject 1,000 mcg as directed 1 (One) Time Per Week. On Tuesdays   Past Week at Unknown time   • levocetirizine (XYZAL) 5 MG tablet Take 5 mg by mouth As Needed.   Past Week at Unknown time       No Known Allergies    Objective   Objective     Vital Signs:   Temp:  [98.1 °F (36.7 °C)] 98.1 °F (36.7 °C)  Heart Rate:  [71] 71  Resp:  [16] 16  BP: (109-120)/(69-75) 120/75        Physical Exam   Constitutional: Awake, alert  Eyes: PERRLA, sclerae anicteric, no conjunctival injection  HENT: NCAT, mucous membranes slightly dry  Neck: Supple, no thyromegaly, no lymphadenopathy, trachea midline  Respiratory: Clear to auscultation  bilaterally, nonlabored respirations   Cardiovascular: RRR, no murmurs, rubs, or gallops, palpable pedal pulses bilaterally  Gastrointestinal: Positive bowel sounds, soft, moderately TTP diffusely, nondistended  Musculoskeletal: No bilateral ankle edema, no clubbing or cyanosis to extremities  Psychiatric: Appropriate affect, cooperative  Neurologic: Oriented x 3, strength symmetric in all extremities, Cranial Nerves grossly intact to confrontation, speech clear  Skin: No rashes      Results Reviewed:  I have personally reviewed current lab and radiology data.              Invalid input(s):  ALKPHOS  CrCl cannot be calculated (Patient's most recent lab result is older than the maximum 30 days allowed.).  Brief Urine Lab Results     None        Imaging Results (last 24 hours)     ** No results found for the last 24 hours. **             Assessment/Plan   Assessment / Plan     Active Hospital Problems    Diagnosis POA   • **Exacerbation of Crohn's disease (CMS/HCC) [K50.90] Yes   • Abdominal pain [R10.9] Yes   • Fever [R50.9] Yes   • Diarrhea [R19.7] Yes   • Weight loss [R63.4] Yes   • Immunocompromised (CMS/HCC) [D84.9] Yes       Ms. Siegel is a 40 yo F with hx of Crohn's disease s/p subtotal colectomy and debridement of intraabdominal abscess in March 2018 who presents from home due to progressively worsening symptoms of diffuse abdominal pain, diarrhea, fevers up to 101, and nausea without vomiting.    PLAN:  --STAT labs including blood cultures. Will check stool diatherix panel.  --Start IV fluids. Supportive care with pain and nausea meds.  --STAT CT A/P with and without contrast. Hold on IV steroids until we rule out infection.  --Consult to ID and Dr. Abebe.    DVT prophylaxis:  mechanical    CODE STATUS:    Code Status and Medical Interventions:   Ordered at: 09/18/19 1009     Code Status:    CPR     Medical Interventions (Level of Support Prior to Arrest):    Full       Admission Status:  I believe this  patient meets INPATIENT status due to Crohn's exacerbation with fevers in an immunocompromised patient, failure of outpatient treatment.  I feel patient’s risk for adverse outcomes and need for care warrant INPATIENT evaluation and I predict the patient’s care encounter to likely last beyond 2 midnights.      Electronically signed by Sarina Lima MD, 09/18/19, 10:33 AM

## 2019-09-19 LAB
ADV 40+41 DNA STL QL NAA+NON-PROBE: NOT DETECTED
ANION GAP SERPL CALCULATED.3IONS-SCNC: 11 MMOL/L (ref 5–15)
ASTRO TYP 1-8 RNA STL QL NAA+NON-PROBE: NOT DETECTED
BUN BLD-MCNC: 5 MG/DL (ref 6–20)
BUN/CREAT SERPL: 7.6 (ref 7–25)
C CAYETANENSIS DNA STL QL NAA+NON-PROBE: NOT DETECTED
CALCIUM SPEC-SCNC: 8.5 MG/DL (ref 8.6–10.5)
CAMPY SP DNA.DIARRHEA STL QL NAA+PROBE: NOT DETECTED
CHLORIDE SERPL-SCNC: 102 MMOL/L (ref 98–107)
CO2 SERPL-SCNC: 23 MMOL/L (ref 22–29)
CREAT BLD-MCNC: 0.66 MG/DL (ref 0.57–1)
CRYPTOSP STL CULT: NOT DETECTED
DEPRECATED RDW RBC AUTO: 44.1 FL (ref 37–54)
E COLI DNA SPEC QL NAA+PROBE: NOT DETECTED
E HISTOLYT AG STL-ACNC: NOT DETECTED
EAEC PAA PLAS AGGR+AATA ST NAA+NON-PRB: NOT DETECTED
EC STX1 + STX2 GENES STL NAA+PROBE: NOT DETECTED
EPEC EAE GENE STL QL NAA+NON-PROBE: NOT DETECTED
ERYTHROCYTE [DISTWIDTH] IN BLOOD BY AUTOMATED COUNT: 13.2 % (ref 12.3–15.4)
ETEC LTA+ST1A+ST1B TOX ST NAA+NON-PROBE: NOT DETECTED
G LAMBLIA DNA SPEC QL NAA+PROBE: NOT DETECTED
GFR SERPL CREATININE-BSD FRML MDRD: 99 ML/MIN/1.73
GLUCOSE BLD-MCNC: 164 MG/DL (ref 65–99)
HCT VFR BLD AUTO: 34.7 % (ref 34–46.6)
HGB BLD-MCNC: 10.9 G/DL (ref 12–15.9)
MCH RBC QN AUTO: 28.6 PG (ref 26.6–33)
MCHC RBC AUTO-ENTMCNC: 31.4 G/DL (ref 31.5–35.7)
MCV RBC AUTO: 91.1 FL (ref 79–97)
NOROVIRUS GI+II RNA STL QL NAA+NON-PROBE: NOT DETECTED
P SHIGELLOIDES DNA STL QL NAA+PROBE: NOT DETECTED
PLATELET # BLD AUTO: 490 10*3/MM3 (ref 140–450)
PMV BLD AUTO: 9 FL (ref 6–12)
POTASSIUM BLD-SCNC: 4.3 MMOL/L (ref 3.5–5.2)
RBC # BLD AUTO: 3.81 10*6/MM3 (ref 3.77–5.28)
RV RNA STL NAA+PROBE: NOT DETECTED
SALMONELLA DNA SPEC QL NAA+PROBE: NOT DETECTED
SAPO I+II+IV+V RNA STL QL NAA+NON-PROBE: NOT DETECTED
SHIGELLA SP+EIEC IPAH STL QL NAA+PROBE: NOT DETECTED
SODIUM BLD-SCNC: 136 MMOL/L (ref 136–145)
V CHOLERAE DNA SPEC QL NAA+PROBE: NOT DETECTED
VIBRIO DNA SPEC NAA+PROBE: NOT DETECTED
WBC NRBC COR # BLD: 6.51 10*3/MM3 (ref 3.4–10.8)
YERSINIA STL CULT: NOT DETECTED

## 2019-09-19 PROCEDURE — 25010000002 MORPHINE PER 10 MG: Performed by: HOSPITALIST

## 2019-09-19 PROCEDURE — 85027 COMPLETE CBC AUTOMATED: CPT | Performed by: HOSPITALIST

## 2019-09-19 PROCEDURE — 99233 SBSQ HOSP IP/OBS HIGH 50: CPT | Performed by: HOSPITALIST

## 2019-09-19 PROCEDURE — 80048 BASIC METABOLIC PNL TOTAL CA: CPT | Performed by: HOSPITALIST

## 2019-09-19 PROCEDURE — 25010000002 METHYLPREDNISOLONE PER 125 MG: Performed by: HOSPITALIST

## 2019-09-19 RX ADMIN — SODIUM CHLORIDE, PRESERVATIVE FREE 10 ML: 5 INJECTION INTRAVENOUS at 09:22

## 2019-09-19 RX ADMIN — MORPHINE SULFATE 2 MG: 2 INJECTION, SOLUTION INTRAMUSCULAR; INTRAVENOUS at 09:22

## 2019-09-19 RX ADMIN — SODIUM CHLORIDE 100 ML/HR: 9 INJECTION, SOLUTION INTRAVENOUS at 07:25

## 2019-09-19 RX ADMIN — MORPHINE SULFATE 2 MG: 2 INJECTION, SOLUTION INTRAMUSCULAR; INTRAVENOUS at 13:42

## 2019-09-19 RX ADMIN — METHYLPREDNISOLONE SODIUM SUCCINATE 60 MG: 125 INJECTION, POWDER, FOR SOLUTION INTRAMUSCULAR; INTRAVENOUS at 16:46

## 2019-09-19 RX ADMIN — SODIUM CHLORIDE 100 ML/HR: 9 INJECTION, SOLUTION INTRAVENOUS at 16:46

## 2019-09-19 RX ADMIN — MORPHINE SULFATE 2 MG: 2 INJECTION, SOLUTION INTRAMUSCULAR; INTRAVENOUS at 22:54

## 2019-09-19 RX ADMIN — MORPHINE SULFATE 2 MG: 2 INJECTION, SOLUTION INTRAMUSCULAR; INTRAVENOUS at 04:48

## 2019-09-19 RX ADMIN — MORPHINE SULFATE 2 MG: 2 INJECTION, SOLUTION INTRAMUSCULAR; INTRAVENOUS at 18:38

## 2019-09-19 NOTE — PROGRESS NOTES
[facility] Medicine Services  PROGRESS NOTE    Patient Name: Sultana Siegel  : 1980  MRN: [id number]    Date of Admission: 2019  Primary Care Physician: Lori Mcmahon MD    Subjective   Subjective     CC:  F/U abd pain, diarrhea    HPI:  Patient seen this morning. Tolerating clear liquids. Pain is controlled. Has been having diarrhea almost every hour.     Review of Systems  Gen-no fevers, no chills  CV-no chest pain, no palpitations  Resp-no cough, no dyspnea  GI-no N/V, +diarrhea, +abd pain    All other systems reviewed and negative except any additional pertinent positives and negatives as discussed in HPI.    Objective   Objective     Vital Signs:   Temp:  [97.7 °F (36.5 °C)-99.7 °F (37.6 °C)] 98.1 °F (36.7 °C)  Heart Rate:  [64-96] 71  Resp:  [15-18] 15  BP: ()/(55-84) 126/74        Physical Exam:  Gen-no acute distress  HENT-NCAT, mucous membranes moist  CV-RRR, S1 S2 normal, no m/r/g  Resp-CTAB, no wheezes or rales  Abd-soft, mild diffuse TTP, ND, +BS  Ext-no edema  Neuro-A&Ox3, no focal deficits  Skin-no rashes  Psych-appropriate mood      Results Reviewed:    Results from last 7 days   Lab Units 19  0443 19  1342   WBC 10*3/mm3 6.51 6.43   HEMOGLOBIN g/dL 10.9* 11.0*   HEMATOCRIT % 34.7 35.0   PLATELETS 10*3/mm3 490* 500*   PROCALCITONIN ng/mL  --  0.13     Results from last 7 days   Lab Units 19  0443 19  1342   SODIUM mmol/L 136 137   POTASSIUM mmol/L 4.3 4.0   CHLORIDE mmol/L 102 101   CO2 mmol/L 23.0 23.0   BUN mg/dL 5* 6   CREATININE mg/dL 0.66 0.75   GLUCOSE mg/dL 164* 102*   CALCIUM mg/dL 8.5* 8.6   ALT (SGPT) U/L  --  5   AST (SGOT) U/L  --  7     Estimated Creatinine Clearance: 123.2 mL/min (by ESTRELLITA-G formula based on SCr of 0.66 mg/dL).    Microbiology Results Abnormal     Procedure Component Value - Date/Time    Gastrointestinal Panel, PCR - Stool, Per Rectum [[id number]]  (Normal) Collected:  19 2601    Lab Status:   Final result Specimen:  Stool from Per Rectum Updated:  09/19/19 0028     Campylobacter Not Detected     Plesiomonas shigelloides Not Detected     Salmonella Not Detected     Vibrio Not Detected     Vibrio cholerae Not Detected     Yersinia enterocolitica Not Detected     Enteroaggregative E. coli (EAEC) Not Detected     Enteropathogenic E. coli (EPEC) Not Detected     Enterotoxigenic E. coli (ETEC) lt/st Not Detected     Shiga-like toxin-producing E. coli (STEC) stx1/stx2 Not Detected     E. coli O157 Not Detected     Shigella/Enteroinvasive E. coli (EIEC) Not Detected     Cryptosporidium Not Detected     Cyclospora cayetanensis Not Detected     Entamoeba histolytica Not Detected     Giardia lamblia Not Detected     Adenovirus F40/41 Not Detected     Astrovirus Not Detected     Norovirus GI/GII Not Detected     Rotavirus A Not Detected     Sapovirus (I, II, IV or V) Not Detected    Narrative:       If Aeromonas, Staphylococcus aureus or Bacillus cereus are suspected, please order [id number]: Stool Culture, Aeromonas, S aureus, B Cereus.          Imaging Results (last 24 hours)     Procedure Component Value Units Date/Time    CT Abdomen Pelvis With & Without Contrast [[id number]] Collected:  09/18/19 1217     Updated:  09/18/19 1519    Narrative:       EXAMINATION: CT ABDOMEN/PELVIS WWO CONTRAST- - 09/18/2019     INDICATION: Fever, abdominal pain.     TECHNIQUE: 5 mm unenhanced images and subsequent post-IV contrast portal  venous phase and delayed venous phase 5 mm images through the abdomen  and pelvis.     The radiation dose reduction device was turned on for each scan per the  ALARA (As Low as Reasonably Achievable) protocol.     COMPARISON: 03/19/2018 and post CT scan     FINDINGS: History indicates previous history of Crohn's disease,  currently with fever abdominal pain and diarrhea. History of previous  subtotal colectomy.     Included lung bases appear clear. No significant abnormalities are seen  of the liver,  spleen, pancreas, gallbladder, adrenal glands, or kidneys.  No upper abdominal free air, ascites, adenopathy or inflammatory change  is seen.     Regarding the lower abdomen and pelvis, a small knuckle of  normal-appearing bowel, apparently small bowel, is seen in a  paraumbilical hernia with no evidence of strangulation or obstruction.  There is an anastomotic site in the mid pelvis, presumably a small bowel  descending colon anastomosis. All of the what appears to be the  remaining colon appears significantly thick walled, with mild  surrounding inflammatory change, as can be appreciated from axial images  #63 through #77. There is no evidence of pneumatosis, no evidence of  abscess or free fluid. There is a slightly heterogeneous appearance of  the central uterus of questionable significance, possibly just due to  phase of menstrual cycle. Left ovary is relatively high in position and  appears to contain an enhancing 2 cm cyst. Right ovary is tentatively  identified and grossly normal.       Impression:       1. Patient appears to have a small bowel-distal colon anastomosis. There  is diffuse inflammation of all of what appears to be the remaining  distal colon.  2. No visible small bowel inflammation.  3. Small bowel-containing umbilical hernia with no evidence of  strangulation or obstruction  4. 2 cm enhancing left ovarian cyst.  5. Heterogeneous appearance of the uterus, specifically the endometrium,  possibly just due to phase of patient's menstrual cycle.     DICTATED:   09/18/2019  EDITED/ls :   09/18/2019                   I have reviewed the medications:  Scheduled Meds:  methylPREDNISolone sodium succinate 60 mg Intravenous Q24H   sodium chloride 10 mL Intravenous Q12H     Continuous Infusions:  sodium chloride 100 mL/hr Last Rate: 100 mL/hr (09/19/19 0927)     PRN Meds:.•  acetaminophen **OR** acetaminophen **OR** acetaminophen  •  aluminum-magnesium hydroxide-simethicone  •  diazePAM  •   HYDROcodone-acetaminophen  •  influenza vaccine  •  Morphine **AND** naloxone  •  ondansetron **OR** ondansetron  •  sodium chloride      Assessment/Plan   Assessment / Plan     Active Hospital Problems    Diagnosis  POA   • **Exacerbation of Crohn's disease (CMS/HCC) [K50.90]  Yes   • Abdominal pain [R10.9]  Yes   • Fever [R50.9]  Yes   • Diarrhea [R19.7]  Yes   • Weight loss [R63.4]  Yes   • Immunocompromised (CMS/HCC) [D84.9]  Yes      Resolved Hospital Problems   No resolved problems to display.        Brief Hospital Course to date:  Sultana Siegel is a 39 y.o. female with hx of Crohn's disease s/p subtotal colectomy and debridement of intraabdominal abscess in March 2018 who presents from home due to progressively worsening symptoms of diffuse abdominal pain, diarrhea, fevers up to 101, and nausea without vomiting. Has been on Entyvio as well as a prednisone taper.    PLAN:  --No evidence of infection (negative stool diatherix panel), no abscess on CT A/P, just shows diffuse inflammation in the remaining distal colon. Continue IV Solumedrol. Dr. Abebe following and plans to try Remicade as outpatient.  --Continue fluids and supportive care.    DVT Prophylaxis:  mechanical    Disposition: I expect the patient to be discharged home TBD    CODE STATUS:   Code Status and Medical Interventions:   Ordered at: 09/18/19 1009     Code Status:    CPR     Medical Interventions (Level of Support Prior to Arrest):    Full         Electronically signed by Sarina Lima MD, 09/19/19, 1:37 PM.

## 2019-09-19 NOTE — CONSULTS
Clinical Nutrition   Reason For Visit: Identified at risk by screening criteria, Physician consult, Malnutrition Severity Assessment, MST score 2+, Reduced oral intake    Patient Name: Sultana Siegel  YOB: 1980  MRN: [id number]  Date of Encounter: 09/19/19 3:39 PM  Admission date: 9/18/2019      -RD recommends checking the following serum labs - vitamin B12, vitamin D, ionized calcium, CRP, and prealbumin.    -RD notes patient is receiving steroids at this time. As a result, calcium and vitamin D requirements are increased in these patients. RD recommends starting calcium and vitamin D supplement during this admission.    -RD not as concerned about fat maldigestion/absorption in this patient since CT shows inflammation in distal colon rather than ileum. Therefore, RD suspects patient's unintentional weight loss (15 lbs x 6 months) is due to a combination of decreased PO intake and increased GI transit time for food during the past 6 months.    -Patient currently reports she is not tolerating clear liquids. If patient continues with PO intolerance during the next couple of days, RD recommends initiating TPN until diarrhea and abdominal pain under control. RD suspects patient would not tolerate enteral nutrition.    -Ordered Boost Breeze/Ensure Clear 3x daily.    -Patient does not currently meet criteria for acute or chronic malnutrition at this time based on reported PO intake and weight loss amount.        Nutrition Assessment     Admission Problem List:  Nausea  Abdominal pain  Diarrhea  Fever  Immunosuppression  Crohn's exacerbation      Applicable Nutrition-Related Information:  Hx of subtotal colectomy and debridement of intra-abdominal infection abscess (3/2018)        PMH: She  has a past medical history of Crohn disease (CMS/[facility]).   PSxH: She  has a past surgical history that includes Colonoscopy (N/A, 3/21/2018) and Colectomy (N/A, 3/26/2018).        Reported/Observed/Food/Nutrition Related  History   Patient reports she was doing fairly well/stable after her operation (3/2018) up until 6 months ago when she began having on and off flare-ups of her Crohn's. Patient experiences nausea, abdominal pain, and diarrhea with the flare-ups. No emesis. She has maintained an appetite and also forced herself to eat even as much as she normally would even though she would have diarrhea. She estimates a PO intake of 75% of her usual during that time. However, during the past 3 days her symptoms have been so severe she has not been able to eat any solid food and has began drinking 2-3 Ensure Clear supplements daily at home. Last solid food intake 3 days ago. Resulting unintentional weight loss of 15 lbs x 6 months based on current wt of 151 lbs.    Patient is very concerned about her nutrition status and inquires whether or not TPN would be beneficial by allowing bowel rest. RD deferred this question to the team of physicians involved in patient's care. Patient inquires about what indicators exist to illustrate whether or not patient is deficient in certain nutrients/not digesting or absorbing nutrients. RD explained to patient that serum protein albumin is not an accurate indicator at this time due to inflammation, serum labs can be drawn for individual nutrients of concern such as vitamin B12, iron, etc. I explained to patient that it is important to consider how much food she is consuming and her weight status. Since patient reports she has lost weight during the past 6 months but has eaten nearly normal, this may indicate maldigestion/absorption. Patient reports that at home she takes supplemental vitamin B12, iron, gummy MVI, and EnteraGam. I encouraged patient to continue taking her supplements at home as prescribed and encouraged her to utilize an oral nutrition supplement drink at home.    Patient states she is currently not tolerating the clear liquid very well, experiencing abdominal pain and ongoing  diarrhea. Requests clear liquid supplements with meals at this time. Dislikes and declines full liquid supplements once PO diet advanced beyond clear liquids.    Trigger foods not discussed at this time, but patient reports that she cannot tolerate high-fiber foods including fruits, vegetables, and nuts.      Anthropometrics   Height: 68 in  Weight: 151 lbs (standing wt 9/18 per nsg doc)  BMI: 23.1  BMI classification: Normal: 18.5-24.9kg/m2   UBW: 166 lbs (6 months ago per patient)  IBW: 140 lbs      Weight change: Unintentional weight loss of 15 lbs (9.0%) x 6 months      Labs reviewed   Labs reviewed: Yes    Medications reviewed   Medications reviewed: Yes  Pertinent: steroid  GTT: NS @ 100 ml/hr  PRN: morphine    Needs Assessment   Height used: 68 in/172.7 cm  Weight used: 151 lbs/68.6 kg (actual wt)    Estimated Calories needs: 1900 calories  MSJ = 1404 x 1.3 = 1825  25-30-35 kcal/kg = [id number]    Estimated Protein needs: 89 g protein  1.0-1.5 g/kg =       Current Nutrition Prescription   PO: Diet Clear Liquid    Evaluation of Received Nutrient/Fluid Intake: insufficient data      Nutrition Diagnosis     Problem Unintended weight loss   Etiology Nausea, abdominal pain, and diarrhea in the setting of Crohn's   Signs/Symptoms Patient reports <75% of usual PO intake x 6 months and <50% of usual PO intake x 3 days; Unintentional weight loss of 15 lbs (9.0%) x 6 months       Problem Increased nutrient needs (calories, protein)   Etiology Condition associated with increased calorie and protein needs   Signs/Symptoms Crohn's exacerbation       Intervention   Intervention: Follow treatment progress, Care plan reviewed, Interview for preferences, Encourage intake, Supplement provided     -RD recommends checking the following serum labs - vitamin B12, vitamin D, ionized calcium, CRP, and prealbumin.    -RD notes patient is receiving steroids at this time. As a result, calcium and vitamin D requirements are  increased in these patients. RD recommends starting calcium and vitamin D supplement during this admission.    -RD not as concerned about fat maldigestion/absorption in this patient since CT shows inflammation in distal colon rather than ileum. Therefore, RD suspects patient's unintentional weight loss (15 lbs x 6 months) is due to a combination of decreased PO intake and increased GI transit time for food during the past 6 months.    -Patient currently reports she is not tolerating clear liquids. If patient continues with PO intolerance during the next couple of days, RD recommends initiating TPN until diarrhea and abdominal pain under control. RD suspects patient would not tolerate enteral nutrition.    -Ordered Boost Breeze/Ensure Clear 3x daily.      Goal:   General: Nutrition support treatment  PO: Tolerate PO, Increase intake, Advace diet as medically appropriate    Additional goals: No further weight loss      Monitoring/Evaluation:     Monitoring/Evaluation: Per protocol, I&O, PO intake, Supplement intake, Pertinent labs, Weight, GI status, Symptoms  Will Continue to follow per protocol  Erika Morrow RD  Time Spent: 75 min

## 2019-09-19 NOTE — PLAN OF CARE
Problem: Patient Care Overview  Goal: Plan of Care Review  Outcome: Ongoing (interventions implemented as appropriate)   09/19/19 5196   Coping/Psychosocial   Plan of Care Reviewed With patient   Plan of Care Review   Progress no change   OTHER   Outcome Summary VSS. Pain controlled. Pt slept throughout night. No further concerns will continue to monitor

## 2019-09-20 VITALS
TEMPERATURE: 97.9 F | BODY MASS INDEX: 23.01 KG/M2 | OXYGEN SATURATION: 96 % | HEART RATE: 50 BPM | RESPIRATION RATE: 18 BRPM | HEIGHT: 68 IN | WEIGHT: 151.8 LBS | DIASTOLIC BLOOD PRESSURE: 76 MMHG | SYSTOLIC BLOOD PRESSURE: 113 MMHG

## 2019-09-20 PROBLEM — R50.9 FEVER: Status: RESOLVED | Noted: 2019-09-18 | Resolved: 2019-09-20

## 2019-09-20 LAB
ANION GAP SERPL CALCULATED.3IONS-SCNC: 8 MMOL/L (ref 5–15)
BUN BLD-MCNC: 4 MG/DL (ref 6–20)
BUN/CREAT SERPL: 6.5 (ref 7–25)
CALCIUM SPEC-SCNC: 8.6 MG/DL (ref 8.6–10.5)
CHLORIDE SERPL-SCNC: 104 MMOL/L (ref 98–107)
CO2 SERPL-SCNC: 24 MMOL/L (ref 22–29)
CREAT BLD-MCNC: 0.62 MG/DL (ref 0.57–1)
DEPRECATED RDW RBC AUTO: 44.7 FL (ref 37–54)
ERYTHROCYTE [DISTWIDTH] IN BLOOD BY AUTOMATED COUNT: 13.2 % (ref 12.3–15.4)
GFR SERPL CREATININE-BSD FRML MDRD: 107 ML/MIN/1.73
GLUCOSE BLD-MCNC: 127 MG/DL (ref 65–99)
HCT VFR BLD AUTO: 33.4 % (ref 34–46.6)
HGB BLD-MCNC: 10.2 G/DL (ref 12–15.9)
MCH RBC QN AUTO: 27.9 PG (ref 26.6–33)
MCHC RBC AUTO-ENTMCNC: 30.5 G/DL (ref 31.5–35.7)
MCV RBC AUTO: 91.5 FL (ref 79–97)
PLATELET # BLD AUTO: 519 10*3/MM3 (ref 140–450)
PMV BLD AUTO: 8.9 FL (ref 6–12)
POTASSIUM BLD-SCNC: 4 MMOL/L (ref 3.5–5.2)
RBC # BLD AUTO: 3.65 10*6/MM3 (ref 3.77–5.28)
SODIUM BLD-SCNC: 136 MMOL/L (ref 136–145)
WBC NRBC COR # BLD: 6.59 10*3/MM3 (ref 3.4–10.8)

## 2019-09-20 PROCEDURE — 99239 HOSP IP/OBS DSCHRG MGMT >30: CPT | Performed by: HOSPITALIST

## 2019-09-20 PROCEDURE — 85027 COMPLETE CBC AUTOMATED: CPT | Performed by: HOSPITALIST

## 2019-09-20 PROCEDURE — 25010000002 MORPHINE PER 10 MG: Performed by: HOSPITALIST

## 2019-09-20 PROCEDURE — 25010000002 METHYLPREDNISOLONE PER 40 MG: Performed by: HOSPITALIST

## 2019-09-20 PROCEDURE — 80048 BASIC METABOLIC PNL TOTAL CA: CPT | Performed by: HOSPITALIST

## 2019-09-20 RX ORDER — AZATHIOPRINE 50 MG/1
100 TABLET ORAL DAILY
Status: ON HOLD
Start: 2019-09-20 | End: 2019-09-23

## 2019-09-20 RX ORDER — PREDNISONE 20 MG/1
40 TABLET ORAL DAILY
Qty: 10 TABLET | Refills: 0 | Status: SHIPPED | OUTPATIENT
Start: 2019-09-20 | End: 2019-09-30 | Stop reason: HOSPADM

## 2019-09-20 RX ORDER — ACETAMINOPHEN 325 MG/1
650 TABLET ORAL EVERY 4 HOURS PRN
Status: ON HOLD
Start: 2019-09-20 | End: 2019-09-23

## 2019-09-20 RX ORDER — HYDROCODONE BITARTRATE AND ACETAMINOPHEN 7.5; 325 MG/1; MG/1
1 TABLET ORAL EVERY 4 HOURS PRN
Status: DISCONTINUED | OUTPATIENT
Start: 2019-09-20 | End: 2019-09-20 | Stop reason: HOSPADM

## 2019-09-20 RX ORDER — HYDROCODONE BITARTRATE AND ACETAMINOPHEN 5; 325 MG/1; MG/1
1 TABLET ORAL EVERY 6 HOURS PRN
Qty: 10 TABLET | Refills: 0 | Status: ON HOLD | OUTPATIENT
Start: 2019-09-20 | End: 2019-09-23

## 2019-09-20 RX ORDER — METHYLPREDNISOLONE SODIUM SUCCINATE 40 MG/ML
40 INJECTION, POWDER, LYOPHILIZED, FOR SOLUTION INTRAMUSCULAR; INTRAVENOUS EVERY 12 HOURS SCHEDULED
Status: DISCONTINUED | OUTPATIENT
Start: 2019-09-20 | End: 2019-09-20 | Stop reason: HOSPADM

## 2019-09-20 RX ADMIN — SODIUM CHLORIDE 100 ML/HR: 9 INJECTION, SOLUTION INTRAVENOUS at 01:56

## 2019-09-20 RX ADMIN — HYDROCODONE BITARTRATE AND ACETAMINOPHEN 1 TABLET: 5; 325 TABLET ORAL at 00:05

## 2019-09-20 RX ADMIN — SODIUM CHLORIDE 100 ML/HR: 9 INJECTION, SOLUTION INTRAVENOUS at 11:02

## 2019-09-20 RX ADMIN — METHYLPREDNISOLONE SODIUM SUCCINATE 40 MG: 40 INJECTION, POWDER, FOR SOLUTION INTRAMUSCULAR; INTRAVENOUS at 10:54

## 2019-09-20 RX ADMIN — MORPHINE SULFATE 2 MG: 2 INJECTION, SOLUTION INTRAMUSCULAR; INTRAVENOUS at 12:36

## 2019-09-20 RX ADMIN — MORPHINE SULFATE 2 MG: 2 INJECTION, SOLUTION INTRAMUSCULAR; INTRAVENOUS at 07:28

## 2019-09-20 NOTE — PLAN OF CARE
Problem: Patient Care Overview  Goal: Plan of Care Review  Outcome: Ongoing (interventions implemented as appropriate)   09/20/19 9305   Coping/Psychosocial   Plan of Care Reviewed With patient   Plan of Care Review   Progress improving   OTHER   Outcome Summary PT rested on and off during the shift, VSS, c/o pain throughout shift with some relief with PRN pain medication, pt ambulated to bathroom multiple times during shift, IV fluids infusing, remained on RA, pt does become bradycardic at times during the night.

## 2019-09-20 NOTE — PROGRESS NOTES
"Colon and Rectal [[facility]]    Follow-up refractory colitis    /76 (BP Location: Right arm, Patient Position: Lying)   Pulse 50   Temp 97.9 °F (36.6 °C) (Oral)   Resp 18   Ht 172.7 cm (68\")   Wt 68.9 kg (151 lb 12.8 oz)   LMP 08/27/2019   SpO2 96%   BMI 23.08 kg/m²     Lab Results (last 24 hours)     Procedure Component Value Units Date/Time    Basic Metabolic Panel [[id number]]  (Abnormal) Collected:  09/20/19 0508    Specimen:  Blood Updated:  09/20/19 0645     Glucose 127 mg/dL      BUN 4 mg/dL      Creatinine 0.62 mg/dL      Sodium 136 mmol/L      Potassium 4.0 mmol/L      Chloride 104 mmol/L      CO2 24.0 mmol/L      Calcium 8.6 mg/dL      eGFR Non African Amer 107 mL/min/1.73      BUN/Creatinine Ratio 6.5     Anion Gap 8.0 mmol/L     Narrative:       GFR Normal >60  Chronic Kidney Disease <60  Kidney Failure <15    CBC (No Diff) [[id number]]  (Abnormal) Collected:  09/20/19 0508    Specimen:  Blood Updated:  09/20/19 0609     WBC 6.59 10*3/mm3      RBC 3.65 10*6/mm3      Hemoglobin 10.2 g/dL      Hematocrit 33.4 %      MCV 91.5 fL      MCH 27.9 pg      MCHC 30.5 g/dL      RDW 13.2 %      RDW-SD 44.7 fl      MPV 8.9 fL      Platelets 519 10*3/mm3     Blood Culture - Blood, Arm, Left [[id number]] Collected:  09/18/19 1343    Specimen:  Blood from Arm, Left Updated:  09/19/19 1815     Blood Culture No growth at 24 hours    Blood Culture - Blood, Hand, Right [[id number]] Collected:  09/18/19 1342    Specimen:  Blood from Hand, Right Updated:  09/19/19 1815     Blood Culture No growth at 24 hours          I/O this shift:  In: 236 [P.O.:236]  Out: -     Alert and oriented.  No nausea or vomiting.  Good pain control  Good UO. Labs stable  All frequent stools but no blood or mucus.  Good appetite.    Long discussion with the patient.  Ideally IV Remicade would be given but the hospital does not stock this and it takes 4 to 6 weeks for an insurance company to okay it.  She did well on it before.  One option is in " ileostomy naturally she is not excited about that.  She has responded to Imuran before so I recommended that she go to 100 mg every day, do her Entyvio on Monday and avoid raw fruits and vegetables.  Recommended Slim fast or Ensure 3 times a day just for nutrition.  Home today  Follow-up on Monday in our office.    Mahin Abebe MD  09/20/19  2:03 PM

## 2019-09-20 NOTE — PROGRESS NOTES
[facility] Medicine Services  PROGRESS NOTE    Patient Name: Sultana Siegel  : 1980  MRN: [id number]    Date of Admission: 2019  Primary Care Physician: Lori Mcmahon MD    Subjective   Subjective     CC:  F/U abd pain, diarrhea    HPI:  Patient seen this morning. No major complaints other than continued diarrhea and abd pain.    Review of Systems  Gen-no fevers, no chills  CV-no chest pain, no palpitations  Resp-no cough, no dyspnea  GI-no N/V, +diarrhea, +abd pain    All other systems reviewed and negative except any additional pertinent positives and negatives as discussed in HPI.    Objective   Objective     Vital Signs:   Temp:  [97.9 °F (36.6 °C)-98.9 °F (37.2 °C)] 97.9 °F (36.6 °C)  Heart Rate:  [50-66] 50  Resp:  [18] 18  BP: (111-113)/(68-76) 113/76        Physical Exam:  Gen-no acute distress  HENT-NCAT, mucous membranes moist  CV-RRR, S1 S2 normal, no m/r/g  Resp-CTAB, no wheezes or rales  Abd-soft, mild diffuse TTP, ND, +BS  Ext-no edema  Neuro-A&Ox3, no focal deficits  Skin-no rashes  Psych-appropriate mood      Results Reviewed:    Results from last 7 days   Lab Units 19  0508 19  0443 19  1342   WBC 10*3/mm3 6.59 6.51 6.43   HEMOGLOBIN g/dL 10.2* 10.9* 11.0*   HEMATOCRIT % 33.4* 34.7 35.0   PLATELETS 10*3/mm3 519* 490* 500*   PROCALCITONIN ng/mL  --   --  0.13     Results from last 7 days   Lab Units 19  0508 19  0443 19  1342   SODIUM mmol/L 136 136 137   POTASSIUM mmol/L 4.0 4.3 4.0   CHLORIDE mmol/L 104 102 101   CO2 mmol/L 24.0 23.0 23.0   BUN mg/dL 4* 5* 6   CREATININE mg/dL 0.62 0.66 0.75   GLUCOSE mg/dL 127* 164* 102*   CALCIUM mg/dL 8.6 8.5* 8.6   ALT (SGPT) U/L  --   --  5   AST (SGOT) U/L  --   --  7     Estimated Creatinine Clearance: 131.2 mL/min (by C-G formula based on SCr of 0.62 mg/dL).    Microbiology Results Abnormal     Procedure Component Value - Date/Time    Blood Culture - Blood, Arm, Left  [[id number]] Collected:  09/18/19 1343    Lab Status:  Preliminary result Specimen:  Blood from Arm, Left Updated:  09/19/19 1815     Blood Culture No growth at 24 hours    Blood Culture - Blood, Hand, Right [[id number]] Collected:  09/18/19 1342    Lab Status:  Preliminary result Specimen:  Blood from Hand, Right Updated:  09/19/19 1815     Blood Culture No growth at 24 hours    Gastrointestinal Panel, PCR - Stool, Per Rectum [[id number]]  (Normal) Collected:  09/18/19 1621    Lab Status:  Final result Specimen:  Stool from Per Rectum Updated:  09/19/19 0028     Campylobacter Not Detected     Plesiomonas shigelloides Not Detected     Salmonella Not Detected     Vibrio Not Detected     Vibrio cholerae Not Detected     Yersinia enterocolitica Not Detected     Enteroaggregative E. coli (EAEC) Not Detected     Enteropathogenic E. coli (EPEC) Not Detected     Enterotoxigenic E. coli (ETEC) lt/st Not Detected     Shiga-like toxin-producing E. coli (STEC) stx1/stx2 Not Detected     E. coli O157 Not Detected     Shigella/Enteroinvasive E. coli (EIEC) Not Detected     Cryptosporidium Not Detected     Cyclospora cayetanensis Not Detected     Entamoeba histolytica Not Detected     Giardia lamblia Not Detected     Adenovirus F40/41 Not Detected     Astrovirus Not Detected     Norovirus GI/GII Not Detected     Rotavirus A Not Detected     Sapovirus (I, II, IV or V) Not Detected    Narrative:       If Aeromonas, Staphylococcus aureus or Bacillus cereus are suspected, please order [id number]: Stool Culture, Aeromonas, S aureus, B Cereus.          Imaging Results (last 24 hours)     ** No results found for the last 24 hours. **               I have reviewed the medications:  Scheduled Meds:    methylPREDNISolone sodium succinate 40 mg Intravenous Q12H   sodium chloride 10 mL Intravenous Q12H     Continuous Infusions:    sodium chloride 100 mL/hr Last Rate: 100 mL/hr (09/20/19 1102)     PRN Meds:.•  acetaminophen **OR** acetaminophen  **OR** acetaminophen  •  aluminum-magnesium hydroxide-simethicone  •  diazePAM  •  HYDROcodone-acetaminophen  •  influenza vaccine  •  Morphine **AND** naloxone  •  ondansetron **OR** ondansetron  •  sodium chloride      Assessment/Plan   Assessment / Plan     Active Hospital Problems    Diagnosis  POA   • **Exacerbation of Crohn's disease (CMS/HCC) [K50.90]  Yes   • Abdominal pain [R10.9]  Yes   • Fever [R50.9]  Yes   • Diarrhea [R19.7]  Yes   • Weight loss [R63.4]  Yes   • Immunocompromised (CMS/HCC) [D84.9]  Yes      Resolved Hospital Problems   No resolved problems to display.        Brief Hospital Course to date:  Sultana Siegel is a 39 y.o. female with hx of Crohn's disease s/p subtotal colectomy and debridement of intraabdominal abscess in March 2018 who presents from home due to progressively worsening symptoms of diffuse abdominal pain, diarrhea, fevers up to 101, and nausea without vomiting. Has been on Entyvio as well as a prednisone taper.    PLAN:  --No evidence of infection (negative stool diatherix panel), no abscess on CT A/P, just shows diffuse inflammation in the remaining distal colon. Continue IV Solumedrol but will make it twice daily to see if this offers better relief of symptoms. Dr. Abebe following and plans to try Remicade as outpatient.  --Spoke with RN regarding accurate charting of her bowel movements.  --Adjust oral pain meds.   --Continue fluids and supportive care. Tolerating clear liquids. Do not see an indication for TPN at this time. Nutrition following.    DVT Prophylaxis:  mechanical    Disposition: I expect the patient to be discharged home TBD    CODE STATUS:   Code Status and Medical Interventions:   Ordered at: 09/18/19 1009     Code Status:    CPR     Medical Interventions (Level of Support Prior to Arrest):    Full         Electronically signed by Sarina Lima MD, 09/20/19, 1:27 PM.

## 2019-09-20 NOTE — PROGRESS NOTES
Continued Stay Note  [facility]     Patient Name: Sultana Siegel  MRN: [id number]  Today's Date: 9/20/2019    Admit Date: 9/18/2019    Discharge Plan     Row Name 09/20/19 0959       Plan    Plan  Home at DC    Patient/Family in Agreement with Plan  other (see comments)    Plan Comments  I spoke with the pt yesterday. No DC needs.        Discharge Codes    No documentation.       Expected Discharge Date and Time     Expected Discharge Date Expected Discharge Time    Sep 22, 2019             Lori Camargo RN

## 2019-09-20 NOTE — DISCHARGE SUMMARY
[facility] Medicine Services  DISCHARGE SUMMARY    Patient Name: Sultana Siegel  : 1980  MRN: [id number]    Date of Admission: 2019  Date of Discharge:  19  Primary Care Physician: Lori Mcmahon MD    Consults     Date and Time Order Name Status Description    2019 1009 Inpatient Colorectal Surgery Consult Completed           Hospital Course     Presenting Problem:   Abdominal pain [R10.9]    Active Hospital Problems    Diagnosis  POA   • **Exacerbation of Crohn's disease (CMS/HCC) [K50.90]  Yes   • Abdominal pain [R10.9]  Yes   • Diarrhea [R19.7]  Yes   • Weight loss [R63.4]  Yes   • Immunocompromised (CMS/HCC) [D84.9]  Yes      Resolved Hospital Problems    Diagnosis Date Resolved POA   • Fever [R50.9] 2019 Yes          Hospital Course:  Sultana Siegel is a 39 y.o. female with hx of Crohn's disease s/p subtotal colectomy and debridement of intraabdominal abscess in 2018 who presents from home due to progressively worsening symptoms of diffuse abdominal pain, diarrhea, fevers up to 101, and nausea without vomiting. Has been on Entyvio as well as a prednisone taper.     No evidence of infection (negative stool diatherix panel), no abscess on CT A/P, just shows diffuse inflammation in the remaining distal colon. Started on IV Solumedrol. Dr. Abebe followed and plans to try Remicade as outpatient. Her symptoms have really not improved however she is not having any blood or mucus in her stool, and she is tolerating liquids, so Dr. Abebe feels she can go home and follow up in the office on Monday with him to receive Entyvio and discuss plans for ileostomy down the road. He wants her to increase Imuran to 100 mg daily. Will send home on 40 mg prednisone daily until she sees him.       Discharge Follow Up Recommendations for labs/diagnostics:  F/U with PCP in 1 week  F/U with Dr. Abebe on Monday    Day of Discharge     HPI:   Patient seen this  morning. No major complaints other than continued diarrhea and abd pain.    Review of Systems  Gen-no fevers, no chills  CV-no chest pain, no palpitations  Resp-no cough, no dyspnea  GI-no N/V, +diarrhea, +abd pain    Otherwise ROS is negative except as mentioned in the HPI.    Vital Signs:   Temp:  [97.9 °F (36.6 °C)-98.9 °F (37.2 °C)] 97.9 °F (36.6 °C)  Heart Rate:  [50-66] 50  Resp:  [18] 18  BP: (111-113)/(68-76) 113/76     Physical Exam:  Gen-no acute distress  HENT-NCAT, mucous membranes moist  CV-RRR, S1 S2 normal, no m/r/g  Resp-CTAB, no wheezes or rales  Abd-soft, NT, ND, +BS  Ext-no edema  Neuro-A&Ox3, no focal deficits  Skin-no rashes  Psych-appropriate mood      Pertinent  and/or Most Recent Results     Results from last 7 days   Lab Units 09/20/19  0508 09/19/19  0443 09/18/19  1342   WBC 10*3/mm3 6.59 6.51 6.43   HEMOGLOBIN g/dL 10.2* 10.9* 11.0*   HEMATOCRIT % 33.4* 34.7 35.0   PLATELETS 10*3/mm3 519* 490* 500*   SODIUM mmol/L 136 136 137   POTASSIUM mmol/L 4.0 4.3 4.0   CHLORIDE mmol/L 104 102 101   CO2 mmol/L 24.0 23.0 23.0   BUN mg/dL 4* 5* 6   CREATININE mg/dL 0.62 0.66 0.75   GLUCOSE mg/dL 127* 164* 102*   CALCIUM mg/dL 8.6 8.5* 8.6     Results from last 7 days   Lab Units 09/18/19  1342   BILIRUBIN mg/dL 0.4   ALK PHOS U/L 74   ALT (SGPT) U/L 5   AST (SGOT) U/L 7           Invalid input(s): TG, LDLCALC, LDLREALC  Results from last 7 days   Lab Units 09/18/19  1342   PROCALCITONIN ng/mL 0.13   LACTATE mmol/L 0.6       Brief Urine Lab Results  (Last result in the past 365 days)      Color   Clarity   Blood   Leuk Est   Nitrite   Protein   CREAT   Urine HCG        09/18/19 1615 Yellow Clear Small (1+) Negative Negative Negative               Microbiology Results Abnormal     Procedure Component Value - Date/Time    Blood Culture - Blood, Arm, Left [[id number]] Collected:  09/18/19 1344    Lab Status:  Preliminary result Specimen:  Blood from Arm, Left Updated:  09/19/19 1815     Blood Culture No  growth at 24 hours    Blood Culture - Blood, Hand, Right [[id number]] Collected:  09/18/19 1342    Lab Status:  Preliminary result Specimen:  Blood from Hand, Right Updated:  09/19/19 1815     Blood Culture No growth at 24 hours    Gastrointestinal Panel, PCR - Stool, Per Rectum [[id number]]  (Normal) Collected:  09/18/19 1621    Lab Status:  Final result Specimen:  Stool from Per Rectum Updated:  09/19/19 0028     Campylobacter Not Detected     Plesiomonas shigelloides Not Detected     Salmonella Not Detected     Vibrio Not Detected     Vibrio cholerae Not Detected     Yersinia enterocolitica Not Detected     Enteroaggregative E. coli (EAEC) Not Detected     Enteropathogenic E. coli (EPEC) Not Detected     Enterotoxigenic E. coli (ETEC) lt/st Not Detected     Shiga-like toxin-producing E. coli (STEC) stx1/stx2 Not Detected     E. coli O157 Not Detected     Shigella/Enteroinvasive E. coli (EIEC) Not Detected     Cryptosporidium Not Detected     Cyclospora cayetanensis Not Detected     Entamoeba histolytica Not Detected     Giardia lamblia Not Detected     Adenovirus F40/41 Not Detected     Astrovirus Not Detected     Norovirus GI/GII Not Detected     Rotavirus A Not Detected     Sapovirus (I, II, IV or V) Not Detected    Narrative:       If Aeromonas, Staphylococcus aureus or Bacillus cereus are suspected, please order [id number]: Stool Culture, Aeromonas, S aureus, B Cereus.          Imaging Results (all)     Procedure Component Value Units Date/Time    CT Abdomen Pelvis With & Without Contrast [[id number]] Collected:  09/18/19 1217     Updated:  09/18/19 1519    Narrative:       EXAMINATION: CT ABDOMEN/PELVIS WWO CONTRAST- - 09/18/2019     INDICATION: Fever, abdominal pain.     TECHNIQUE: 5 mm unenhanced images and subsequent post-IV contrast portal  venous phase and delayed venous phase 5 mm images through the abdomen  and pelvis.     The radiation dose reduction device was turned on for each scan per the  ALARA (As  Low as Reasonably Achievable) protocol.     COMPARISON: 03/19/2018 and post CT scan     FINDINGS: History indicates previous history of Crohn's disease,  currently with fever abdominal pain and diarrhea. History of previous  subtotal colectomy.     Included lung bases appear clear. No significant abnormalities are seen  of the liver, spleen, pancreas, gallbladder, adrenal glands, or kidneys.  No upper abdominal free air, ascites, adenopathy or inflammatory change  is seen.     Regarding the lower abdomen and pelvis, a small knuckle of  normal-appearing bowel, apparently small bowel, is seen in a  paraumbilical hernia with no evidence of strangulation or obstruction.  There is an anastomotic site in the mid pelvis, presumably a small bowel  descending colon anastomosis. All of the what appears to be the  remaining colon appears significantly thick walled, with mild  surrounding inflammatory change, as can be appreciated from axial images  #63 through #77. There is no evidence of pneumatosis, no evidence of  abscess or free fluid. There is a slightly heterogeneous appearance of  the central uterus of questionable significance, possibly just due to  phase of menstrual cycle. Left ovary is relatively high in position and  appears to contain an enhancing 2 cm cyst. Right ovary is tentatively  identified and grossly normal.       Impression:       1. Patient appears to have a small bowel-distal colon anastomosis. There  is diffuse inflammation of all of what appears to be the remaining  distal colon.  2. No visible small bowel inflammation.  3. Small bowel-containing umbilical hernia with no evidence of  strangulation or obstruction  4. 2 cm enhancing left ovarian cyst.  5. Heterogeneous appearance of the uterus, specifically the endometrium,  possibly just due to phase of patient's menstrual cycle.     DICTATED:   09/18/2019  EDITED/ls :   09/18/2019        XR Chest 1 View [[id number]] Collected:  09/18/19 1103      Updated:  09/18/19 1107    Narrative:          EXAMINATION: XR CHEST 1 VW-      INDICATION: fever      COMPARISON: NONE     FINDINGS: History indicates fever for a couple of days, no current chest  complaints. The heart mediastinum and pulmonary vasculature appear  within normal limits. Lungs appear well-expanded and grossly clear.  There is a suggestion of mild peribronchial thickening which could be  either acute or chronic. No effusion or pneumothorax is seen.           Impression:       Mild peribronchial thickening which may be either acute or  chronic. With history of fever, consider mild bronchitis. No other  evidence of active chest disease.     This report was finalized on 9/18/2019 11:04 AM by DR. Toribio Adam MD.                             Order Current Status    Blood Culture - Blood, Arm, Left Preliminary result    Blood Culture - Blood, Hand, Right Preliminary result        Discharge Details        Discharge Medications      New Medications      Instructions Start Date   acetaminophen 325 MG tablet  Commonly known as:  TYLENOL   650 mg, Oral, Every 4 Hours PRN      HYDROcodone-acetaminophen 5-325 MG per tablet  Commonly known as:  NORCO   1 tablet, Oral, Every 6 Hours PRN         Changes to Medications      Instructions Start Date   azaTHIOprine 50 MG tablet  Commonly known as:  IMURAN  What changed:  how much to take   100 mg, Oral, Daily      predniSONE 20 MG tablet  Commonly known as:  DELTASONE  What changed:    · medication strength  · how much to take   40 mg, Oral, Daily         Continue These Medications      Instructions Start Date   B-12 1000 MCG/ML kit   1,000 mcg, Injection, Weekly, On Tuesdays       diazePAM 5 MG tablet  Commonly known as:  VALIUM   2.5 mg, Oral, Nightly PRN      ENTYVIO IV   Intravenous, Given every 8 week       ferrous sulfate 324 (65 Fe) MG tablet delayed-release EC tablet   324 mg, Oral, Daily With Breakfast      levocetirizine 5 MG tablet  Commonly known as:  XYZAL   5  mg, Oral, As Needed      mesalamine 1.2 g EC tablet  Commonly known as:  LIALDA   1,200 mg, Oral, Daily With Breakfast, 1/2 tablet twice daily.              No Known Allergies      Discharge Disposition:  Home or Self Care    Diet:  Hospital:  Diet Order   Procedures   • Diet Clear Liquid     Discharge:   Diet Instructions     Advance Diet As Tolerated            Discharge Activity:   Activity Instructions     Activity as Tolerated              CODE STATUS:    Code Status and Medical Interventions:   Ordered at: 09/18/19 1009     Code Status:    CPR     Medical Interventions (Level of Support Prior to Arrest):    Full         No future appointments.    Additional Instructions for the Follow-ups that You Need to Schedule     Discharge Follow-up with PCP   As directed       Currently Documented PCP:    Lori Mcmahon MD    PCP Phone Number:    [phone]     Follow Up Details:  1 week         Discharge Follow-up with Specified Provider: Dr. Abebe Monday 9/23   As directed      To:  Dr. Abebe Monday 9/23               Time Spent on Discharge:  32 minutes    Electronically signed by Sarina Lima MD, 09/20/19, 2:44 PM

## 2019-09-23 ENCOUNTER — HOSPITAL ENCOUNTER (INPATIENT)
Facility: HOSPITAL | Age: 39
LOS: 7 days | Discharge: HOME HEALTH | End: 2019-09-30
Attending: COLON & RECTAL SURGERY | Admitting: COLON & RECTAL SURGERY

## 2019-09-23 ENCOUNTER — ANESTHESIA EVENT (OUTPATIENT)
Dept: PERIOP | Facility: HOSPITAL | Age: 39
End: 2019-09-23

## 2019-09-23 ENCOUNTER — ANESTHESIA (OUTPATIENT)
Dept: PERIOP | Facility: HOSPITAL | Age: 39
End: 2019-09-23

## 2019-09-23 DIAGNOSIS — K51.90: ICD-10-CM

## 2019-09-23 DIAGNOSIS — Z74.09 IMPAIRED FUNCTIONAL MOBILITY, BALANCE, GAIT, AND ENDURANCE: Primary | ICD-10-CM

## 2019-09-23 DIAGNOSIS — K50.919 EXACERBATION OF CROHN'S DISEASE WITH COMPLICATION (HCC): ICD-10-CM

## 2019-09-23 LAB
B-HCG UR QL: NEGATIVE
BACTERIA SPEC AEROBE CULT: NORMAL
BACTERIA SPEC AEROBE CULT: NORMAL
GLUCOSE BLD-MCNC: 150 MG/DL (ref 65–99)
INTERNAL NEGATIVE CONTROL: NORMAL
INTERNAL POSITIVE CONTROL: REACTIVE
Lab: NORMAL

## 2019-09-23 PROCEDURE — 25010000002 DEXAMETHASONE SODIUM PHOSPHATE 10 MG/ML SOLUTION: Performed by: ANESTHESIOLOGY

## 2019-09-23 PROCEDURE — 25010000002 ONDANSETRON PER 1 MG: Performed by: NURSE ANESTHETIST, CERTIFIED REGISTERED

## 2019-09-23 PROCEDURE — 25010000002 BUPRENORPHINE PER 0.1 MG: Performed by: ANESTHESIOLOGY

## 2019-09-23 PROCEDURE — 81025 URINE PREGNANCY TEST: CPT | Performed by: COLON & RECTAL SURGERY

## 2019-09-23 PROCEDURE — 87070 CULTURE OTHR SPECIMN AEROBIC: CPT | Performed by: COLON & RECTAL SURGERY

## 2019-09-23 PROCEDURE — 0DNN0ZZ RELEASE SIGMOID COLON, OPEN APPROACH: ICD-10-PCS | Performed by: COLON & RECTAL SURGERY

## 2019-09-23 PROCEDURE — 87075 CULTR BACTERIA EXCEPT BLOOD: CPT | Performed by: COLON & RECTAL SURGERY

## 2019-09-23 PROCEDURE — 25010000002 SUCCINYLCHOLINE PER 20 MG: Performed by: NURSE ANESTHETIST, CERTIFIED REGISTERED

## 2019-09-23 PROCEDURE — 87205 SMEAR GRAM STAIN: CPT | Performed by: COLON & RECTAL SURGERY

## 2019-09-23 PROCEDURE — 25010000002 FENTANYL CITRATE (PF) 100 MCG/2ML SOLUTION: Performed by: NURSE ANESTHETIST, CERTIFIED REGISTERED

## 2019-09-23 PROCEDURE — 25010000002 NEOSTIGMINE 10 MG/10ML SOLUTION: Performed by: NURSE ANESTHETIST, CERTIFIED REGISTERED

## 2019-09-23 PROCEDURE — 94799 UNLISTED PULMONARY SVC/PX: CPT

## 2019-09-23 PROCEDURE — 82947 ASSAY GLUCOSE BLOOD QUANT: CPT | Performed by: NURSE PRACTITIONER

## 2019-09-23 PROCEDURE — 25010000002 HEPARIN (PORCINE) PER 1000 UNITS: Performed by: COLON & RECTAL SURGERY

## 2019-09-23 PROCEDURE — 87206 SMEAR FLUORESCENT/ACID STAI: CPT | Performed by: COLON & RECTAL SURGERY

## 2019-09-23 PROCEDURE — 25010000002 MORPHINE PER 10 MG: Performed by: COLON & RECTAL SURGERY

## 2019-09-23 PROCEDURE — 87116 MYCOBACTERIA CULTURE: CPT | Performed by: COLON & RECTAL SURGERY

## 2019-09-23 PROCEDURE — 0D1B0Z4 BYPASS ILEUM TO CUTANEOUS, OPEN APPROACH: ICD-10-PCS | Performed by: COLON & RECTAL SURGERY

## 2019-09-23 PROCEDURE — 25010000002 MIDAZOLAM PER 1 MG: Performed by: NURSE ANESTHETIST, CERTIFIED REGISTERED

## 2019-09-23 PROCEDURE — 25010000002 DIAZEPAM PER 5 MG: Performed by: ANESTHESIOLOGY

## 2019-09-23 PROCEDURE — 25010000002 DEXAMETHASONE PER 1 MG: Performed by: NURSE ANESTHETIST, CERTIFIED REGISTERED

## 2019-09-23 PROCEDURE — 87176 TISSUE HOMOGENIZATION CULTR: CPT | Performed by: COLON & RECTAL SURGERY

## 2019-09-23 PROCEDURE — 0DTN0ZZ RESECTION OF SIGMOID COLON, OPEN APPROACH: ICD-10-PCS | Performed by: COLON & RECTAL SURGERY

## 2019-09-23 PROCEDURE — 87102 FUNGUS ISOLATION CULTURE: CPT | Performed by: COLON & RECTAL SURGERY

## 2019-09-23 PROCEDURE — 25010000002 ERTAPENEM 1 GM/100ML SOLUTION: Performed by: COLON & RECTAL SURGERY

## 2019-09-23 PROCEDURE — 88307 TISSUE EXAM BY PATHOLOGIST: CPT | Performed by: COLON & RECTAL SURGERY

## 2019-09-23 PROCEDURE — 25010000002 METHYLPREDNISOLONE PER 125 MG: Performed by: NURSE ANESTHETIST, CERTIFIED REGISTERED

## 2019-09-23 PROCEDURE — 25010000002 PROPOFOL 10 MG/ML EMULSION: Performed by: NURSE ANESTHETIST, CERTIFIED REGISTERED

## 2019-09-23 PROCEDURE — 99222 1ST HOSP IP/OBS MODERATE 55: CPT | Performed by: NURSE PRACTITIONER

## 2019-09-23 DEVICE — PROXIMATE RELOADABLE LINEAR CUTTER WITH SAFETY LOCK-OUT.  75MM LINEAR CUTTER.
Type: IMPLANTABLE DEVICE | Status: FUNCTIONAL
Brand: PROXIMATE

## 2019-09-23 RX ORDER — MIDAZOLAM HYDROCHLORIDE 1 MG/ML
INJECTION INTRAMUSCULAR; INTRAVENOUS AS NEEDED
Status: DISCONTINUED | OUTPATIENT
Start: 2019-09-23 | End: 2019-09-23 | Stop reason: SURG

## 2019-09-23 RX ORDER — HYDROMORPHONE HYDROCHLORIDE 1 MG/ML
0.5 INJECTION, SOLUTION INTRAMUSCULAR; INTRAVENOUS; SUBCUTANEOUS
Status: DISCONTINUED | OUTPATIENT
Start: 2019-09-23 | End: 2019-09-23 | Stop reason: HOSPADM

## 2019-09-23 RX ORDER — ROCURONIUM BROMIDE 10 MG/ML
INJECTION, SOLUTION INTRAVENOUS AS NEEDED
Status: DISCONTINUED | OUTPATIENT
Start: 2019-09-23 | End: 2019-09-23 | Stop reason: SURG

## 2019-09-23 RX ORDER — MAGNESIUM HYDROXIDE 1200 MG/15ML
LIQUID ORAL AS NEEDED
Status: DISCONTINUED | OUTPATIENT
Start: 2019-09-23 | End: 2019-09-23 | Stop reason: HOSPADM

## 2019-09-23 RX ORDER — LIDOCAINE HYDROCHLORIDE 10 MG/ML
0.5 INJECTION, SOLUTION EPIDURAL; INFILTRATION; INTRACAUDAL; PERINEURAL ONCE AS NEEDED
Status: COMPLETED | OUTPATIENT
Start: 2019-09-23 | End: 2019-09-23

## 2019-09-23 RX ORDER — PROPOFOL 10 MG/ML
VIAL (ML) INTRAVENOUS AS NEEDED
Status: DISCONTINUED | OUTPATIENT
Start: 2019-09-23 | End: 2019-09-23 | Stop reason: SURG

## 2019-09-23 RX ORDER — ACETAMINOPHEN 500 MG
1000 TABLET ORAL ONCE
Status: COMPLETED | OUTPATIENT
Start: 2019-09-23 | End: 2019-09-23

## 2019-09-23 RX ORDER — DEXAMETHASONE SODIUM PHOSPHATE 10 MG/ML
INJECTION, SOLUTION INTRAMUSCULAR; INTRAVENOUS
Status: COMPLETED | OUTPATIENT
Start: 2019-09-23 | End: 2019-09-23

## 2019-09-23 RX ORDER — ONDANSETRON 2 MG/ML
INJECTION INTRAMUSCULAR; INTRAVENOUS AS NEEDED
Status: DISCONTINUED | OUTPATIENT
Start: 2019-09-23 | End: 2019-09-23 | Stop reason: SURG

## 2019-09-23 RX ORDER — SODIUM CHLORIDE, SODIUM LACTATE, POTASSIUM CHLORIDE, CALCIUM CHLORIDE 600; 310; 30; 20 MG/100ML; MG/100ML; MG/100ML; MG/100ML
9 INJECTION, SOLUTION INTRAVENOUS CONTINUOUS PRN
Status: DISCONTINUED | OUTPATIENT
Start: 2019-09-23 | End: 2019-09-30

## 2019-09-23 RX ORDER — PROMETHAZINE HYDROCHLORIDE 25 MG/1
25 TABLET ORAL ONCE AS NEEDED
Status: DISCONTINUED | OUTPATIENT
Start: 2019-09-23 | End: 2019-09-23 | Stop reason: HOSPADM

## 2019-09-23 RX ORDER — PROMETHAZINE HYDROCHLORIDE 25 MG/1
25 SUPPOSITORY RECTAL ONCE AS NEEDED
Status: DISCONTINUED | OUTPATIENT
Start: 2019-09-23 | End: 2019-09-23 | Stop reason: HOSPADM

## 2019-09-23 RX ORDER — PREGABALIN 75 MG/1
75 CAPSULE ORAL ONCE
Status: COMPLETED | OUTPATIENT
Start: 2019-09-23 | End: 2019-09-23

## 2019-09-23 RX ORDER — FAMOTIDINE 20 MG/1
20 TABLET, FILM COATED ORAL
Status: COMPLETED | OUTPATIENT
Start: 2019-09-23 | End: 2019-09-23

## 2019-09-23 RX ORDER — SUCCINYLCHOLINE CHLORIDE 20 MG/ML
INJECTION INTRAMUSCULAR; INTRAVENOUS AS NEEDED
Status: DISCONTINUED | OUTPATIENT
Start: 2019-09-23 | End: 2019-09-23 | Stop reason: SURG

## 2019-09-23 RX ORDER — DEXAMETHASONE SODIUM PHOSPHATE 4 MG/ML
INJECTION, SOLUTION INTRA-ARTICULAR; INTRALESIONAL; INTRAMUSCULAR; INTRAVENOUS; SOFT TISSUE AS NEEDED
Status: DISCONTINUED | OUTPATIENT
Start: 2019-09-23 | End: 2019-09-23 | Stop reason: SURG

## 2019-09-23 RX ORDER — BUPIVACAINE HYDROCHLORIDE 2.5 MG/ML
INJECTION, SOLUTION EPIDURAL; INFILTRATION; INTRACAUDAL
Status: COMPLETED | OUTPATIENT
Start: 2019-09-23 | End: 2019-09-23

## 2019-09-23 RX ORDER — ONDANSETRON 2 MG/ML
4 INJECTION INTRAMUSCULAR; INTRAVENOUS ONCE AS NEEDED
Status: DISCONTINUED | OUTPATIENT
Start: 2019-09-23 | End: 2019-09-23 | Stop reason: HOSPADM

## 2019-09-23 RX ORDER — PROMETHAZINE HYDROCHLORIDE 25 MG/ML
12.5 INJECTION, SOLUTION INTRAMUSCULAR; INTRAVENOUS ONCE AS NEEDED
Status: DISCONTINUED | OUTPATIENT
Start: 2019-09-23 | End: 2019-09-23 | Stop reason: HOSPADM

## 2019-09-23 RX ORDER — SODIUM CHLORIDE 0.9 % (FLUSH) 0.9 %
3-10 SYRINGE (ML) INJECTION AS NEEDED
Status: DISCONTINUED | OUTPATIENT
Start: 2019-09-23 | End: 2019-09-23 | Stop reason: HOSPADM

## 2019-09-23 RX ORDER — SODIUM CHLORIDE 9 MG/ML
100 INJECTION, SOLUTION INTRAVENOUS ONCE
Status: COMPLETED | OUTPATIENT
Start: 2019-09-23 | End: 2019-09-24

## 2019-09-23 RX ORDER — HEPARIN SODIUM 5000 [USP'U]/ML
5000 INJECTION, SOLUTION INTRAVENOUS; SUBCUTANEOUS ONCE
Status: COMPLETED | OUTPATIENT
Start: 2019-09-23 | End: 2019-09-23

## 2019-09-23 RX ORDER — NEOSTIGMINE METHYLSULFATE 1 MG/ML
INJECTION, SOLUTION INTRAVENOUS AS NEEDED
Status: DISCONTINUED | OUTPATIENT
Start: 2019-09-23 | End: 2019-09-23 | Stop reason: SURG

## 2019-09-23 RX ORDER — MORPHINE SULFATE 2 MG/ML
2 INJECTION, SOLUTION INTRAMUSCULAR; INTRAVENOUS
Status: DISCONTINUED | OUTPATIENT
Start: 2019-09-23 | End: 2019-09-25

## 2019-09-23 RX ORDER — MELOXICAM 15 MG/1
15 TABLET ORAL ONCE
Status: COMPLETED | OUTPATIENT
Start: 2019-09-23 | End: 2019-09-23

## 2019-09-23 RX ORDER — NALOXONE HCL 0.4 MG/ML
0.4 VIAL (ML) INJECTION
Status: DISCONTINUED | OUTPATIENT
Start: 2019-09-23 | End: 2019-09-25

## 2019-09-23 RX ORDER — ACETAMINOPHEN 500 MG
1000 TABLET ORAL EVERY 8 HOURS SCHEDULED
Status: COMPLETED | OUTPATIENT
Start: 2019-09-23 | End: 2019-09-25

## 2019-09-23 RX ORDER — FERROUS SULFATE 325(65) MG
325 TABLET ORAL
COMMUNITY
End: 2019-09-30 | Stop reason: HOSPADM

## 2019-09-23 RX ORDER — SODIUM CHLORIDE 0.9 % (FLUSH) 0.9 %
3 SYRINGE (ML) INJECTION EVERY 12 HOURS SCHEDULED
Status: DISCONTINUED | OUTPATIENT
Start: 2019-09-23 | End: 2019-09-23 | Stop reason: HOSPADM

## 2019-09-23 RX ORDER — METHYLPREDNISOLONE SODIUM SUCCINATE 125 MG/2ML
INJECTION, POWDER, LYOPHILIZED, FOR SOLUTION INTRAMUSCULAR; INTRAVENOUS AS NEEDED
Status: DISCONTINUED | OUTPATIENT
Start: 2019-09-23 | End: 2019-09-23 | Stop reason: SURG

## 2019-09-23 RX ORDER — ONDANSETRON 2 MG/ML
4 INJECTION INTRAMUSCULAR; INTRAVENOUS EVERY 6 HOURS PRN
Status: DISCONTINUED | OUTPATIENT
Start: 2019-09-23 | End: 2019-09-30

## 2019-09-23 RX ORDER — DIAZEPAM 5 MG/ML
2.5 INJECTION, SOLUTION INTRAMUSCULAR; INTRAVENOUS ONCE AS NEEDED
Status: COMPLETED | OUTPATIENT
Start: 2019-09-23 | End: 2019-09-23

## 2019-09-23 RX ORDER — DEXAMETHASONE SODIUM PHOSPHATE 4 MG/ML
8 INJECTION, SOLUTION INTRA-ARTICULAR; INTRALESIONAL; INTRAMUSCULAR; INTRAVENOUS; SOFT TISSUE ONCE AS NEEDED
Status: DISCONTINUED | OUTPATIENT
Start: 2019-09-23 | End: 2019-09-23 | Stop reason: HOSPADM

## 2019-09-23 RX ORDER — FENTANYL CITRATE 50 UG/ML
50 INJECTION, SOLUTION INTRAMUSCULAR; INTRAVENOUS
Status: DISCONTINUED | OUTPATIENT
Start: 2019-09-23 | End: 2019-09-23 | Stop reason: HOSPADM

## 2019-09-23 RX ORDER — DIAZEPAM 5 MG/1
5 TABLET ORAL EVERY 6 HOURS PRN
Status: DISCONTINUED | OUTPATIENT
Start: 2019-09-23 | End: 2019-09-30 | Stop reason: HOSPADM

## 2019-09-23 RX ORDER — OXYCODONE HYDROCHLORIDE 5 MG/1
5 TABLET ORAL EVERY 4 HOURS PRN
Status: DISCONTINUED | OUTPATIENT
Start: 2019-09-23 | End: 2019-09-25

## 2019-09-23 RX ORDER — FENTANYL CITRATE 50 UG/ML
INJECTION, SOLUTION INTRAMUSCULAR; INTRAVENOUS AS NEEDED
Status: DISCONTINUED | OUTPATIENT
Start: 2019-09-23 | End: 2019-09-23 | Stop reason: SURG

## 2019-09-23 RX ORDER — GLYCOPYRROLATE 0.2 MG/ML
INJECTION INTRAMUSCULAR; INTRAVENOUS AS NEEDED
Status: DISCONTINUED | OUTPATIENT
Start: 2019-09-23 | End: 2019-09-23 | Stop reason: SURG

## 2019-09-23 RX ORDER — SCOLOPAMINE TRANSDERMAL SYSTEM 1 MG/1
1 PATCH, EXTENDED RELEASE TRANSDERMAL ONCE
Status: DISCONTINUED | OUTPATIENT
Start: 2019-09-23 | End: 2019-09-23

## 2019-09-23 RX ORDER — BUPRENORPHINE HYDROCHLORIDE 0.32 MG/ML
INJECTION INTRAMUSCULAR; INTRAVENOUS
Status: COMPLETED | OUTPATIENT
Start: 2019-09-23 | End: 2019-09-23

## 2019-09-23 RX ADMIN — ROCURONIUM BROMIDE 10 MG: 10 INJECTION INTRAVENOUS at 14:40

## 2019-09-23 RX ADMIN — PREGABALIN 75 MG: 75 CAPSULE ORAL at 13:15

## 2019-09-23 RX ADMIN — MORPHINE SULFATE 2 MG: 2 INJECTION, SOLUTION INTRAMUSCULAR; INTRAVENOUS at 21:19

## 2019-09-23 RX ADMIN — NEOSTIGMINE METHYLSULFATE 2.5 MG: 1 INJECTION, SOLUTION INTRAVENOUS at 15:35

## 2019-09-23 RX ADMIN — PROPOFOL 25 MCG/KG/MIN: 10 INJECTION, EMULSION INTRAVENOUS at 14:25

## 2019-09-23 RX ADMIN — GLYCOPYRROLATE 0.4 MG: 0.2 INJECTION, SOLUTION INTRAMUSCULAR; INTRAVENOUS at 15:35

## 2019-09-23 RX ADMIN — ROCURONIUM BROMIDE 3 MG: 10 INJECTION INTRAVENOUS at 14:18

## 2019-09-23 RX ADMIN — MELOXICAM 15 MG: 15 TABLET ORAL at 13:14

## 2019-09-23 RX ADMIN — FAMOTIDINE 20 MG: 20 TABLET ORAL at 12:46

## 2019-09-23 RX ADMIN — MORPHINE SULFATE 2 MG: 2 INJECTION, SOLUTION INTRAMUSCULAR; INTRAVENOUS at 18:24

## 2019-09-23 RX ADMIN — FENTANYL CITRATE 25 MCG: 50 INJECTION, SOLUTION INTRAMUSCULAR; INTRAVENOUS at 15:03

## 2019-09-23 RX ADMIN — DEXAMETHASONE SODIUM PHOSPHATE 2 MG: 10 INJECTION INTRAMUSCULAR; INTRAVENOUS at 14:27

## 2019-09-23 RX ADMIN — SUCCINYLCHOLINE CHLORIDE 100 MG: 20 INJECTION, SOLUTION INTRAMUSCULAR; INTRAVENOUS; PARENTERAL at 14:19

## 2019-09-23 RX ADMIN — ROCURONIUM BROMIDE 27 MG: 10 INJECTION INTRAVENOUS at 14:29

## 2019-09-23 RX ADMIN — SODIUM CHLORIDE, POTASSIUM CHLORIDE, SODIUM LACTATE AND CALCIUM CHLORIDE: 600; 310; 30; 20 INJECTION, SOLUTION INTRAVENOUS at 15:15

## 2019-09-23 RX ADMIN — PROPOFOL 150 MG: 10 INJECTION, EMULSION INTRAVENOUS at 14:19

## 2019-09-23 RX ADMIN — FENTANYL CITRATE 75 MCG: 50 INJECTION, SOLUTION INTRAMUSCULAR; INTRAVENOUS at 15:42

## 2019-09-23 RX ADMIN — ONDANSETRON 4 MG: 2 INJECTION INTRAMUSCULAR; INTRAVENOUS at 15:29

## 2019-09-23 RX ADMIN — BUPIVACAINE HYDROCHLORIDE 5 ML: 2.5 INJECTION, SOLUTION EPIDURAL; INFILTRATION; INTRACAUDAL; PERINEURAL at 14:27

## 2019-09-23 RX ADMIN — SODIUM CHLORIDE 100 ML/HR: 9 INJECTION, SOLUTION INTRAVENOUS at 17:11

## 2019-09-23 RX ADMIN — DIAZEPAM 2.5 MG: 5 INJECTION, SOLUTION INTRAMUSCULAR; INTRAVENOUS at 16:03

## 2019-09-23 RX ADMIN — ACETAMINOPHEN 1000 MG: 500 TABLET, FILM COATED ORAL at 18:22

## 2019-09-23 RX ADMIN — DEXAMETHASONE SODIUM PHOSPHATE 8 MG: 4 INJECTION, SOLUTION INTRAMUSCULAR; INTRAVENOUS at 14:45

## 2019-09-23 RX ADMIN — MIDAZOLAM HYDROCHLORIDE 2 MG: 1 INJECTION, SOLUTION INTRAMUSCULAR; INTRAVENOUS at 14:14

## 2019-09-23 RX ADMIN — SODIUM CHLORIDE, POTASSIUM CHLORIDE, SODIUM LACTATE AND CALCIUM CHLORIDE 9 ML/HR: 600; 310; 30; 20 INJECTION, SOLUTION INTRAVENOUS at 12:47

## 2019-09-23 RX ADMIN — ERTAPENEM SODIUM 1 G: 1 INJECTION, POWDER, LYOPHILIZED, FOR SOLUTION INTRAMUSCULAR; INTRAVENOUS at 14:14

## 2019-09-23 RX ADMIN — BUPRENORPHINE HYDROCHLORIDE 0.3 MG: 0.32 INJECTION INTRAMUSCULAR; INTRAVENOUS at 14:27

## 2019-09-23 RX ADMIN — METHYLPREDNISOLONE SODIUM SUCCINATE 125 MG: 125 INJECTION, POWDER, FOR SOLUTION INTRAMUSCULAR; INTRAVENOUS at 14:40

## 2019-09-23 RX ADMIN — LIDOCAINE HYDROCHLORIDE 0.5 ML: 10 INJECTION, SOLUTION EPIDURAL; INFILTRATION; INTRACAUDAL; PERINEURAL at 12:47

## 2019-09-23 RX ADMIN — HEPARIN SODIUM 5000 UNITS: 5000 INJECTION, SOLUTION INTRAVENOUS; SUBCUTANEOUS at 13:14

## 2019-09-23 RX ADMIN — SCOPALAMINE 1 PATCH: 1 PATCH, EXTENDED RELEASE TRANSDERMAL at 13:13

## 2019-09-23 RX ADMIN — ACETAMINOPHEN 1000 MG: 500 TABLET ORAL at 13:14

## 2019-09-23 NOTE — ANESTHESIA PROCEDURE NOTES
Peripheral Block      Patient reassessed immediately prior to procedure    Patient location during procedure: OR  Reason for block: at surgeon's request and post-op pain management  Performed by  Anesthesiologist: Gil Hobson MD  Preanesthetic Checklist  Completed: patient identified, site marked, surgical consent, pre-op evaluation, timeout performed, IV checked, risks and benefits discussed and monitors and equipment checked  Prep:  Pt Position: supine  Sterile barriers:cap, gloves, sterile barriers and mask  Prep: ChloraPrep  Patient monitoring: blood pressure monitoring, continuous pulse oximetry and EKG  Procedure  Sedation:yes  Performed under: general  Guidance:ultrasound guided  Images:still images obtained, printed/placed on chart    Laterality:Bilateral  Block Type:TAP  Injection Technique:single-shot  Needle Type:short-bevel and echogenic  Needle Gauge:20 G  Resistance on Injection: none    Medications Used: buprenorphine (BUPRENEX) injection, 0.3 mg  dexamethasone sodium phosphate injection, 2 mg  bupivacaine PF (MARCAINE) 0.25 % injection, 5 mL  Med admintered at 9/23/2019 2:27 PM      Medications  Comment:Block Injection:  LA dose divided between Right and Left block        Post Assessment  Injection Assessment: negative aspiration for heme, incremental injection and no paresthesia on injection  Patient Tolerance:comfortable throughout block  Complications:no  Additional Notes      Under Ultrasound guidance, a BBraun 4inch 360 degree needle was advanced with Normal Saline hydro dissection of tissue.  The Internal Oblique and Transversus Abdominus muscles where visualized.  At or before the aponeurosis of Internal Oblique, local anesthetic spread was visualized in the Transversus Abdominus Plane. Injection was made incrementally with aspiration every 5 mls.  There was no  intravascular injection,  injection pressure was normal, there was no neural injection, and the procedure was completed without  difficulty.  Thank You.

## 2019-09-23 NOTE — ANESTHESIA PROCEDURE NOTES
Airway  Urgency: elective    Date/Time: 9/23/2019 2:21 PM  Airway not difficult    General Information and Staff    Patient location during procedure: OR    Indications and Patient Condition  Indications for airway management: airway protection    Preoxygenated: yes  Mask difficulty assessment: 1 - vent by mask    Final Airway Details  Final airway type: endotracheal airway      Successful airway: ETT  Cuffed: yes   Successful intubation technique: direct laryngoscopy  Facilitating devices/methods: intubating stylet and cricoid pressure  Endotracheal tube insertion site: oral  Blade: Damon  Blade size: 2  ETT size (mm): 7.0  Cormack-Lehane Classification: grade I - full view of glottis  Placement verified by: chest auscultation and capnometry   Measured from: lips  ETT/EBT  to lips (cm): 20  Number of attempts at approach: 1  Assessment: lips, teeth, and gum same as pre-op and atraumatic intubation

## 2019-09-23 NOTE — ANESTHESIA POSTPROCEDURE EVALUATION
Patient: Sultana Siegel    Procedure Summary     Date:  09/23/19 Room / Location:   [facility] / [facility]    Anesthesia Start:  1414 Anesthesia Stop:  1550    Procedure:  EXPLORATORY LAPAROTOMY WITH SIGMOID COLECTOMY , ENTEROLYSIS ,AND  ILEOSTOMY (N/A ) Diagnosis:      Surgeon:  Mahin Abebe MD Provider:  Georgi Castro MD    Anesthesia Type:  general with block ASA Status:  2          Anesthesia Type: general with block  Last vitals  BP   157/99 (09/23/19 1547)   Temp   97.9 °F (36.6 °C) (09/23/19 1547)   Pulse   82 (09/23/19 1547)   Resp   16 (09/23/19 1547)     SpO2   100 % (09/23/19 1547)     Post Anesthesia Care and Evaluation    Patient location during evaluation: PACU  Patient participation: complete - patient participated  Level of consciousness: awake and alert  Pain score: 0  Pain management: adequate  Airway patency: patent  Anesthetic complications: No anesthetic complications  PONV Status: none  Cardiovascular status: hemodynamically stable and acceptable  Respiratory status: nonlabored ventilation, acceptable and nasal cannula  Hydration status: acceptable

## 2019-09-23 NOTE — ANESTHESIA PREPROCEDURE EVALUATION
Anesthesia Evaluation     Patient summary reviewed and Nursing notes reviewed   NPO Solid Status: > 8 hours  NPO Liquid Status: > 4 hours           Airway   Mallampati: I  TM distance: >3 FB  Neck ROM: full  No difficulty expected  Dental      Pulmonary    (-) COPD, asthma, shortness of breath, recent URI, not a smoker  Cardiovascular     (-) hypertension, past MI, dysrhythmias, angina, hyperlipidemia      Neuro/Psych  (-) seizures, CVA  GI/Hepatic/Renal/Endo    (-) liver disease, no renal disease, diabetes, hypothyroidism    Musculoskeletal     Abdominal    Substance History      OB/GYN          Other        ROS/Med Hx Other: Crohns disease long standing  prednisone 20   Valium 5  Nauseated no vomiting       Phys Exam Other: Grade 1 view Mac 3 2018                Anesthesia Plan    ASA 2     general with block   Rapid sequence(TAPblocks , Propofol infusion,  Opiate sparing    Steroid coverage  RSI CP ETT )  intravenous induction   Anesthetic plan, all risks, benefits, and alternatives have been provided, discussed and informed consent has been obtained with: patient.    Plan discussed with CRNA.

## 2019-09-24 LAB
ANION GAP SERPL CALCULATED.3IONS-SCNC: 9 MMOL/L (ref 5–15)
BUN BLD-MCNC: 8 MG/DL (ref 6–20)
BUN/CREAT SERPL: 12.1 (ref 7–25)
CALCIUM SPEC-SCNC: 8.5 MG/DL (ref 8.6–10.5)
CHLORIDE SERPL-SCNC: 101 MMOL/L (ref 98–107)
CO2 SERPL-SCNC: 28 MMOL/L (ref 22–29)
CREAT BLD-MCNC: 0.66 MG/DL (ref 0.57–1)
GFR SERPL CREATININE-BSD FRML MDRD: 99 ML/MIN/1.73
GLUCOSE BLD-MCNC: 132 MG/DL (ref 65–99)
HBA1C MFR BLD: 5.5 % (ref 4.8–5.6)
HCT VFR BLD AUTO: 34.3 % (ref 34–46.6)
HGB BLD-MCNC: 10.7 G/DL (ref 12–15.9)
POTASSIUM BLD-SCNC: 4.3 MMOL/L (ref 3.5–5.2)
SODIUM BLD-SCNC: 138 MMOL/L (ref 136–145)

## 2019-09-24 PROCEDURE — 97530 THERAPEUTIC ACTIVITIES: CPT

## 2019-09-24 PROCEDURE — 97161 PT EVAL LOW COMPLEX 20 MIN: CPT

## 2019-09-24 PROCEDURE — 85014 HEMATOCRIT: CPT | Performed by: NURSE PRACTITIONER

## 2019-09-24 PROCEDURE — 97165 OT EVAL LOW COMPLEX 30 MIN: CPT

## 2019-09-24 PROCEDURE — 25010000002 ONDANSETRON PER 1 MG: Performed by: COLON & RECTAL SURGERY

## 2019-09-24 PROCEDURE — 97116 GAIT TRAINING THERAPY: CPT

## 2019-09-24 PROCEDURE — 25010000002 ENOXAPARIN PER 10 MG: Performed by: COLON & RECTAL SURGERY

## 2019-09-24 PROCEDURE — 85018 HEMOGLOBIN: CPT | Performed by: NURSE PRACTITIONER

## 2019-09-24 PROCEDURE — 80048 BASIC METABOLIC PNL TOTAL CA: CPT | Performed by: NURSE PRACTITIONER

## 2019-09-24 PROCEDURE — 83036 HEMOGLOBIN GLYCOSYLATED A1C: CPT | Performed by: NURSE PRACTITIONER

## 2019-09-24 PROCEDURE — 25010000002 MORPHINE PER 10 MG: Performed by: COLON & RECTAL SURGERY

## 2019-09-24 PROCEDURE — 99232 SBSQ HOSP IP/OBS MODERATE 35: CPT | Performed by: INTERNAL MEDICINE

## 2019-09-24 RX ADMIN — MORPHINE SULFATE 2 MG: 2 INJECTION, SOLUTION INTRAMUSCULAR; INTRAVENOUS at 16:30

## 2019-09-24 RX ADMIN — MORPHINE SULFATE 2 MG: 2 INJECTION, SOLUTION INTRAMUSCULAR; INTRAVENOUS at 11:35

## 2019-09-24 RX ADMIN — DIAZEPAM 5 MG: 5 TABLET ORAL at 23:10

## 2019-09-24 RX ADMIN — OXYCODONE HYDROCHLORIDE 5 MG: 5 TABLET ORAL at 14:25

## 2019-09-24 RX ADMIN — MORPHINE SULFATE 2 MG: 2 INJECTION, SOLUTION INTRAMUSCULAR; INTRAVENOUS at 01:07

## 2019-09-24 RX ADMIN — ACETAMINOPHEN 1000 MG: 500 TABLET, FILM COATED ORAL at 05:48

## 2019-09-24 RX ADMIN — OXYCODONE HYDROCHLORIDE 5 MG: 5 TABLET ORAL at 07:00

## 2019-09-24 RX ADMIN — OXYCODONE HYDROCHLORIDE 5 MG: 5 TABLET ORAL at 23:10

## 2019-09-24 RX ADMIN — ENOXAPARIN SODIUM 40 MG: 40 INJECTION SUBCUTANEOUS at 08:39

## 2019-09-24 RX ADMIN — MORPHINE SULFATE 2 MG: 2 INJECTION, SOLUTION INTRAMUSCULAR; INTRAVENOUS at 21:12

## 2019-09-24 RX ADMIN — ACETAMINOPHEN 1000 MG: 500 TABLET, FILM COATED ORAL at 21:12

## 2019-09-24 RX ADMIN — ONDANSETRON 4 MG: 2 INJECTION INTRAMUSCULAR; INTRAVENOUS at 14:26

## 2019-09-24 RX ADMIN — ACETAMINOPHEN 1000 MG: 500 TABLET, FILM COATED ORAL at 14:21

## 2019-09-24 RX ADMIN — SODIUM CHLORIDE 1000 ML: 9 INJECTION, SOLUTION INTRAVENOUS at 13:01

## 2019-09-25 LAB
CYTO UR: NORMAL
DEPRECATED RDW RBC AUTO: 45.2 FL (ref 37–54)
ERYTHROCYTE [DISTWIDTH] IN BLOOD BY AUTOMATED COUNT: 13.5 % (ref 12.3–15.4)
HCT VFR BLD AUTO: 31.7 % (ref 34–46.6)
HGB BLD-MCNC: 9.6 G/DL (ref 12–15.9)
LAB AP CASE REPORT: NORMAL
LAB AP CLINICAL INFORMATION: NORMAL
MCH RBC QN AUTO: 27.7 PG (ref 26.6–33)
MCHC RBC AUTO-ENTMCNC: 30.3 G/DL (ref 31.5–35.7)
MCV RBC AUTO: 91.6 FL (ref 79–97)
PATH REPORT.FINAL DX SPEC: NORMAL
PATH REPORT.GROSS SPEC: NORMAL
PLATELET # BLD AUTO: 563 10*3/MM3 (ref 140–450)
PMV BLD AUTO: 8.6 FL (ref 6–12)
RBC # BLD AUTO: 3.46 10*6/MM3 (ref 3.77–5.28)
WBC NRBC COR # BLD: 7.06 10*3/MM3 (ref 3.4–10.8)

## 2019-09-25 PROCEDURE — 99232 SBSQ HOSP IP/OBS MODERATE 35: CPT | Performed by: NURSE PRACTITIONER

## 2019-09-25 PROCEDURE — 25010000002 MORPHINE PER 10 MG: Performed by: COLON & RECTAL SURGERY

## 2019-09-25 PROCEDURE — 85027 COMPLETE CBC AUTOMATED: CPT | Performed by: INTERNAL MEDICINE

## 2019-09-25 PROCEDURE — 97116 GAIT TRAINING THERAPY: CPT

## 2019-09-25 PROCEDURE — 25010000002 ENOXAPARIN PER 10 MG: Performed by: COLON & RECTAL SURGERY

## 2019-09-25 PROCEDURE — 97110 THERAPEUTIC EXERCISES: CPT

## 2019-09-25 RX ORDER — NALOXONE HCL 0.4 MG/ML
0.4 VIAL (ML) INJECTION
Status: DISCONTINUED | OUTPATIENT
Start: 2019-09-25 | End: 2019-09-30

## 2019-09-25 RX ORDER — MORPHINE SULFATE 2 MG/ML
1 INJECTION, SOLUTION INTRAMUSCULAR; INTRAVENOUS
Status: DISCONTINUED | OUTPATIENT
Start: 2019-09-25 | End: 2019-09-30

## 2019-09-25 RX ORDER — OXYCODONE HYDROCHLORIDE 15 MG/1
7.5 TABLET ORAL EVERY 4 HOURS PRN
Status: DISCONTINUED | OUTPATIENT
Start: 2019-09-25 | End: 2019-09-30

## 2019-09-25 RX ADMIN — ACETAMINOPHEN 1000 MG: 500 TABLET, FILM COATED ORAL at 14:17

## 2019-09-25 RX ADMIN — OXYCODONE HYDROCHLORIDE 7.5 MG: 15 TABLET ORAL at 16:12

## 2019-09-25 RX ADMIN — DIAZEPAM 5 MG: 5 TABLET ORAL at 06:15

## 2019-09-25 RX ADMIN — MORPHINE SULFATE 2 MG: 2 INJECTION, SOLUTION INTRAMUSCULAR; INTRAVENOUS at 08:47

## 2019-09-25 RX ADMIN — ACETAMINOPHEN 1000 MG: 500 TABLET, FILM COATED ORAL at 06:15

## 2019-09-25 RX ADMIN — OXYCODONE HYDROCHLORIDE 7.5 MG: 15 TABLET ORAL at 12:43

## 2019-09-25 RX ADMIN — OXYCODONE HYDROCHLORIDE 7.5 MG: 15 TABLET ORAL at 22:15

## 2019-09-25 RX ADMIN — MORPHINE SULFATE 1 MG: 2 INJECTION, SOLUTION INTRAMUSCULAR; INTRAVENOUS at 19:43

## 2019-09-25 RX ADMIN — ENOXAPARIN SODIUM 40 MG: 40 INJECTION SUBCUTANEOUS at 08:48

## 2019-09-25 RX ADMIN — DIAZEPAM 5 MG: 5 TABLET ORAL at 22:15

## 2019-09-25 RX ADMIN — MORPHINE SULFATE 2 MG: 2 INJECTION, SOLUTION INTRAMUSCULAR; INTRAVENOUS at 03:07

## 2019-09-26 ENCOUNTER — APPOINTMENT (OUTPATIENT)
Dept: GENERAL RADIOLOGY | Facility: HOSPITAL | Age: 39
End: 2019-09-26

## 2019-09-26 LAB
BACTERIA SPEC AEROBE CULT: NO GROWTH
BACTERIA UR QL AUTO: ABNORMAL /HPF
BASOPHILS # BLD AUTO: 0.03 10*3/MM3 (ref 0–0.2)
BASOPHILS NFR BLD AUTO: 0.2 % (ref 0–1.5)
BILIRUB UR QL STRIP: NEGATIVE
CLARITY UR: CLEAR
COLOR UR: ABNORMAL
DEPRECATED RDW RBC AUTO: 44.4 FL (ref 37–54)
EOSINOPHIL # BLD AUTO: 0.88 10*3/MM3 (ref 0–0.4)
EOSINOPHIL NFR BLD AUTO: 5.6 % (ref 0.3–6.2)
ERYTHROCYTE [DISTWIDTH] IN BLOOD BY AUTOMATED COUNT: 13.6 % (ref 12.3–15.4)
GLUCOSE UR STRIP-MCNC: NEGATIVE MG/DL
GRAM STN SPEC: NORMAL
HCT VFR BLD AUTO: 40.2 % (ref 34–46.6)
HGB BLD-MCNC: 12.5 G/DL (ref 12–15.9)
HGB UR QL STRIP.AUTO: ABNORMAL
HYALINE CASTS UR QL AUTO: ABNORMAL /LPF
IMM GRANULOCYTES # BLD AUTO: 0.19 10*3/MM3 (ref 0–0.05)
IMM GRANULOCYTES NFR BLD AUTO: 1.2 % (ref 0–0.5)
KETONES UR QL STRIP: NEGATIVE
LEUKOCYTE ESTERASE UR QL STRIP.AUTO: NEGATIVE
LYMPHOCYTES # BLD AUTO: 1.32 10*3/MM3 (ref 0.7–3.1)
LYMPHOCYTES NFR BLD AUTO: 8.4 % (ref 19.6–45.3)
MCH RBC QN AUTO: 27.8 PG (ref 26.6–33)
MCHC RBC AUTO-ENTMCNC: 31.1 G/DL (ref 31.5–35.7)
MCV RBC AUTO: 89.3 FL (ref 79–97)
MONOCYTES # BLD AUTO: 0.77 10*3/MM3 (ref 0.1–0.9)
MONOCYTES NFR BLD AUTO: 4.9 % (ref 5–12)
NEUTROPHILS # BLD AUTO: 12.57 10*3/MM3 (ref 1.7–7)
NEUTROPHILS NFR BLD AUTO: 79.7 % (ref 42.7–76)
NITRITE UR QL STRIP: NEGATIVE
NRBC BLD AUTO-RTO: 0 /100 WBC (ref 0–0.2)
PH UR STRIP.AUTO: 8.5 [PH] (ref 5–8)
PLATELET # BLD AUTO: 646 10*3/MM3 (ref 140–450)
PMV BLD AUTO: 8.1 FL (ref 6–12)
PROT UR QL STRIP: NEGATIVE
RBC # BLD AUTO: 4.5 10*6/MM3 (ref 3.77–5.28)
RBC # UR: ABNORMAL /HPF
REF LAB TEST METHOD: ABNORMAL
SP GR UR STRIP: 1.01 (ref 1–1.03)
SQUAMOUS #/AREA URNS HPF: ABNORMAL /HPF
UROBILINOGEN UR QL STRIP: ABNORMAL
WBC NRBC COR # BLD: 15.76 10*3/MM3 (ref 3.4–10.8)
WBC UR QL AUTO: ABNORMAL /HPF

## 2019-09-26 PROCEDURE — 85025 COMPLETE CBC W/AUTO DIFF WBC: CPT | Performed by: INTERNAL MEDICINE

## 2019-09-26 PROCEDURE — 25010000002 ENOXAPARIN PER 10 MG: Performed by: COLON & RECTAL SURGERY

## 2019-09-26 PROCEDURE — 71045 X-RAY EXAM CHEST 1 VIEW: CPT

## 2019-09-26 PROCEDURE — 99232 SBSQ HOSP IP/OBS MODERATE 35: CPT | Performed by: NURSE PRACTITIONER

## 2019-09-26 PROCEDURE — 81001 URINALYSIS AUTO W/SCOPE: CPT | Performed by: NURSE PRACTITIONER

## 2019-09-26 PROCEDURE — 25010000002 PIPERACILLIN SOD-TAZOBACTAM PER 1 G: Performed by: INTERNAL MEDICINE

## 2019-09-26 PROCEDURE — 87040 BLOOD CULTURE FOR BACTERIA: CPT | Performed by: INTERNAL MEDICINE

## 2019-09-26 PROCEDURE — 25010000002 MORPHINE PER 10 MG: Performed by: COLON & RECTAL SURGERY

## 2019-09-26 RX ORDER — ACETAMINOPHEN 325 MG/1
650 TABLET ORAL EVERY 6 HOURS PRN
Status: DISCONTINUED | OUTPATIENT
Start: 2019-09-26 | End: 2019-09-30 | Stop reason: HOSPADM

## 2019-09-26 RX ADMIN — MORPHINE SULFATE 1 MG: 2 INJECTION, SOLUTION INTRAMUSCULAR; INTRAVENOUS at 12:08

## 2019-09-26 RX ADMIN — DIAZEPAM 5 MG: 5 TABLET ORAL at 17:53

## 2019-09-26 RX ADMIN — TAZOBACTAM SODIUM AND PIPERACILLIN SODIUM 4.5 G: 500; 4 INJECTION, SOLUTION INTRAVENOUS at 20:26

## 2019-09-26 RX ADMIN — DIAZEPAM 5 MG: 5 TABLET ORAL at 12:08

## 2019-09-26 RX ADMIN — DIAZEPAM 5 MG: 5 TABLET ORAL at 04:43

## 2019-09-26 RX ADMIN — OXYCODONE HYDROCHLORIDE 7.5 MG: 15 TABLET ORAL at 05:39

## 2019-09-26 RX ADMIN — OXYCODONE HYDROCHLORIDE 7.5 MG: 15 TABLET ORAL at 13:45

## 2019-09-26 RX ADMIN — ACETAMINOPHEN 650 MG: 325 TABLET ORAL at 19:06

## 2019-09-26 RX ADMIN — MORPHINE SULFATE 1 MG: 2 INJECTION, SOLUTION INTRAMUSCULAR; INTRAVENOUS at 09:44

## 2019-09-26 RX ADMIN — ENOXAPARIN SODIUM 40 MG: 40 INJECTION SUBCUTANEOUS at 08:29

## 2019-09-26 RX ADMIN — OXYCODONE HYDROCHLORIDE 7.5 MG: 15 TABLET ORAL at 23:30

## 2019-09-26 RX ADMIN — OXYCODONE HYDROCHLORIDE 7.5 MG: 15 TABLET ORAL at 09:43

## 2019-09-26 RX ADMIN — MORPHINE SULFATE 1 MG: 2 INJECTION, SOLUTION INTRAMUSCULAR; INTRAVENOUS at 16:57

## 2019-09-26 RX ADMIN — MORPHINE SULFATE 1 MG: 2 INJECTION, SOLUTION INTRAMUSCULAR; INTRAVENOUS at 20:30

## 2019-09-26 RX ADMIN — OXYCODONE HYDROCHLORIDE 7.5 MG: 15 TABLET ORAL at 19:05

## 2019-09-26 RX ADMIN — MORPHINE SULFATE 1 MG: 2 INJECTION, SOLUTION INTRAMUSCULAR; INTRAVENOUS at 02:47

## 2019-09-27 LAB
ALBUMIN SERPL-MCNC: 2.4 G/DL (ref 3.5–5.2)
ALBUMIN/GLOB SERPL: 0.6 G/DL
ALP SERPL-CCNC: 81 U/L (ref 39–117)
ALT SERPL W P-5'-P-CCNC: <5 U/L (ref 1–33)
ANION GAP SERPL CALCULATED.3IONS-SCNC: 9 MMOL/L (ref 5–15)
AST SERPL-CCNC: 10 U/L (ref 1–32)
BASOPHILS # BLD AUTO: 0.03 10*3/MM3 (ref 0–0.2)
BASOPHILS NFR BLD AUTO: 0.2 % (ref 0–1.5)
BILIRUB SERPL-MCNC: 0.3 MG/DL (ref 0.2–1.2)
BUN BLD-MCNC: 7 MG/DL (ref 6–20)
BUN/CREAT SERPL: 8.5 (ref 7–25)
CALCIUM SPEC-SCNC: 8.7 MG/DL (ref 8.6–10.5)
CHLORIDE SERPL-SCNC: 97 MMOL/L (ref 98–107)
CO2 SERPL-SCNC: 30 MMOL/L (ref 22–29)
CREAT BLD-MCNC: 0.82 MG/DL (ref 0.57–1)
DEPRECATED RDW RBC AUTO: 45.3 FL (ref 37–54)
EOSINOPHIL # BLD AUTO: 0.99 10*3/MM3 (ref 0–0.4)
EOSINOPHIL NFR BLD AUTO: 7.5 % (ref 0.3–6.2)
ERYTHROCYTE [DISTWIDTH] IN BLOOD BY AUTOMATED COUNT: 13.6 % (ref 12.3–15.4)
GFR SERPL CREATININE-BSD FRML MDRD: 77 ML/MIN/1.73
GLOBULIN UR ELPH-MCNC: 4.2 GM/DL
GLUCOSE BLD-MCNC: 178 MG/DL (ref 65–99)
HCT VFR BLD AUTO: 37.2 % (ref 34–46.6)
HGB BLD-MCNC: 11.5 G/DL (ref 12–15.9)
IMM GRANULOCYTES # BLD AUTO: 0.13 10*3/MM3 (ref 0–0.05)
IMM GRANULOCYTES NFR BLD AUTO: 1 % (ref 0–0.5)
LYMPHOCYTES # BLD AUTO: 1.21 10*3/MM3 (ref 0.7–3.1)
LYMPHOCYTES NFR BLD AUTO: 9.2 % (ref 19.6–45.3)
MCH RBC QN AUTO: 27.8 PG (ref 26.6–33)
MCHC RBC AUTO-ENTMCNC: 30.9 G/DL (ref 31.5–35.7)
MCV RBC AUTO: 90.1 FL (ref 79–97)
MONOCYTES # BLD AUTO: 0.77 10*3/MM3 (ref 0.1–0.9)
MONOCYTES NFR BLD AUTO: 5.9 % (ref 5–12)
NEUTROPHILS # BLD AUTO: 10.02 10*3/MM3 (ref 1.7–7)
NEUTROPHILS NFR BLD AUTO: 76.2 % (ref 42.7–76)
NRBC BLD AUTO-RTO: 0 /100 WBC (ref 0–0.2)
PLATELET # BLD AUTO: 616 10*3/MM3 (ref 140–450)
PMV BLD AUTO: 8.3 FL (ref 6–12)
POTASSIUM BLD-SCNC: 3.3 MMOL/L (ref 3.5–5.2)
PROT SERPL-MCNC: 6.6 G/DL (ref 6–8.5)
RBC # BLD AUTO: 4.13 10*6/MM3 (ref 3.77–5.28)
SODIUM BLD-SCNC: 136 MMOL/L (ref 136–145)
WBC NRBC COR # BLD: 13.15 10*3/MM3 (ref 3.4–10.8)

## 2019-09-27 PROCEDURE — 25010000002 ENOXAPARIN PER 10 MG: Performed by: COLON & RECTAL SURGERY

## 2019-09-27 PROCEDURE — 85025 COMPLETE CBC W/AUTO DIFF WBC: CPT | Performed by: NURSE PRACTITIONER

## 2019-09-27 PROCEDURE — 80053 COMPREHEN METABOLIC PANEL: CPT | Performed by: INTERNAL MEDICINE

## 2019-09-27 PROCEDURE — 25010000002 PIPERACILLIN SOD-TAZOBACTAM PER 1 G: Performed by: INTERNAL MEDICINE

## 2019-09-27 PROCEDURE — 25010000002 MORPHINE PER 10 MG: Performed by: COLON & RECTAL SURGERY

## 2019-09-27 PROCEDURE — 99232 SBSQ HOSP IP/OBS MODERATE 35: CPT | Performed by: INTERNAL MEDICINE

## 2019-09-27 RX ORDER — POTASSIUM CHLORIDE 1.5 G/1.77G
40 POWDER, FOR SOLUTION ORAL AS NEEDED
Status: DISCONTINUED | OUTPATIENT
Start: 2019-09-27 | End: 2019-09-30 | Stop reason: HOSPADM

## 2019-09-27 RX ORDER — POTASSIUM CHLORIDE 7.45 MG/ML
10 INJECTION INTRAVENOUS
Status: DISCONTINUED | OUTPATIENT
Start: 2019-09-27 | End: 2019-09-30 | Stop reason: HOSPADM

## 2019-09-27 RX ORDER — POTASSIUM CHLORIDE 750 MG/1
40 CAPSULE, EXTENDED RELEASE ORAL AS NEEDED
Status: DISCONTINUED | OUTPATIENT
Start: 2019-09-27 | End: 2019-09-30 | Stop reason: HOSPADM

## 2019-09-27 RX ADMIN — TAZOBACTAM SODIUM AND PIPERACILLIN SODIUM 4.5 G: 500; 4 INJECTION, SOLUTION INTRAVENOUS at 19:37

## 2019-09-27 RX ADMIN — DIAZEPAM 5 MG: 5 TABLET ORAL at 19:37

## 2019-09-27 RX ADMIN — MORPHINE SULFATE 1 MG: 2 INJECTION, SOLUTION INTRAMUSCULAR; INTRAVENOUS at 04:05

## 2019-09-27 RX ADMIN — DIAZEPAM 5 MG: 5 TABLET ORAL at 00:29

## 2019-09-27 RX ADMIN — OXYCODONE HYDROCHLORIDE 7.5 MG: 15 TABLET ORAL at 08:44

## 2019-09-27 RX ADMIN — MORPHINE SULFATE 1 MG: 2 INJECTION, SOLUTION INTRAMUSCULAR; INTRAVENOUS at 23:42

## 2019-09-27 RX ADMIN — MORPHINE SULFATE 1 MG: 2 INJECTION, SOLUTION INTRAMUSCULAR; INTRAVENOUS at 17:41

## 2019-09-27 RX ADMIN — TAZOBACTAM SODIUM AND PIPERACILLIN SODIUM 4.5 G: 500; 4 INJECTION, SOLUTION INTRAVENOUS at 10:14

## 2019-09-27 RX ADMIN — POTASSIUM CHLORIDE 40 MEQ: 750 CAPSULE, EXTENDED RELEASE ORAL at 19:37

## 2019-09-27 RX ADMIN — POTASSIUM CHLORIDE 40 MEQ: 750 CAPSULE, EXTENDED RELEASE ORAL at 23:42

## 2019-09-27 RX ADMIN — MORPHINE SULFATE 1 MG: 2 INJECTION, SOLUTION INTRAMUSCULAR; INTRAVENOUS at 11:35

## 2019-09-27 RX ADMIN — MORPHINE SULFATE 1 MG: 2 INJECTION, SOLUTION INTRAMUSCULAR; INTRAVENOUS at 21:04

## 2019-09-27 RX ADMIN — OXYCODONE HYDROCHLORIDE 7.5 MG: 15 TABLET ORAL at 14:24

## 2019-09-27 RX ADMIN — ACETAMINOPHEN 650 MG: 325 TABLET ORAL at 07:44

## 2019-09-27 RX ADMIN — OXYCODONE HYDROCHLORIDE 7.5 MG: 15 TABLET ORAL at 19:29

## 2019-09-27 RX ADMIN — TAZOBACTAM SODIUM AND PIPERACILLIN SODIUM 4.5 G: 500; 4 INJECTION, SOLUTION INTRAVENOUS at 04:06

## 2019-09-27 RX ADMIN — DIAZEPAM 5 MG: 5 TABLET ORAL at 10:14

## 2019-09-27 RX ADMIN — ENOXAPARIN SODIUM 40 MG: 40 INJECTION SUBCUTANEOUS at 07:44

## 2019-09-27 RX ADMIN — OXYCODONE HYDROCHLORIDE 7.5 MG: 15 TABLET ORAL at 23:42

## 2019-09-28 PROBLEM — R50.82 POSTOPERATIVE FEVER: Status: ACTIVE | Noted: 2019-09-28

## 2019-09-28 LAB
ANION GAP SERPL CALCULATED.3IONS-SCNC: 12 MMOL/L (ref 5–15)
BACTERIA SPEC ANAEROBE CULT: NORMAL
BASOPHILS # BLD AUTO: 0.04 10*3/MM3 (ref 0–0.2)
BASOPHILS NFR BLD AUTO: 0.4 % (ref 0–1.5)
BUN BLD-MCNC: 6 MG/DL (ref 6–20)
BUN/CREAT SERPL: 8.5 (ref 7–25)
CALCIUM SPEC-SCNC: 8.5 MG/DL (ref 8.6–10.5)
CHLORIDE SERPL-SCNC: 97 MMOL/L (ref 98–107)
CO2 SERPL-SCNC: 25 MMOL/L (ref 22–29)
CREAT BLD-MCNC: 0.71 MG/DL (ref 0.57–1)
DEPRECATED RDW RBC AUTO: 46 FL (ref 37–54)
EOSINOPHIL # BLD AUTO: 0.65 10*3/MM3 (ref 0–0.4)
EOSINOPHIL NFR BLD AUTO: 5.8 % (ref 0.3–6.2)
ERYTHROCYTE [DISTWIDTH] IN BLOOD BY AUTOMATED COUNT: 13.7 % (ref 12.3–15.4)
GFR SERPL CREATININE-BSD FRML MDRD: 91 ML/MIN/1.73
GLUCOSE BLD-MCNC: 114 MG/DL (ref 65–99)
HCT VFR BLD AUTO: 36.2 % (ref 34–46.6)
HGB BLD-MCNC: 11.3 G/DL (ref 12–15.9)
IMM GRANULOCYTES # BLD AUTO: 0.1 10*3/MM3 (ref 0–0.05)
IMM GRANULOCYTES NFR BLD AUTO: 0.9 % (ref 0–0.5)
LYMPHOCYTES # BLD AUTO: 1.37 10*3/MM3 (ref 0.7–3.1)
LYMPHOCYTES NFR BLD AUTO: 12.3 % (ref 19.6–45.3)
MCH RBC QN AUTO: 28.3 PG (ref 26.6–33)
MCHC RBC AUTO-ENTMCNC: 31.2 G/DL (ref 31.5–35.7)
MCV RBC AUTO: 90.7 FL (ref 79–97)
MONOCYTES # BLD AUTO: 0.64 10*3/MM3 (ref 0.1–0.9)
MONOCYTES NFR BLD AUTO: 5.8 % (ref 5–12)
NEUTROPHILS # BLD AUTO: 8.33 10*3/MM3 (ref 1.7–7)
NEUTROPHILS NFR BLD AUTO: 74.8 % (ref 42.7–76)
NRBC BLD AUTO-RTO: 0 /100 WBC (ref 0–0.2)
PLATELET # BLD AUTO: 616 10*3/MM3 (ref 140–450)
PMV BLD AUTO: 8.3 FL (ref 6–12)
POTASSIUM BLD-SCNC: 4.1 MMOL/L (ref 3.5–5.2)
POTASSIUM BLD-SCNC: 4.1 MMOL/L (ref 3.5–5.2)
RBC # BLD AUTO: 3.99 10*6/MM3 (ref 3.77–5.28)
SODIUM BLD-SCNC: 134 MMOL/L (ref 136–145)
WBC NRBC COR # BLD: 11.13 10*3/MM3 (ref 3.4–10.8)

## 2019-09-28 PROCEDURE — 85025 COMPLETE CBC W/AUTO DIFF WBC: CPT | Performed by: INTERNAL MEDICINE

## 2019-09-28 PROCEDURE — 94799 UNLISTED PULMONARY SVC/PX: CPT

## 2019-09-28 PROCEDURE — 84132 ASSAY OF SERUM POTASSIUM: CPT | Performed by: PHYSICIAN ASSISTANT

## 2019-09-28 PROCEDURE — 25010000002 MORPHINE PER 10 MG: Performed by: COLON & RECTAL SURGERY

## 2019-09-28 PROCEDURE — 99232 SBSQ HOSP IP/OBS MODERATE 35: CPT | Performed by: INTERNAL MEDICINE

## 2019-09-28 PROCEDURE — 25010000002 ENOXAPARIN PER 10 MG: Performed by: COLON & RECTAL SURGERY

## 2019-09-28 PROCEDURE — 25010000002 PIPERACILLIN SOD-TAZOBACTAM PER 1 G: Performed by: INTERNAL MEDICINE

## 2019-09-28 PROCEDURE — 80048 BASIC METABOLIC PNL TOTAL CA: CPT | Performed by: INTERNAL MEDICINE

## 2019-09-28 RX ADMIN — DIAZEPAM 5 MG: 5 TABLET ORAL at 23:09

## 2019-09-28 RX ADMIN — DIAZEPAM 5 MG: 5 TABLET ORAL at 11:16

## 2019-09-28 RX ADMIN — ENOXAPARIN SODIUM 40 MG: 40 INJECTION SUBCUTANEOUS at 09:12

## 2019-09-28 RX ADMIN — OXYCODONE HYDROCHLORIDE 7.5 MG: 15 TABLET ORAL at 20:29

## 2019-09-28 RX ADMIN — DIAZEPAM 5 MG: 5 TABLET ORAL at 03:11

## 2019-09-28 RX ADMIN — OXYCODONE HYDROCHLORIDE 7.5 MG: 15 TABLET ORAL at 16:23

## 2019-09-28 RX ADMIN — MORPHINE SULFATE 1 MG: 2 INJECTION, SOLUTION INTRAMUSCULAR; INTRAVENOUS at 14:47

## 2019-09-28 RX ADMIN — OXYCODONE HYDROCHLORIDE 7.5 MG: 15 TABLET ORAL at 11:09

## 2019-09-28 RX ADMIN — MORPHINE SULFATE 1 MG: 2 INJECTION, SOLUTION INTRAMUSCULAR; INTRAVENOUS at 03:32

## 2019-09-28 RX ADMIN — MORPHINE SULFATE 1 MG: 2 INJECTION, SOLUTION INTRAMUSCULAR; INTRAVENOUS at 23:09

## 2019-09-28 RX ADMIN — MORPHINE SULFATE 1 MG: 2 INJECTION, SOLUTION INTRAMUSCULAR; INTRAVENOUS at 09:09

## 2019-09-28 RX ADMIN — MORPHINE SULFATE 1 MG: 2 INJECTION, SOLUTION INTRAMUSCULAR; INTRAVENOUS at 18:52

## 2019-09-28 RX ADMIN — MORPHINE SULFATE 1 MG: 2 INJECTION, SOLUTION INTRAMUSCULAR; INTRAVENOUS at 21:04

## 2019-09-28 RX ADMIN — MORPHINE SULFATE 1 MG: 2 INJECTION, SOLUTION INTRAMUSCULAR; INTRAVENOUS at 16:29

## 2019-09-28 RX ADMIN — OXYCODONE HYDROCHLORIDE 7.5 MG: 15 TABLET ORAL at 06:32

## 2019-09-28 RX ADMIN — TAZOBACTAM SODIUM AND PIPERACILLIN SODIUM 4.5 G: 500; 4 INJECTION, SOLUTION INTRAVENOUS at 03:11

## 2019-09-29 LAB
ANION GAP SERPL CALCULATED.3IONS-SCNC: 11 MMOL/L (ref 5–15)
BASOPHILS # BLD AUTO: 0.03 10*3/MM3 (ref 0–0.2)
BASOPHILS NFR BLD AUTO: 0.3 % (ref 0–1.5)
BUN BLD-MCNC: 6 MG/DL (ref 6–20)
BUN/CREAT SERPL: 8.8 (ref 7–25)
CALCIUM SPEC-SCNC: 9.2 MG/DL (ref 8.6–10.5)
CHLORIDE SERPL-SCNC: 96 MMOL/L (ref 98–107)
CO2 SERPL-SCNC: 29 MMOL/L (ref 22–29)
CREAT BLD-MCNC: 0.68 MG/DL (ref 0.57–1)
DEPRECATED RDW RBC AUTO: 44.9 FL (ref 37–54)
EOSINOPHIL # BLD AUTO: 0.51 10*3/MM3 (ref 0–0.4)
EOSINOPHIL NFR BLD AUTO: 5.1 % (ref 0.3–6.2)
ERYTHROCYTE [DISTWIDTH] IN BLOOD BY AUTOMATED COUNT: 13.8 % (ref 12.3–15.4)
GFR SERPL CREATININE-BSD FRML MDRD: 96 ML/MIN/1.73
GLUCOSE BLD-MCNC: 104 MG/DL (ref 65–99)
HCT VFR BLD AUTO: 39.4 % (ref 34–46.6)
HGB BLD-MCNC: 12.2 G/DL (ref 12–15.9)
IMM GRANULOCYTES # BLD AUTO: 0.06 10*3/MM3 (ref 0–0.05)
IMM GRANULOCYTES NFR BLD AUTO: 0.6 % (ref 0–0.5)
LYMPHOCYTES # BLD AUTO: 1.11 10*3/MM3 (ref 0.7–3.1)
LYMPHOCYTES NFR BLD AUTO: 11 % (ref 19.6–45.3)
MCH RBC QN AUTO: 27.5 PG (ref 26.6–33)
MCHC RBC AUTO-ENTMCNC: 31 G/DL (ref 31.5–35.7)
MCV RBC AUTO: 88.7 FL (ref 79–97)
MONOCYTES # BLD AUTO: 0.83 10*3/MM3 (ref 0.1–0.9)
MONOCYTES NFR BLD AUTO: 8.2 % (ref 5–12)
NEUTROPHILS # BLD AUTO: 7.55 10*3/MM3 (ref 1.7–7)
NEUTROPHILS NFR BLD AUTO: 74.8 % (ref 42.7–76)
NRBC BLD AUTO-RTO: 0 /100 WBC (ref 0–0.2)
PLATELET # BLD AUTO: 691 10*3/MM3 (ref 140–450)
PMV BLD AUTO: 8.4 FL (ref 6–12)
POTASSIUM BLD-SCNC: 4.1 MMOL/L (ref 3.5–5.2)
RBC # BLD AUTO: 4.44 10*6/MM3 (ref 3.77–5.28)
SODIUM BLD-SCNC: 136 MMOL/L (ref 136–145)
WBC NRBC COR # BLD: 10.09 10*3/MM3 (ref 3.4–10.8)

## 2019-09-29 PROCEDURE — 25010000002 MORPHINE PER 10 MG: Performed by: COLON & RECTAL SURGERY

## 2019-09-29 PROCEDURE — 99232 SBSQ HOSP IP/OBS MODERATE 35: CPT | Performed by: NURSE PRACTITIONER

## 2019-09-29 PROCEDURE — 85025 COMPLETE CBC W/AUTO DIFF WBC: CPT | Performed by: COLON & RECTAL SURGERY

## 2019-09-29 PROCEDURE — 25010000002 ENOXAPARIN PER 10 MG: Performed by: COLON & RECTAL SURGERY

## 2019-09-29 PROCEDURE — 25010000002 HYDROCORTISONE SODIUM SUCCINATE 100 MG RECONSTITUTED SOLUTION: Performed by: COLON & RECTAL SURGERY

## 2019-09-29 PROCEDURE — 80048 BASIC METABOLIC PNL TOTAL CA: CPT | Performed by: COLON & RECTAL SURGERY

## 2019-09-29 PROCEDURE — 25010000002 ONDANSETRON PER 1 MG: Performed by: COLON & RECTAL SURGERY

## 2019-09-29 PROCEDURE — 94799 UNLISTED PULMONARY SVC/PX: CPT

## 2019-09-29 RX ORDER — SODIUM CHLORIDE 9 MG/ML
75 INJECTION, SOLUTION INTRAVENOUS CONTINUOUS
Status: DISCONTINUED | OUTPATIENT
Start: 2019-09-29 | End: 2019-09-30

## 2019-09-29 RX ORDER — PREDNISONE 20 MG/1
20 TABLET ORAL
Status: DISCONTINUED | OUTPATIENT
Start: 2019-09-30 | End: 2019-09-30 | Stop reason: HOSPADM

## 2019-09-29 RX ORDER — PANTOPRAZOLE SODIUM 40 MG/1
40 TABLET, DELAYED RELEASE ORAL
Status: DISCONTINUED | OUTPATIENT
Start: 2019-09-29 | End: 2019-09-30 | Stop reason: HOSPADM

## 2019-09-29 RX ADMIN — SODIUM CHLORIDE 75 ML/HR: 9 INJECTION, SOLUTION INTRAVENOUS at 15:20

## 2019-09-29 RX ADMIN — OXYCODONE HYDROCHLORIDE 7.5 MG: 15 TABLET ORAL at 11:53

## 2019-09-29 RX ADMIN — ONDANSETRON 4 MG: 2 INJECTION INTRAMUSCULAR; INTRAVENOUS at 00:49

## 2019-09-29 RX ADMIN — MORPHINE SULFATE 1 MG: 2 INJECTION, SOLUTION INTRAMUSCULAR; INTRAVENOUS at 17:12

## 2019-09-29 RX ADMIN — HYDROCORTISONE SODIUM SUCCINATE 50 MG: 100 INJECTION, POWDER, FOR SOLUTION INTRAMUSCULAR; INTRAVENOUS at 11:52

## 2019-09-29 RX ADMIN — OXYCODONE HYDROCHLORIDE 7.5 MG: 15 TABLET ORAL at 07:02

## 2019-09-29 RX ADMIN — DIAZEPAM 5 MG: 5 TABLET ORAL at 08:17

## 2019-09-29 RX ADMIN — MORPHINE SULFATE 1 MG: 2 INJECTION, SOLUTION INTRAMUSCULAR; INTRAVENOUS at 08:17

## 2019-09-29 RX ADMIN — MORPHINE SULFATE 1 MG: 2 INJECTION, SOLUTION INTRAMUSCULAR; INTRAVENOUS at 04:38

## 2019-09-29 RX ADMIN — OXYCODONE HYDROCHLORIDE 7.5 MG: 15 TABLET ORAL at 00:44

## 2019-09-29 RX ADMIN — PANTOPRAZOLE SODIUM 40 MG: 40 TABLET, DELAYED RELEASE ORAL at 11:52

## 2019-09-29 RX ADMIN — MORPHINE SULFATE 1 MG: 2 INJECTION, SOLUTION INTRAMUSCULAR; INTRAVENOUS at 13:02

## 2019-09-29 RX ADMIN — ENOXAPARIN SODIUM 40 MG: 40 INJECTION SUBCUTANEOUS at 08:17

## 2019-09-30 VITALS
BODY MASS INDEX: 22.88 KG/M2 | TEMPERATURE: 97.9 F | SYSTOLIC BLOOD PRESSURE: 107 MMHG | WEIGHT: 151 LBS | OXYGEN SATURATION: 96 % | DIASTOLIC BLOOD PRESSURE: 70 MMHG | HEART RATE: 75 BPM | HEIGHT: 68 IN | RESPIRATION RATE: 18 BRPM

## 2019-09-30 PROBLEM — R50.82 POSTOPERATIVE FEVER: Status: RESOLVED | Noted: 2019-09-28 | Resolved: 2019-09-30

## 2019-09-30 LAB
ALBUMIN SERPL-MCNC: 2.5 G/DL (ref 3.5–5.2)
ALBUMIN/GLOB SERPL: 0.7 G/DL
ALP SERPL-CCNC: 71 U/L (ref 39–117)
ALT SERPL W P-5'-P-CCNC: <5 U/L (ref 1–33)
ANION GAP SERPL CALCULATED.3IONS-SCNC: 11 MMOL/L (ref 5–15)
AST SERPL-CCNC: 7 U/L (ref 1–32)
BASOPHILS # BLD AUTO: 0.01 10*3/MM3 (ref 0–0.2)
BASOPHILS NFR BLD AUTO: 0.2 % (ref 0–1.5)
BILIRUB SERPL-MCNC: 0.3 MG/DL (ref 0.2–1.2)
BUN BLD-MCNC: 8 MG/DL (ref 6–20)
BUN/CREAT SERPL: 12.3 (ref 7–25)
CALCIUM SPEC-SCNC: 9 MG/DL (ref 8.6–10.5)
CHLORIDE SERPL-SCNC: 97 MMOL/L (ref 98–107)
CO2 SERPL-SCNC: 28 MMOL/L (ref 22–29)
CREAT BLD-MCNC: 0.65 MG/DL (ref 0.57–1)
DEPRECATED RDW RBC AUTO: 43.9 FL (ref 37–54)
EOSINOPHIL # BLD AUTO: 0.46 10*3/MM3 (ref 0–0.4)
EOSINOPHIL NFR BLD AUTO: 7 % (ref 0.3–6.2)
ERYTHROCYTE [DISTWIDTH] IN BLOOD BY AUTOMATED COUNT: 13.4 % (ref 12.3–15.4)
GFR SERPL CREATININE-BSD FRML MDRD: 101 ML/MIN/1.73
GLOBULIN UR ELPH-MCNC: 3.7 GM/DL
GLUCOSE BLD-MCNC: 118 MG/DL (ref 65–99)
HCT VFR BLD AUTO: 34.7 % (ref 34–46.6)
HGB BLD-MCNC: 10.9 G/DL (ref 12–15.9)
IMM GRANULOCYTES # BLD AUTO: 0.06 10*3/MM3 (ref 0–0.05)
IMM GRANULOCYTES NFR BLD AUTO: 0.9 % (ref 0–0.5)
LYMPHOCYTES # BLD AUTO: 1.2 10*3/MM3 (ref 0.7–3.1)
LYMPHOCYTES NFR BLD AUTO: 18.2 % (ref 19.6–45.3)
MCH RBC QN AUTO: 27.7 PG (ref 26.6–33)
MCHC RBC AUTO-ENTMCNC: 31.4 G/DL (ref 31.5–35.7)
MCV RBC AUTO: 88.3 FL (ref 79–97)
MONOCYTES # BLD AUTO: 0.59 10*3/MM3 (ref 0.1–0.9)
MONOCYTES NFR BLD AUTO: 9 % (ref 5–12)
NEUTROPHILS # BLD AUTO: 4.26 10*3/MM3 (ref 1.7–7)
NEUTROPHILS NFR BLD AUTO: 64.7 % (ref 42.7–76)
NRBC BLD AUTO-RTO: 0 /100 WBC (ref 0–0.2)
PLATELET # BLD AUTO: 660 10*3/MM3 (ref 140–450)
PMV BLD AUTO: 8.4 FL (ref 6–12)
POTASSIUM BLD-SCNC: 3.5 MMOL/L (ref 3.5–5.2)
PROT SERPL-MCNC: 6.2 G/DL (ref 6–8.5)
RBC # BLD AUTO: 3.93 10*6/MM3 (ref 3.77–5.28)
SODIUM BLD-SCNC: 136 MMOL/L (ref 136–145)
WBC NRBC COR # BLD: 6.58 10*3/MM3 (ref 3.4–10.8)

## 2019-09-30 PROCEDURE — G0008 ADMIN INFLUENZA VIRUS VAC: HCPCS | Performed by: COLON & RECTAL SURGERY

## 2019-09-30 PROCEDURE — 25010000002 INFLUENZA VAC SUBUNIT QUAD 0.5 ML SUSPENSION PREFILLED SYRINGE: Performed by: COLON & RECTAL SURGERY

## 2019-09-30 PROCEDURE — 85025 COMPLETE CBC W/AUTO DIFF WBC: CPT | Performed by: COLON & RECTAL SURGERY

## 2019-09-30 PROCEDURE — 99239 HOSP IP/OBS DSCHRG MGMT >30: CPT | Performed by: NURSE PRACTITIONER

## 2019-09-30 PROCEDURE — 25010000002 MORPHINE PER 10 MG: Performed by: COLON & RECTAL SURGERY

## 2019-09-30 PROCEDURE — 90674 CCIIV4 VAC NO PRSV 0.5 ML IM: CPT | Performed by: COLON & RECTAL SURGERY

## 2019-09-30 PROCEDURE — 63710000001 PREDNISONE PER 1 MG: Performed by: COLON & RECTAL SURGERY

## 2019-09-30 PROCEDURE — 80053 COMPREHEN METABOLIC PANEL: CPT | Performed by: COLON & RECTAL SURGERY

## 2019-09-30 PROCEDURE — 25010000002 ENOXAPARIN PER 10 MG: Performed by: COLON & RECTAL SURGERY

## 2019-09-30 RX ORDER — PANTOPRAZOLE SODIUM 40 MG/1
40 TABLET, DELAYED RELEASE ORAL DAILY
Qty: 30 TABLET | Refills: 0 | Status: SHIPPED | OUTPATIENT
Start: 2019-09-30

## 2019-09-30 RX ORDER — DIAZEPAM 5 MG/1
5 TABLET ORAL EVERY 6 HOURS PRN
Qty: 10 TABLET | Refills: 0 | Status: SHIPPED | OUTPATIENT
Start: 2019-09-30 | End: 2019-10-03

## 2019-09-30 RX ORDER — OXYCODONE HYDROCHLORIDE 5 MG/1
5 TABLET ORAL EVERY 6 HOURS PRN
Qty: 12 TABLET | Refills: 0 | Status: SHIPPED | OUTPATIENT
Start: 2019-09-30

## 2019-09-30 RX ORDER — PREDNISONE 10 MG/1
TABLET ORAL
Start: 2019-09-30 | End: 2020-07-24 | Stop reason: HOSPADM

## 2019-09-30 RX ORDER — ACETAMINOPHEN 325 MG/1
650 TABLET ORAL EVERY 6 HOURS PRN
Start: 2019-09-30

## 2019-09-30 RX ORDER — OXYCODONE HYDROCHLORIDE 5 MG/1
5 TABLET ORAL EVERY 4 HOURS PRN
Status: DISCONTINUED | OUTPATIENT
Start: 2019-09-30 | End: 2019-09-30 | Stop reason: HOSPADM

## 2019-09-30 RX ADMIN — OXYCODONE HYDROCHLORIDE 5 MG: 5 TABLET ORAL at 17:31

## 2019-09-30 RX ADMIN — MORPHINE SULFATE 1 MG: 2 INJECTION, SOLUTION INTRAMUSCULAR; INTRAVENOUS at 11:12

## 2019-09-30 RX ADMIN — PREDNISONE 20 MG: 20 TABLET ORAL at 08:08

## 2019-09-30 RX ADMIN — MORPHINE SULFATE 1 MG: 2 INJECTION, SOLUTION INTRAMUSCULAR; INTRAVENOUS at 08:07

## 2019-09-30 RX ADMIN — PANTOPRAZOLE SODIUM 40 MG: 40 TABLET, DELAYED RELEASE ORAL at 05:54

## 2019-09-30 RX ADMIN — DIAZEPAM 5 MG: 5 TABLET ORAL at 00:08

## 2019-09-30 RX ADMIN — MORPHINE SULFATE 1 MG: 2 INJECTION, SOLUTION INTRAMUSCULAR; INTRAVENOUS at 15:33

## 2019-09-30 RX ADMIN — MORPHINE SULFATE 1 MG: 2 INJECTION, SOLUTION INTRAMUSCULAR; INTRAVENOUS at 05:02

## 2019-09-30 RX ADMIN — OXYCODONE HYDROCHLORIDE 7.5 MG: 15 TABLET ORAL at 12:48

## 2019-09-30 RX ADMIN — MORPHINE SULFATE 1 MG: 2 INJECTION, SOLUTION INTRAMUSCULAR; INTRAVENOUS at 00:08

## 2019-09-30 RX ADMIN — ENOXAPARIN SODIUM 40 MG: 40 INJECTION SUBCUTANEOUS at 08:08

## 2019-09-30 RX ADMIN — INFLUENZA A VIRUS A/IDAHO/07/2018 (H1N1) ANTIGEN (MDCK CELL DERIVED, PROPIOLACTONE INACTIVATED, INFLUENZA A VIRUS A/INDIANA/08/2018 (H3N2) ANTIGEN (MDCK CELL DERIVED, PROPIOLACTONE INACTIVATED), INFLUENZA B VIRUS B/SINGAPORE/INFTT-16-0610/2016 ANTIGEN (MDCK CELL DERIVED, PROPIOLACTONE INACTIVATED), INFLUENZA B VIRUS B/IOWA/06/2017 ANTIGEN (MDCK CELL DERIVED, PROPIOLACTONE INACTIVATED) 0.5 ML: 15; 15; 15; 15 INJECTION, SUSPENSION INTRAMUSCULAR at 17:31

## 2019-09-30 RX ADMIN — OXYCODONE HYDROCHLORIDE 7.5 MG: 15 TABLET ORAL at 00:07

## 2019-10-01 ENCOUNTER — READMISSION MANAGEMENT (OUTPATIENT)
Dept: CALL CENTER | Facility: HOSPITAL | Age: 39
End: 2019-10-01

## 2019-10-01 LAB
BACTERIA SPEC AEROBE CULT: NORMAL
BACTERIA SPEC AEROBE CULT: NORMAL

## 2019-10-01 NOTE — OUTREACH NOTE
Prep Survey      Responses   Facility patient discharged from?  [facility]   Is patient eligible?  Yes   Discharge diagnosis  Ulcerative colitis, anemia of chronic disease, immunocompromised,s/p Ileostomy sigmoid colectomy    Does the patient have one of the following disease processes/diagnoses(primary or secondary)?  General Surgery   Does the patient have Home health ordered?  Yes   What is the Home health agency?   [facility]   Is there a DME ordered?  No   Comments regarding appointments  See AVS   Prep survey completed?  Yes          Catalina Ortiz RN

## 2019-10-02 ENCOUNTER — TELEPHONE (OUTPATIENT)
Dept: WOUND CARE | Facility: HOSPITAL | Age: 39
End: 2019-10-02

## 2019-10-02 NOTE — TELEPHONE ENCOUNTER
Received 3 phone calls to regarding ostomy appliance leaking.  Called and spoke with patient.  Patient stated output was pouring out and she didn't think she was able to get a good seal. I instructed the patient to take Imodium 2 now and add 1 until output is pudding consistency.   Max of 6 imodium's a day.  Also suggested patient change appliance before next meal to possibly decrease output.  Patient stated she needs a couple extra appliances, will leave some for her.  Also offered to set up an out patient visit, patient denied at this time.  Patient to follow up with [facility] if needed. Thank you

## 2019-10-03 ENCOUNTER — READMISSION MANAGEMENT (OUTPATIENT)
Dept: CALL CENTER | Facility: HOSPITAL | Age: 39
End: 2019-10-03

## 2019-10-03 NOTE — OUTREACH NOTE
General Surgery Week 1 Survey      Responses   Facility patient discharged from?  [facility]   Does the patient have one of the following disease processes/diagnoses(primary or secondary)?  General Surgery   Is there a successful TCM telephone encounter documented?  No   Week 1 attempt successful?  No   Unsuccessful attempts  Attempt 1          Jeri Mon RN

## 2019-10-04 ENCOUNTER — READMISSION MANAGEMENT (OUTPATIENT)
Dept: CALL CENTER | Facility: HOSPITAL | Age: 39
End: 2019-10-04

## 2019-10-04 NOTE — OUTREACH NOTE
General Surgery Week 1 Survey      Responses   Facility patient discharged from?  [facility]   Does the patient have one of the following disease processes/diagnoses(primary or secondary)?  General Surgery   Is there a successful TCM telephone encounter documented?  No   Week 1 attempt successful?  Yes   Call start time  1046   Call end time  1049   Discharge diagnosis  Ulcerative colitis, anemia of chronic disease, immunocompromised,s/p Ileostomy sigmoid colectomy    Is patient permission given to speak with other caregiver?  No   List who call center can speak with  self    Meds reviewed with patient/caregiver?  Yes   Is the patient having any side effects they believe may be caused by any medication additions or changes?  No   Does the patient have all medications related to this admission filled (includes all antibiotics, pain medications, etc.)  Yes   Is the patient taking all medications as directed (includes completed medication regime)?  Yes   Does the patient have a follow up appointment scheduled with their surgeon?  Yes   Has the patient kept scheduled appointments due by today?  N/A   What is the Home health agency?   [facility]   Has home health visited the patient within 72 hours of discharge?  Unsure   Psychosocial issues?  No   Did the patient receive a copy of their discharge instructions?  Yes   Nursing interventions  Reviewed instructions with patient, Educated on MyChart   What is the patient's perception of their health status since discharge?  Same   Nursing interventions  Nurse provided patient education   Is the patient /caregiver able to teach back basic post-op care?  Continue use of incentive spirometry at least 1 week post discharge, Take showers only when approved by MD-sponge bathamelie until then, Do not remove steri-strips, Lifting as instructed by MD in discharge instructions, Keep incision areas clean,dry and protected   Is the patient/caregiver able to teach back signs and symptoms of  incisional infection?  Increased redness, swelling or pain at the incisonal site, Increased drainage or bleeding, Incisional warmth, Pus or odor from incision, Fever   Is the patient/caregiver able to teach back steps to recovery at home?  Set small, achievable goals for return to baseline health, Rest and rebuild strength, gradually increase activity, Weigh daily, Practice good oral hygiene, Eat a well-balance diet, Make a list of questions for surgeon's appointment   Is the patient/caregiver able to teach back the hierarchy of who to call/visit for symptoms/problems? PCP, Specialist, Home health nurse, Urgent Care, ED, 911  Yes   Week 1 call completed?  Yes   Wrap up additional comments  -- [She was in a hurry the call was cut short.]          Dolores Echeverria RN

## 2019-10-08 ENCOUNTER — TELEPHONE (OUTPATIENT)
Dept: WOUND CARE | Facility: HOSPITAL | Age: 39
End: 2019-10-08

## 2019-10-08 NOTE — TELEPHONE ENCOUNTER
From sultana :   Hi. I’ve been getting home health. I’m assuming you have access to the notes. If. It let me know and I can give you details. Had some ups and downs. Just wanted to share photos from today. I will sending Them in a separate message.  I had the staples taken out.  Do you have any samples of support bands? My goal is to return to work next week and I’m trying to make it less noticeable. Also It’s still really helpful to hold my stomach when I’m up and walking and I think any additional compression would be helpful.  Thanks,  Sultana Siegel  [phone]                    My email response:   Sultana    Your stoma looks good, just a small amount of separation next to the stoma.  The incision has some staple irritation.  Clean it with soap and water during your shower when possible.  You can apply some antibiotic ointment a couple of times a day.    Sorry but we do not have any samples of support bands.  Are you using a ostomy belt?   Sometimes that helps with feeling more secure.  The one drawback is not all the appliances have belt tabs.  What kind of appliance are you using?      I am so proud of you for returning to work.  You will be very tired the first couple of days, sometimes it's just from sitting, using those core muscles that are still healing.     I have a patient that wears a snug long tank top, she says it helps.  However she is not as recent post op as you are.      You can buy a support band.  [employer]      [email or web address]://www.ostomysecrets.com/    Let me know how it goes.        Thank you,    Jackeline Thrasher MSN RN CWOCN  Wound Ostomy and Continence Nurse  [address] [address]  [phone]  [email or web address]

## 2019-10-14 ENCOUNTER — READMISSION MANAGEMENT (OUTPATIENT)
Dept: CALL CENTER | Facility: HOSPITAL | Age: 39
End: 2019-10-14

## 2019-10-14 NOTE — OUTREACH NOTE
General Surgery Week 2 Survey      Responses   Facility patient discharged from?  [place]   Does the patient have one of the following disease processes/diagnoses(primary or secondary)?  General Surgery   Week 2 attempt successful?  Yes   Call start time  1112   Call end time  1121   Discharge diagnosis  Ulcerative colitis, anemia of chronic disease, immunocompromised,s/p Ileostomy sigmoid colectomy    Meds reviewed with patient/caregiver?  Yes   Is the patient having any side effects they believe may be caused by any medication additions or changes?  No   Does the patient have all medications related to this admission filled (includes all antibiotics, pain medications, etc.)  Yes   Is the patient taking all medications as directed (includes completed medication regime)?  Yes   Does the patient have a follow up appointment scheduled with their surgeon?  Yes   Has the patient kept scheduled appointments due by today?  Yes   Comments  Andrae Diaz on thursday    What is the Home health agency?   [facility]   Has home health visited the patient within 72 hours of discharge?  Yes   Psychosocial issues?  No   Did the patient receive a copy of their discharge instructions?  Yes   Nursing interventions  Reviewed instructions with patient   What is the patient's perception of their health status since discharge?  Same   Nursing interventions  Nurse provided patient education   Is the patient /caregiver able to teach back basic post-op care?  Continue use of incentive spirometry at least 1 week post discharge, Take showers only when approved by MD-sponge bathe until then, Do not remove steri-strips, Lifting as instructed by MD in discharge instructions, Keep incision areas clean,dry and protected   Is the patient/caregiver able to teach back signs and symptoms of incisional infection?  Increased redness, swelling or pain at the incisonal site, Increased drainage or bleeding, Incisional warmth, Pus or odor from incision, Fever   Is  the patient/caregiver able to teach back steps to recovery at home?  Set small, achievable goals for return to baseline health, Rest and rebuild strength, gradually increase activity, Weigh daily, Practice good oral hygiene, Eat a well-balance diet, Make a list of questions for surgeon's appointment   Is the patient/caregiver able to teach back the hierarchy of who to call/visit for symptoms/problems? PCP, Specialist, Home health nurse, Urgent Care, ED, 911  Yes   Week 2 call completed?  Yes          Kathi Cho, RN

## 2019-10-22 ENCOUNTER — READMISSION MANAGEMENT (OUTPATIENT)
Dept: CALL CENTER | Facility: HOSPITAL | Age: 39
End: 2019-10-22

## 2019-10-22 NOTE — OUTREACH NOTE
General Surgery Week 3 Survey      Responses   Facility patient discharged from?  [place]   Does the patient have one of the following disease processes/diagnoses(primary or secondary)?  General Surgery   Week 3 attempt successful?  No   Unsuccessful attempts  Attempt 1          Kathi Cho RN

## 2019-10-23 ENCOUNTER — READMISSION MANAGEMENT (OUTPATIENT)
Dept: CALL CENTER | Facility: HOSPITAL | Age: 39
End: 2019-10-23

## 2019-10-23 NOTE — OUTREACH NOTE
General Surgery Week 3 Survey      Responses   Facility patient discharged from?  [place]   Does the patient have one of the following disease processes/diagnoses(primary or secondary)?  General Surgery   Week 3 attempt successful?  Yes   Call start time  1202   Call end time  1203   Discharge diagnosis  Ulcerative colitis, anemia of chronic disease, immunocompromised,s/p Ileostomy sigmoid colectomy    Is the patient taking all medications as directed (includes completed medication regime)?  Yes   Has the patient kept scheduled appointments due by today?  Yes   What is the patient's perception of their health status since discharge?  Improving   Additional teach back comments  Pt says she is doing well, no questions or concerns at this time.   Week 3 call completed?  Yes   Revoked  No further contact(revokes)-requires comment   Graduated/Revoked comments  goals met- no further calls needed.   Wrap up additional comments  pt was not interested in my conversation.          Jeri Mon RN

## 2019-10-28 ENCOUNTER — TELEPHONE (OUTPATIENT)
Dept: WOUND CARE | Facility: HOSPITAL | Age: 39
End: 2019-10-28

## 2019-10-28 NOTE — TELEPHONE ENCOUNTER
From VINAY Siegel email dated 10/27/2019 @ 1355  Hi, this is my stoma looking up from the ground five weeks after surgery.  There are gaps between it and the skin. Dr. Abebe looked at it last week and said that was ok but its pretty sore. Also, I ordered some Marcelo bags from [employer] and there are no closures on the end! Not sure how I missed that detail or how one would use those. If there are any one piece samples with closed ends you could send me, I’d appreciate it. Let me know if you think I need to come in. I’m off on 11/5.  Thanks  Sultana Siegel  [address]  [place] [id number]        My response today -  Sultana     You should be able to return the product back to [employer].  That can happen when ordering supplies as they all look the same in the pictures.  You want to make sure you order Lock N Roll Velcro closure.  The other one is a clamp closure.  There should have been a clamp in the product box.  Also you may need to try a different company for appliances, it looks like your skin is irritated, could be from the [employer].      Do you have appliances other than [employer]?      We can make an appointment for you on 11/5 if you would like, what time?  Our first appointment is at 830 and our last is at 3.   We have no one schedule yet for that day.    I am here this week Tuesday, Wednesday and Thursday.  Off Friday.    Have a blessed day.        Thank you,    Jackeline Thrasher MSN RN CWOCN  Wound Ostomy and Continence Nurse  [address] [facility] [address] [phone]  [email or web address]

## 2019-10-29 ENCOUNTER — TELEPHONE (OUTPATIENT)
Dept: WOUND CARE | Facility: HOSPITAL | Age: 39
End: 2019-10-29

## 2019-11-04 LAB
FUNGUS WND CULT: NORMAL
MYCOBACTERIUM SPEC CULT: NORMAL
NIGHT BLUE STAIN TISS: NORMAL

## 2019-11-05 ENCOUNTER — HOSPITAL ENCOUNTER (OUTPATIENT)
Dept: WOUND CARE | Facility: HOSPITAL | Age: 39
Discharge: HOME | End: 2019-11-05
Admitting: COLON & RECTAL SURGERY

## 2019-11-05 ENCOUNTER — TRANSCRIBE ORDERS (OUTPATIENT)
Dept: WOUND CARE | Facility: HOSPITAL | Age: 39
End: 2019-11-05

## 2019-11-05 DIAGNOSIS — Z71.89 OSTOMY NURSE CONSULTATION: Primary | ICD-10-CM

## 2019-11-05 PROCEDURE — G0463 HOSPITAL OUTPT CLINIC VISIT: HCPCS

## 2019-11-05 NOTE — NURSING NOTE
Outpatient Ostomy Therapy Note    Date of Surgery: 09/2019       Days Post Procedure:      Surgeon:  Mahin Abebe MD    Ostomy:  End Ileostomy- RUQ    Appliance Type:  Convatec    Appliance Size:  Size: 2-3/4    Stoma Description: Viable, Red and Moist    Stoma Size:  29mm    Peristomal Skin:  Intact and minimal MC separation    Last Appliance Change:  11/2/2019    Accessories:  Stoma Powder    Education:  Outpatient Followup and ME+ recovery, Gagan paste, use of a belt    Dressing Change:  N/A    Supplies Provided:  Yes    Education Folder Provided:  N/A    Plan of Care: F/U as needed.    Teaching provided to:  Patient    Comments:  Pt comes in with question regarding peristomal skin     Summary:  Pt is wearing a flat appliance, today she had a Convatec flat 2 piece appliance.  She said she is still using samples.  Used stoma powder around MC junction and Gagan paste on wafer, she does have a small dip at the 3 o'clock position.  [place] #[id number] used Placed a belt as well.  Patient stated she has soreness in her shoulders and neck, possibly due to guarding her ostomy.  Gave her the ME+ recovery booklet.  Patient to follow up when needed.       Marisel Thrasher RN - 11/05/19, 1:18 PM

## 2020-07-22 ENCOUNTER — HOSPITAL ENCOUNTER (INPATIENT)
Facility: HOSPITAL | Age: 40
LOS: 1 days | Discharge: HOME | End: 2020-07-24
Attending: EMERGENCY MEDICINE | Admitting: INTERNAL MEDICINE

## 2020-07-22 DIAGNOSIS — K56.609 SMALL BOWEL OBSTRUCTION (HCC): Primary | ICD-10-CM

## 2020-07-22 DIAGNOSIS — Z87.19 HISTORY OF CROHN'S DISEASE: ICD-10-CM

## 2020-07-22 DIAGNOSIS — Z98.890 H/O ILEOSTOMY: ICD-10-CM

## 2020-07-22 LAB
ALBUMIN SERPL-MCNC: 4.8 G/DL (ref 3.5–5.2)
ALBUMIN/GLOB SERPL: 1.1 G/DL
ALP SERPL-CCNC: 72 U/L (ref 39–117)
ALT SERPL W P-5'-P-CCNC: 28 U/L (ref 1–33)
ANION GAP SERPL CALCULATED.3IONS-SCNC: 12 MMOL/L (ref 5–15)
AST SERPL-CCNC: 25 U/L (ref 1–32)
BASOPHILS # BLD AUTO: 0.08 10*3/MM3 (ref 0–0.2)
BASOPHILS NFR BLD AUTO: 0.9 % (ref 0–1.5)
BILIRUB SERPL-MCNC: 0.3 MG/DL (ref 0–1.2)
BUN SERPL-MCNC: 16 MG/DL (ref 6–20)
BUN/CREAT SERPL: 16.5 (ref 7–25)
CALCIUM SPEC-SCNC: 9.8 MG/DL (ref 8.6–10.5)
CHLORIDE SERPL-SCNC: 105 MMOL/L (ref 98–107)
CO2 SERPL-SCNC: 16 MMOL/L (ref 22–29)
CREAT SERPL-MCNC: 0.97 MG/DL (ref 0.57–1)
DEPRECATED RDW RBC AUTO: 68.3 FL (ref 37–54)
EOSINOPHIL # BLD AUTO: 0.1 10*3/MM3 (ref 0–0.4)
EOSINOPHIL NFR BLD AUTO: 1.1 % (ref 0.3–6.2)
ERYTHROCYTE [DISTWIDTH] IN BLOOD BY AUTOMATED COUNT: 23.9 % (ref 12.3–15.4)
GFR SERPL CREATININE-BSD FRML MDRD: 63 ML/MIN/1.73
GLOBULIN UR ELPH-MCNC: 4.4 GM/DL
GLUCOSE SERPL-MCNC: 130 MG/DL (ref 65–99)
HCT VFR BLD AUTO: 46.7 % (ref 34–46.6)
HGB BLD-MCNC: 14.6 G/DL (ref 12–15.9)
HOLD SPECIMEN: NORMAL
HOLD SPECIMEN: NORMAL
IMM GRANULOCYTES # BLD AUTO: 0.02 10*3/MM3 (ref 0–0.05)
IMM GRANULOCYTES NFR BLD AUTO: 0.2 % (ref 0–0.5)
LIPASE SERPL-CCNC: 28 U/L (ref 13–60)
LYMPHOCYTES # BLD AUTO: 1.07 10*3/MM3 (ref 0.7–3.1)
LYMPHOCYTES NFR BLD AUTO: 11.7 % (ref 19.6–45.3)
MCH RBC QN AUTO: 26 PG (ref 26.6–33)
MCHC RBC AUTO-ENTMCNC: 31.3 G/DL (ref 31.5–35.7)
MCV RBC AUTO: 83.1 FL (ref 79–97)
MONOCYTES # BLD AUTO: 0.43 10*3/MM3 (ref 0.1–0.9)
MONOCYTES NFR BLD AUTO: 4.7 % (ref 5–12)
NEUTROPHILS NFR BLD AUTO: 7.45 10*3/MM3 (ref 1.7–7)
NEUTROPHILS NFR BLD AUTO: 81.4 % (ref 42.7–76)
NRBC BLD AUTO-RTO: 0 /100 WBC (ref 0–0.2)
PLAT MORPH BLD: NORMAL
PLATELET # BLD AUTO: 370 10*3/MM3 (ref 140–450)
PMV BLD AUTO: 9.1 FL (ref 6–12)
POTASSIUM SERPL-SCNC: 4.7 MMOL/L (ref 3.5–5.2)
PROT SERPL-MCNC: 9.2 G/DL (ref 6–8.5)
RBC # BLD AUTO: 5.62 10*6/MM3 (ref 3.77–5.28)
RBC MORPH BLD: NORMAL
SODIUM SERPL-SCNC: 133 MMOL/L (ref 136–145)
WBC # BLD AUTO: 9.15 10*3/MM3 (ref 3.4–10.8)
WBC MORPH BLD: NORMAL
WHOLE BLOOD HOLD SPECIMEN: NORMAL
WHOLE BLOOD HOLD SPECIMEN: NORMAL

## 2020-07-22 PROCEDURE — 25010000002 MORPHINE PER 10 MG: Performed by: EMERGENCY MEDICINE

## 2020-07-22 PROCEDURE — 85007 BL SMEAR W/DIFF WBC COUNT: CPT

## 2020-07-22 PROCEDURE — 99284 EMERGENCY DEPT VISIT MOD MDM: CPT

## 2020-07-22 PROCEDURE — 85025 COMPLETE CBC W/AUTO DIFF WBC: CPT

## 2020-07-22 PROCEDURE — 83690 ASSAY OF LIPASE: CPT

## 2020-07-22 PROCEDURE — 25010000002 ONDANSETRON PER 1 MG: Performed by: EMERGENCY MEDICINE

## 2020-07-22 PROCEDURE — 80053 COMPREHEN METABOLIC PANEL: CPT

## 2020-07-22 RX ORDER — ONDANSETRON 2 MG/ML
4 INJECTION INTRAMUSCULAR; INTRAVENOUS ONCE
Status: COMPLETED | OUTPATIENT
Start: 2020-07-22 | End: 2020-07-22

## 2020-07-22 RX ORDER — MORPHINE SULFATE 4 MG/ML
4 INJECTION, SOLUTION INTRAMUSCULAR; INTRAVENOUS ONCE
Status: COMPLETED | OUTPATIENT
Start: 2020-07-22 | End: 2020-07-22

## 2020-07-22 RX ORDER — SODIUM CHLORIDE 0.9 % (FLUSH) 0.9 %
10 SYRINGE (ML) INJECTION AS NEEDED
Status: DISCONTINUED | OUTPATIENT
Start: 2020-07-22 | End: 2020-07-24

## 2020-07-22 RX ADMIN — MORPHINE SULFATE 4 MG: 4 INJECTION, SOLUTION INTRAMUSCULAR; INTRAVENOUS at 22:31

## 2020-07-22 RX ADMIN — SODIUM CHLORIDE 1000 ML: 9 INJECTION, SOLUTION INTRAVENOUS at 22:31

## 2020-07-22 RX ADMIN — ONDANSETRON 4 MG: 2 INJECTION INTRAMUSCULAR; INTRAVENOUS at 22:31

## 2020-07-23 ENCOUNTER — APPOINTMENT (OUTPATIENT)
Dept: CT IMAGING | Facility: HOSPITAL | Age: 40
End: 2020-07-23

## 2020-07-23 PROBLEM — E61.1 IRON DEFICIENCY: Status: RESOLVED | Noted: 2018-01-20 | Resolved: 2020-07-23

## 2020-07-23 PROBLEM — K56.609 SBO (SMALL BOWEL OBSTRUCTION): Status: ACTIVE | Noted: 2020-07-23

## 2020-07-23 PROBLEM — K51.90 COLITIS, ULCERATIVE: Status: RESOLVED | Noted: 2019-09-23 | Resolved: 2020-07-23

## 2020-07-23 PROBLEM — D84.9 IMMUNOCOMPROMISED (HCC): Status: RESOLVED | Noted: 2017-09-14 | Resolved: 2020-07-23

## 2020-07-23 PROBLEM — K50.90 EXACERBATION OF CROHN'S DISEASE: Status: RESOLVED | Noted: 2019-09-18 | Resolved: 2020-07-23

## 2020-07-23 PROBLEM — R63.4 WEIGHT LOSS: Status: RESOLVED | Noted: 2019-09-18 | Resolved: 2020-07-23

## 2020-07-23 PROBLEM — Z79.899 MEDICATION MANAGEMENT: Status: RESOLVED | Noted: 2017-09-14 | Resolved: 2020-07-23

## 2020-07-23 PROBLEM — R19.7 DIARRHEA: Status: RESOLVED | Noted: 2019-09-18 | Resolved: 2020-07-23

## 2020-07-23 PROBLEM — K50.90 CROHN'S DISEASE (HCC): Status: ACTIVE | Noted: 2020-07-23

## 2020-07-23 LAB
ANION GAP SERPL CALCULATED.3IONS-SCNC: 10 MMOL/L (ref 5–15)
APTT PPP: 32.3 SECONDS (ref 24–37)
B-HCG UR QL: NEGATIVE
BACTERIA UR QL AUTO: ABNORMAL /HPF
BASOPHILS # BLD AUTO: 0.06 10*3/MM3 (ref 0–0.2)
BASOPHILS NFR BLD AUTO: 0.6 % (ref 0–1.5)
BILIRUB UR QL STRIP: NEGATIVE
BUN SERPL-MCNC: 13 MG/DL (ref 6–20)
BUN/CREAT SERPL: 15.9 (ref 7–25)
CALCIUM SPEC-SCNC: 8.3 MG/DL (ref 8.6–10.5)
CHLORIDE SERPL-SCNC: 108 MMOL/L (ref 98–107)
CLARITY UR: CLEAR
CO2 SERPL-SCNC: 17 MMOL/L (ref 22–29)
COLOR UR: YELLOW
CREAT SERPL-MCNC: 0.82 MG/DL (ref 0.57–1)
D-LACTATE SERPL-SCNC: 0.9 MMOL/L (ref 0.5–2)
DEPRECATED RDW RBC AUTO: 70.1 FL (ref 37–54)
EOSINOPHIL # BLD AUTO: 0.06 10*3/MM3 (ref 0–0.4)
EOSINOPHIL NFR BLD AUTO: 0.6 % (ref 0.3–6.2)
ERYTHROCYTE [DISTWIDTH] IN BLOOD BY AUTOMATED COUNT: 23.6 % (ref 12.3–15.4)
GFR SERPL CREATININE-BSD FRML MDRD: 77 ML/MIN/1.73
GLUCOSE SERPL-MCNC: 108 MG/DL (ref 65–99)
GLUCOSE UR STRIP-MCNC: NEGATIVE MG/DL
HBA1C MFR BLD: 4.9 % (ref 4.8–5.6)
HCT VFR BLD AUTO: 42.2 % (ref 34–46.6)
HGB BLD-MCNC: 12.7 G/DL (ref 12–15.9)
HGB UR QL STRIP.AUTO: NEGATIVE
HYALINE CASTS UR QL AUTO: ABNORMAL /LPF
IMM GRANULOCYTES # BLD AUTO: 0.03 10*3/MM3 (ref 0–0.05)
IMM GRANULOCYTES NFR BLD AUTO: 0.3 % (ref 0–0.5)
INR PPP: 1.01 (ref 0.85–1.16)
KETONES UR QL STRIP: NEGATIVE
LEUKOCYTE ESTERASE UR QL STRIP.AUTO: NEGATIVE
LYMPHOCYTES # BLD AUTO: 1.09 10*3/MM3 (ref 0.7–3.1)
LYMPHOCYTES NFR BLD AUTO: 11.7 % (ref 19.6–45.3)
MAGNESIUM SERPL-MCNC: 2.2 MG/DL (ref 1.6–2.6)
MCH RBC QN AUTO: 25.7 PG (ref 26.6–33)
MCHC RBC AUTO-ENTMCNC: 30.1 G/DL (ref 31.5–35.7)
MCV RBC AUTO: 85.3 FL (ref 79–97)
MONOCYTES # BLD AUTO: 0.65 10*3/MM3 (ref 0.1–0.9)
MONOCYTES NFR BLD AUTO: 7 % (ref 5–12)
MUCOUS THREADS URNS QL MICRO: ABNORMAL /HPF
NEUTROPHILS NFR BLD AUTO: 7.46 10*3/MM3 (ref 1.7–7)
NEUTROPHILS NFR BLD AUTO: 79.8 % (ref 42.7–76)
NITRITE UR QL STRIP: NEGATIVE
NRBC BLD AUTO-RTO: 0 /100 WBC (ref 0–0.2)
PH UR STRIP.AUTO: 5.5 [PH] (ref 5–8)
PLATELET # BLD AUTO: 324 10*3/MM3 (ref 140–450)
PMV BLD AUTO: 9.5 FL (ref 6–12)
POTASSIUM SERPL-SCNC: 4.6 MMOL/L (ref 3.5–5.2)
PROT UR QL STRIP: ABNORMAL
PROTHROMBIN TIME: 13 SECONDS (ref 11.5–14)
RBC # BLD AUTO: 4.95 10*6/MM3 (ref 3.77–5.28)
RBC # UR: ABNORMAL /HPF
REF LAB TEST METHOD: ABNORMAL
SODIUM SERPL-SCNC: 135 MMOL/L (ref 136–145)
SP GR UR STRIP: 1.03 (ref 1–1.03)
SQUAMOUS #/AREA URNS HPF: ABNORMAL /HPF
UROBILINOGEN UR QL STRIP: ABNORMAL
WBC # BLD AUTO: 9.35 10*3/MM3 (ref 3.4–10.8)
WBC UR QL AUTO: ABNORMAL /HPF

## 2020-07-23 PROCEDURE — 74177 CT ABD & PELVIS W/CONTRAST: CPT

## 2020-07-23 PROCEDURE — 99223 1ST HOSP IP/OBS HIGH 75: CPT | Performed by: INTERNAL MEDICINE

## 2020-07-23 PROCEDURE — 25010000002 ONDANSETRON PER 1 MG: Performed by: NURSE PRACTITIONER

## 2020-07-23 PROCEDURE — 25010000002 HYDROMORPHONE PER 4 MG: Performed by: FAMILY MEDICINE

## 2020-07-23 PROCEDURE — 81025 URINE PREGNANCY TEST: CPT | Performed by: EMERGENCY MEDICINE

## 2020-07-23 PROCEDURE — 25010000002 ONDANSETRON PER 1 MG: Performed by: EMERGENCY MEDICINE

## 2020-07-23 PROCEDURE — 80048 BASIC METABOLIC PNL TOTAL CA: CPT | Performed by: NURSE PRACTITIONER

## 2020-07-23 PROCEDURE — 25010000002 IOPAMIDOL 61 % SOLUTION: Performed by: EMERGENCY MEDICINE

## 2020-07-23 PROCEDURE — 83605 ASSAY OF LACTIC ACID: CPT | Performed by: NURSE PRACTITIONER

## 2020-07-23 PROCEDURE — 85730 THROMBOPLASTIN TIME PARTIAL: CPT | Performed by: NURSE PRACTITIONER

## 2020-07-23 PROCEDURE — 83735 ASSAY OF MAGNESIUM: CPT | Performed by: NURSE PRACTITIONER

## 2020-07-23 PROCEDURE — 83036 HEMOGLOBIN GLYCOSYLATED A1C: CPT | Performed by: NURSE PRACTITIONER

## 2020-07-23 PROCEDURE — 85025 COMPLETE CBC W/AUTO DIFF WBC: CPT | Performed by: NURSE PRACTITIONER

## 2020-07-23 PROCEDURE — 94799 UNLISTED PULMONARY SVC/PX: CPT

## 2020-07-23 PROCEDURE — 93005 ELECTROCARDIOGRAM TRACING: CPT | Performed by: NURSE PRACTITIONER

## 2020-07-23 PROCEDURE — 85610 PROTHROMBIN TIME: CPT | Performed by: NURSE PRACTITIONER

## 2020-07-23 PROCEDURE — 81001 URINALYSIS AUTO W/SCOPE: CPT | Performed by: EMERGENCY MEDICINE

## 2020-07-23 PROCEDURE — 25010000002 MORPHINE PER 10 MG: Performed by: EMERGENCY MEDICINE

## 2020-07-23 RX ORDER — SODIUM CHLORIDE 0.9 % (FLUSH) 0.9 %
10 SYRINGE (ML) INJECTION AS NEEDED
Status: DISCONTINUED | OUTPATIENT
Start: 2020-07-23 | End: 2020-07-24

## 2020-07-23 RX ORDER — SODIUM CHLORIDE 0.9 % (FLUSH) 0.9 %
10 SYRINGE (ML) INJECTION EVERY 12 HOURS SCHEDULED
Status: DISCONTINUED | OUTPATIENT
Start: 2020-07-23 | End: 2020-07-24 | Stop reason: HOSPADM

## 2020-07-23 RX ORDER — PANTOPRAZOLE SODIUM 40 MG/10ML
40 INJECTION, POWDER, LYOPHILIZED, FOR SOLUTION INTRAVENOUS
Status: DISCONTINUED | OUTPATIENT
Start: 2020-07-23 | End: 2020-07-24 | Stop reason: HOSPADM

## 2020-07-23 RX ORDER — ONDANSETRON 4 MG/1
4 TABLET, FILM COATED ORAL EVERY 6 HOURS PRN
Status: DISCONTINUED | OUTPATIENT
Start: 2020-07-23 | End: 2020-07-24 | Stop reason: HOSPADM

## 2020-07-23 RX ORDER — IBUPROFEN 200 MG
200 TABLET ORAL EVERY 6 HOURS PRN
COMMUNITY

## 2020-07-23 RX ORDER — ONDANSETRON 2 MG/ML
4 INJECTION INTRAMUSCULAR; INTRAVENOUS EVERY 6 HOURS PRN
Status: DISCONTINUED | OUTPATIENT
Start: 2020-07-23 | End: 2020-07-24

## 2020-07-23 RX ORDER — INFLIXIMAB 100 MG/10ML
3 INJECTION, POWDER, LYOPHILIZED, FOR SOLUTION INTRAVENOUS ONCE
COMMUNITY

## 2020-07-23 RX ORDER — ONDANSETRON 2 MG/ML
4 INJECTION INTRAMUSCULAR; INTRAVENOUS ONCE
Status: COMPLETED | OUTPATIENT
Start: 2020-07-23 | End: 2020-07-23

## 2020-07-23 RX ORDER — HYDROMORPHONE HYDROCHLORIDE 1 MG/ML
0.5 INJECTION, SOLUTION INTRAMUSCULAR; INTRAVENOUS; SUBCUTANEOUS
Status: DISCONTINUED | OUTPATIENT
Start: 2020-07-23 | End: 2020-07-24

## 2020-07-23 RX ORDER — MORPHINE SULFATE 4 MG/ML
4 INJECTION, SOLUTION INTRAMUSCULAR; INTRAVENOUS ONCE
Status: COMPLETED | OUTPATIENT
Start: 2020-07-23 | End: 2020-07-23

## 2020-07-23 RX ORDER — SODIUM CHLORIDE 9 MG/ML
125 INJECTION, SOLUTION INTRAVENOUS CONTINUOUS
Status: DISCONTINUED | OUTPATIENT
Start: 2020-07-23 | End: 2020-07-24

## 2020-07-23 RX ADMIN — SODIUM CHLORIDE, PRESERVATIVE FREE 10 ML: 5 INJECTION INTRAVENOUS at 20:13

## 2020-07-23 RX ADMIN — MORPHINE SULFATE 4 MG: 4 INJECTION, SOLUTION INTRAMUSCULAR; INTRAVENOUS at 00:40

## 2020-07-23 RX ADMIN — ONDANSETRON 4 MG: 2 INJECTION INTRAMUSCULAR; INTRAVENOUS at 08:15

## 2020-07-23 RX ADMIN — HYDROMORPHONE HYDROCHLORIDE 0.5 MG: 1 INJECTION, SOLUTION INTRAMUSCULAR; INTRAVENOUS; SUBCUTANEOUS at 08:15

## 2020-07-23 RX ADMIN — HYDROMORPHONE HYDROCHLORIDE 0.5 MG: 1 INJECTION, SOLUTION INTRAMUSCULAR; INTRAVENOUS; SUBCUTANEOUS at 10:34

## 2020-07-23 RX ADMIN — HYDROMORPHONE HYDROCHLORIDE 0.5 MG: 1 INJECTION, SOLUTION INTRAMUSCULAR; INTRAVENOUS; SUBCUTANEOUS at 17:18

## 2020-07-23 RX ADMIN — MORPHINE SULFATE 4 MG: 4 INJECTION, SOLUTION INTRAMUSCULAR; INTRAVENOUS at 01:51

## 2020-07-23 RX ADMIN — SODIUM CHLORIDE 125 ML/HR: 9 INJECTION, SOLUTION INTRAVENOUS at 04:01

## 2020-07-23 RX ADMIN — HYDROMORPHONE HYDROCHLORIDE 0.5 MG: 1 INJECTION, SOLUTION INTRAMUSCULAR; INTRAVENOUS; SUBCUTANEOUS at 22:05

## 2020-07-23 RX ADMIN — HYDROMORPHONE HYDROCHLORIDE 0.5 MG: 1 INJECTION, SOLUTION INTRAMUSCULAR; INTRAVENOUS; SUBCUTANEOUS at 15:14

## 2020-07-23 RX ADMIN — PANTOPRAZOLE SODIUM 40 MG: 40 INJECTION, POWDER, FOR SOLUTION INTRAVENOUS at 05:55

## 2020-07-23 RX ADMIN — HYDROMORPHONE HYDROCHLORIDE 0.5 MG: 1 INJECTION, SOLUTION INTRAMUSCULAR; INTRAVENOUS; SUBCUTANEOUS at 05:55

## 2020-07-23 RX ADMIN — HYDROMORPHONE HYDROCHLORIDE 0.5 MG: 1 INJECTION, SOLUTION INTRAMUSCULAR; INTRAVENOUS; SUBCUTANEOUS at 12:52

## 2020-07-23 RX ADMIN — SODIUM CHLORIDE 1000 ML: 9 INJECTION, SOLUTION INTRAVENOUS at 01:38

## 2020-07-23 RX ADMIN — SODIUM CHLORIDE, PRESERVATIVE FREE 10 ML: 5 INJECTION INTRAVENOUS at 08:15

## 2020-07-23 RX ADMIN — SODIUM CHLORIDE 125 ML/HR: 9 INJECTION, SOLUTION INTRAVENOUS at 19:32

## 2020-07-23 RX ADMIN — HYDROMORPHONE HYDROCHLORIDE 0.5 MG: 1 INJECTION, SOLUTION INTRAMUSCULAR; INTRAVENOUS; SUBCUTANEOUS at 19:32

## 2020-07-23 RX ADMIN — ONDANSETRON 4 MG: 2 INJECTION INTRAMUSCULAR; INTRAVENOUS at 00:40

## 2020-07-23 RX ADMIN — IOPAMIDOL 100 ML: 612 INJECTION, SOLUTION INTRAVENOUS at 01:06

## 2020-07-24 ENCOUNTER — APPOINTMENT (OUTPATIENT)
Dept: GENERAL RADIOLOGY | Facility: HOSPITAL | Age: 40
End: 2020-07-24

## 2020-07-24 VITALS
TEMPERATURE: 97.9 F | RESPIRATION RATE: 18 BRPM | DIASTOLIC BLOOD PRESSURE: 79 MMHG | OXYGEN SATURATION: 98 % | HEART RATE: 70 BPM | SYSTOLIC BLOOD PRESSURE: 122 MMHG | WEIGHT: 161 LBS | HEIGHT: 68 IN | BODY MASS INDEX: 24.4 KG/M2

## 2020-07-24 PROCEDURE — 25010000002 ONDANSETRON PER 1 MG: Performed by: NURSE PRACTITIONER

## 2020-07-24 PROCEDURE — 0 DIATRIZOATE MEGLUMINE & SODIUM PER 1 ML: Performed by: INTERNAL MEDICINE

## 2020-07-24 PROCEDURE — 25010000002 HYDROMORPHONE PER 4 MG: Performed by: FAMILY MEDICINE

## 2020-07-24 PROCEDURE — 74270 X-RAY XM COLON 1CNTRST STD: CPT

## 2020-07-24 PROCEDURE — 99239 HOSP IP/OBS DSCHRG MGMT >30: CPT | Performed by: INTERNAL MEDICINE

## 2020-07-24 RX ORDER — ONDANSETRON 4 MG/1
4 TABLET, FILM COATED ORAL EVERY 6 HOURS PRN
Qty: 12 TABLET | Refills: 0 | Status: SHIPPED | OUTPATIENT
Start: 2020-07-24

## 2020-07-24 RX ADMIN — HYDROMORPHONE HYDROCHLORIDE 0.5 MG: 1 INJECTION, SOLUTION INTRAMUSCULAR; INTRAVENOUS; SUBCUTANEOUS at 13:22

## 2020-07-24 RX ADMIN — SODIUM CHLORIDE 125 ML/HR: 9 INJECTION, SOLUTION INTRAVENOUS at 00:13

## 2020-07-24 RX ADMIN — PANTOPRAZOLE SODIUM 40 MG: 40 INJECTION, POWDER, FOR SOLUTION INTRAVENOUS at 05:18

## 2020-07-24 RX ADMIN — HYDROMORPHONE HYDROCHLORIDE 0.5 MG: 1 INJECTION, SOLUTION INTRAMUSCULAR; INTRAVENOUS; SUBCUTANEOUS at 16:21

## 2020-07-24 RX ADMIN — SODIUM CHLORIDE, PRESERVATIVE FREE 10 ML: 5 INJECTION INTRAVENOUS at 08:45

## 2020-07-24 RX ADMIN — HYDROMORPHONE HYDROCHLORIDE 0.5 MG: 1 INJECTION, SOLUTION INTRAMUSCULAR; INTRAVENOUS; SUBCUTANEOUS at 08:53

## 2020-07-24 RX ADMIN — ONDANSETRON 4 MG: 2 INJECTION INTRAMUSCULAR; INTRAVENOUS at 08:53

## 2020-07-24 RX ADMIN — ONDANSETRON 4 MG: 2 INJECTION INTRAMUSCULAR; INTRAVENOUS at 03:26

## 2020-07-24 RX ADMIN — DIATRIZOATE MEGLUMINE AND DIATRIZOATE SODIUM 480 ML: 660; 100 LIQUID ORAL; RECTAL at 12:59

## 2020-07-24 RX ADMIN — HYDROMORPHONE HYDROCHLORIDE 0.5 MG: 1 INJECTION, SOLUTION INTRAMUSCULAR; INTRAVENOUS; SUBCUTANEOUS at 00:13

## 2020-07-24 RX ADMIN — HYDROMORPHONE HYDROCHLORIDE 0.5 MG: 1 INJECTION, SOLUTION INTRAMUSCULAR; INTRAVENOUS; SUBCUTANEOUS at 02:58

## 2020-07-24 RX ADMIN — HYDROMORPHONE HYDROCHLORIDE 0.5 MG: 1 INJECTION, SOLUTION INTRAMUSCULAR; INTRAVENOUS; SUBCUTANEOUS at 10:46

## 2020-07-24 RX ADMIN — SODIUM CHLORIDE 125 ML/HR: 9 INJECTION, SOLUTION INTRAVENOUS at 08:45

## 2020-07-24 RX ADMIN — HYDROMORPHONE HYDROCHLORIDE 0.5 MG: 1 INJECTION, SOLUTION INTRAMUSCULAR; INTRAVENOUS; SUBCUTANEOUS at 05:17

## 2020-07-25 ENCOUNTER — HOSPITAL ENCOUNTER (EMERGENCY)
Facility: HOSPITAL | Age: 40
Discharge: LEFT BEFORE DISCHARGE | End: 2020-07-25

## 2020-07-25 VITALS
HEART RATE: 65 BPM | WEIGHT: 155 LBS | DIASTOLIC BLOOD PRESSURE: 81 MMHG | BODY MASS INDEX: 23.49 KG/M2 | SYSTOLIC BLOOD PRESSURE: 115 MMHG | RESPIRATION RATE: 18 BRPM | OXYGEN SATURATION: 95 % | TEMPERATURE: 98.5 F | HEIGHT: 68 IN

## 2022-06-16 ENCOUNTER — TRANSCRIBE ORDERS (OUTPATIENT)
Dept: ADMINISTRATIVE | Facility: HOSPITAL | Age: 42
End: 2022-06-16

## 2022-06-16 DIAGNOSIS — Z12.31 SCREENING MAMMOGRAM FOR BREAST CANCER: Primary | ICD-10-CM

## 2022-07-28 ENCOUNTER — HOSPITAL ENCOUNTER (OUTPATIENT)
Dept: MAMMOGRAPHY | Facility: HOSPITAL | Age: 42
Discharge: HOME | End: 2022-07-28
Admitting: FAMILY MEDICINE

## 2022-07-28 DIAGNOSIS — Z12.31 SCREENING MAMMOGRAM FOR BREAST CANCER: ICD-10-CM

## 2022-07-28 PROCEDURE — 77063 BREAST TOMOSYNTHESIS BI: CPT | Performed by: RADIOLOGY

## 2022-07-28 PROCEDURE — 77067 SCR MAMMO BI INCL CAD: CPT | Performed by: RADIOLOGY

## 2022-07-28 PROCEDURE — 77067 SCR MAMMO BI INCL CAD: CPT

## 2022-07-28 PROCEDURE — 77063 BREAST TOMOSYNTHESIS BI: CPT

## 2023-03-15 ENCOUNTER — TELEPHONE (OUTPATIENT)
Dept: WOUND CARE | Facility: HOSPITAL | Age: 43
End: 2023-03-15
Payer: COMMERCIAL
